# Patient Record
Sex: MALE | Race: WHITE | NOT HISPANIC OR LATINO | URBAN - METROPOLITAN AREA
[De-identification: names, ages, dates, MRNs, and addresses within clinical notes are randomized per-mention and may not be internally consistent; named-entity substitution may affect disease eponyms.]

---

## 2020-03-10 ENCOUNTER — EMERGENCY (EMERGENCY)
Facility: HOSPITAL | Age: 85
LOS: 0 days | Discharge: HOME | End: 2020-03-10
Attending: EMERGENCY MEDICINE
Payer: MEDICARE

## 2020-03-10 VITALS
TEMPERATURE: 98 F | OXYGEN SATURATION: 95 % | HEART RATE: 75 BPM | DIASTOLIC BLOOD PRESSURE: 62 MMHG | SYSTOLIC BLOOD PRESSURE: 125 MMHG | RESPIRATION RATE: 18 BRPM

## 2020-03-10 VITALS
OXYGEN SATURATION: 100 % | SYSTOLIC BLOOD PRESSURE: 99 MMHG | DIASTOLIC BLOOD PRESSURE: 67 MMHG | WEIGHT: 210.98 LBS | HEART RATE: 88 BPM | HEIGHT: 71 IN | TEMPERATURE: 99 F | RESPIRATION RATE: 12 BRPM

## 2020-03-10 DIAGNOSIS — E03.9 HYPOTHYROIDISM, UNSPECIFIED: ICD-10-CM

## 2020-03-10 DIAGNOSIS — R19.7 DIARRHEA, UNSPECIFIED: ICD-10-CM

## 2020-03-10 DIAGNOSIS — Z87.891 PERSONAL HISTORY OF NICOTINE DEPENDENCE: ICD-10-CM

## 2020-03-10 DIAGNOSIS — R10.9 UNSPECIFIED ABDOMINAL PAIN: ICD-10-CM

## 2020-03-10 LAB
ALBUMIN SERPL ELPH-MCNC: 3.2 G/DL — LOW (ref 3.3–5)
ALP SERPL-CCNC: 55 U/L — SIGNIFICANT CHANGE UP (ref 40–120)
ALT FLD-CCNC: 17 U/L — SIGNIFICANT CHANGE UP (ref 12–78)
ANION GAP SERPL CALC-SCNC: 9 MMOL/L — SIGNIFICANT CHANGE UP (ref 5–17)
AST SERPL-CCNC: 22 U/L — SIGNIFICANT CHANGE UP (ref 15–37)
BASOPHILS # BLD AUTO: 0 K/UL — SIGNIFICANT CHANGE UP (ref 0–0.2)
BASOPHILS NFR BLD AUTO: 0 % — SIGNIFICANT CHANGE UP (ref 0–2)
BILIRUB SERPL-MCNC: 0.4 MG/DL — SIGNIFICANT CHANGE UP (ref 0.2–1.2)
BUN SERPL-MCNC: 38 MG/DL — HIGH (ref 7–23)
CALCIUM SERPL-MCNC: 8.6 MG/DL — SIGNIFICANT CHANGE UP (ref 8.5–10.1)
CHLORIDE SERPL-SCNC: 106 MMOL/L — SIGNIFICANT CHANGE UP (ref 96–108)
CO2 SERPL-SCNC: 22 MMOL/L — SIGNIFICANT CHANGE UP (ref 22–31)
CREAT SERPL-MCNC: 2.49 MG/DL — HIGH (ref 0.5–1.3)
EOSINOPHIL # BLD AUTO: 0.24 K/UL — SIGNIFICANT CHANGE UP (ref 0–0.5)
EOSINOPHIL NFR BLD AUTO: 2 % — SIGNIFICANT CHANGE UP (ref 0–6)
GLUCOSE SERPL-MCNC: 134 MG/DL — HIGH (ref 70–99)
HCT VFR BLD CALC: 39.2 % — SIGNIFICANT CHANGE UP (ref 39–50)
HGB BLD-MCNC: 12.9 G/DL — LOW (ref 13–17)
LIDOCAIN IGE QN: 101 U/L — SIGNIFICANT CHANGE UP (ref 73–393)
LYMPHOCYTES # BLD AUTO: 0.36 K/UL — LOW (ref 1–3.3)
LYMPHOCYTES # BLD AUTO: 3 % — LOW (ref 13–44)
MCHC RBC-ENTMCNC: 30.9 PG — SIGNIFICANT CHANGE UP (ref 27–34)
MCHC RBC-ENTMCNC: 32.9 GM/DL — SIGNIFICANT CHANGE UP (ref 32–36)
MCV RBC AUTO: 94 FL — SIGNIFICANT CHANGE UP (ref 80–100)
MONOCYTES # BLD AUTO: 0.36 K/UL — SIGNIFICANT CHANGE UP (ref 0–0.9)
MONOCYTES NFR BLD AUTO: 3 % — SIGNIFICANT CHANGE UP (ref 2–14)
NEUTROPHILS # BLD AUTO: 11.18 K/UL — HIGH (ref 1.8–7.4)
NEUTROPHILS NFR BLD AUTO: 92 % — HIGH (ref 43–77)
NRBC # BLD: SIGNIFICANT CHANGE UP /100 WBCS (ref 0–0)
PLATELET # BLD AUTO: 156 K/UL — SIGNIFICANT CHANGE UP (ref 150–400)
POTASSIUM SERPL-MCNC: 4.5 MMOL/L — SIGNIFICANT CHANGE UP (ref 3.5–5.3)
POTASSIUM SERPL-SCNC: 4.5 MMOL/L — SIGNIFICANT CHANGE UP (ref 3.5–5.3)
PROT SERPL-MCNC: 8 GM/DL — SIGNIFICANT CHANGE UP (ref 6–8.3)
RBC # BLD: 4.17 M/UL — LOW (ref 4.2–5.8)
RBC # FLD: 14.6 % — HIGH (ref 10.3–14.5)
SODIUM SERPL-SCNC: 137 MMOL/L — SIGNIFICANT CHANGE UP (ref 135–145)
WBC # BLD: 12.15 K/UL — HIGH (ref 3.8–10.5)
WBC # FLD AUTO: 12.15 K/UL — HIGH (ref 3.8–10.5)

## 2020-03-10 PROCEDURE — 74176 CT ABD & PELVIS W/O CONTRAST: CPT

## 2020-03-10 PROCEDURE — 85025 COMPLETE CBC W/AUTO DIFF WBC: CPT

## 2020-03-10 PROCEDURE — 74176 CT ABD & PELVIS W/O CONTRAST: CPT | Mod: 26

## 2020-03-10 PROCEDURE — 99284 EMERGENCY DEPT VISIT MOD MDM: CPT | Mod: 25

## 2020-03-10 PROCEDURE — 80053 COMPREHEN METABOLIC PANEL: CPT

## 2020-03-10 PROCEDURE — 83690 ASSAY OF LIPASE: CPT

## 2020-03-10 PROCEDURE — 99284 EMERGENCY DEPT VISIT MOD MDM: CPT

## 2020-03-10 PROCEDURE — 36415 COLL VENOUS BLD VENIPUNCTURE: CPT

## 2020-03-10 RX ORDER — SODIUM CHLORIDE 9 MG/ML
1000 INJECTION INTRAMUSCULAR; INTRAVENOUS; SUBCUTANEOUS ONCE
Refills: 0 | Status: COMPLETED | OUTPATIENT
Start: 2020-03-10 | End: 2020-03-10

## 2020-03-10 RX ADMIN — SODIUM CHLORIDE 1000 MILLILITER(S): 9 INJECTION INTRAMUSCULAR; INTRAVENOUS; SUBCUTANEOUS at 12:12

## 2020-03-10 NOTE — ED ADULT NURSE NOTE - OBJECTIVE STATEMENT
Patient brought in by EMS for nausea, vomiting and diarrhea since last night. Patient and wife moved to facility a few days ago and developed symptoms so facility sent to ER. wife has the same symptoms which started last night. Patient also complaining of being thirsty. patient alert and oriented but forgetful.

## 2020-03-10 NOTE — ED ADULT NURSE NOTE - NSIMPLEMENTINTERV_GEN_ALL_ED
Implemented All Fall with Harm Risk Interventions:  [place] to call system. Call bell, personal items and telephone within reach. Instruct patient to call for assistance. Room bathroom lighting operational. Non-slip footwear when patient is off stretcher. Physically safe environment: no spills, clutter or unnecessary equipment. Stretcher in lowest position, wheels locked, appropriate side rails in place. Provide visual cue, wrist band, yellow gown, etc. Monitor gait and stability. Monitor for mental status changes and reorient to person, place, and time. Review medications for side effects contributing to fall risk. Reinforce activity limits and safety measures with patient and family. Provide visual clues: red socks.

## 2020-03-10 NOTE — ED PROVIDER NOTE - CLINICAL SUMMARY MEDICAL DECISION MAKING FREE TEXT BOX
87M 87M presents to the ED for diarrhea, abdominal discomfort starting yesterday. Multiple episodes, sent from facility to r/o norovirus. Exam with RLQ TTP. Likely viral however concerned for potential diverticulitis. Will obtain labs, stool studies, imaging, reassess.

## 2020-03-10 NOTE — ED PROVIDER NOTE - OBJECTIVE STATEMENT
88 y/o male with PMHx of hypothyroid presents to the ED sent by Rupa CARRILLO regarding diarrhea and abdominal pain since yesterday. Reports he had 10 episodes of watery diarrhea, now improved. +abdominal discomfort. Denies chest pain, SOB, vomiting, fever, chills. Rupa sent pt to the ED to r/o norovirus. No hospitalizations or abx use in the past month. Former smoker, no EtOH use. NKDA. On Synthroid.

## 2020-03-10 NOTE — ED PROVIDER NOTE - NS_ ATTENDINGSCRIBEDETAILS _ED_A_ED_FT
I, Gera Ross MD,  performed the initial face to face bedside interview with this patient regarding history of present illness, review of symptoms and relevant past medical, social and family history.  I completed an independent physical examination.  I was the initial provider who evaluated this patient.  The history, relevant review of systems, past medical and surgical history, medical decision making, and physical examination was documented by the scribe in my presence and I attest to the accuracy of the documentation.

## 2020-03-10 NOTE — ED PROVIDER NOTE - PHYSICAL EXAMINATION
Constitutional: mild distress AAOx3  Eyes: PERRLA EOMI  Head: Normocephalic atraumatic  Mouth: MMM  Cardiac: regular rate   Resp: Lungs CTAB  GI: Abd s/nd +RLQ TTP  Neuro: CN2-12 intact  Skin: No rashes Constitutional: mild distress AAOx3  Eyes: PERRLA EOMI  Head: Normocephalic atraumatic  Mouth: MMM  Cardiac: regular rate   Resp: Lungs CTAB  GI: Abd s/nd +RLQ TTP no rebound or guarding  Neuro: CN2-12 intact  Skin: No rashes Constitutional: NAD AAOx3  Eyes: PERRLA EOMI  Head: Normocephalic atraumatic  Mouth: MMM  Cardiac: regular rate   Resp: Lungs CTAB  GI: Abd s/nd +RLQ TTP no rebound or guarding  Neuro: CN2-12 intact  Skin: No rashes

## 2020-03-10 NOTE — ED ADULT NURSE NOTE - CHPI ED NUR SYMPTOMS NEG
no burning urination/no fever/no hematuria/no abdominal distension/no blood in stool/no chills/no dysuria

## 2020-03-10 NOTE — ED PROVIDER NOTE - NS ED ROS FT
Constitutional: No fever or chills  Eyes: No visual changes  HEENT: No throat pain  CV: No chest pain  Resp: No SOB no cough  GI: No nausea or vomiting +diarrhea +abd pain   : No dysuria  MSK: No musculoskeletal pain  Skin: No rash  Neuro: No headache

## 2020-03-10 NOTE — ED PROVIDER NOTE - PATIENT PORTAL LINK FT
You can access the FollowMyHealth Patient Portal offered by [facility] by registering at the following website: [email or web address]. By joining [employer]’s FollowMyHealth portal, you will also be able to view your health information using other applications (apps) compatible with our system.

## 2020-03-10 NOTE — ED ADULT TRIAGE NOTE - CHIEF COMPLAINT QUOTE
VOMITING AND DIARRHEA FROM THE [facility] HISTORY OF DEMENTIA VOMITING AND DIARRHEA FROM THE [facility]

## 2020-03-10 NOTE — ED PROVIDER NOTE - PROGRESS NOTE DETAILS
ct unremarkable vss. spoke with [facility] and family regarding labs and need for follow up. spoke with [facility] regarding isolation for norovirus/cdiff until test results return. aware. pt well appearing tolerating PO abd soft non-tender. vss. will d/c with follo wup and strict return precautions. Gera Ross M.D., Attending Physician

## 2020-06-22 ENCOUNTER — EMERGENCY (EMERGENCY)
Facility: HOSPITAL | Age: 85
LOS: 0 days | Discharge: HOME | End: 2020-06-23
Attending: EMERGENCY MEDICINE
Payer: MEDICARE

## 2020-06-22 VITALS
HEART RATE: 81 BPM | HEIGHT: 74 IN | DIASTOLIC BLOOD PRESSURE: 75 MMHG | WEIGHT: 220.02 LBS | TEMPERATURE: 98 F | RESPIRATION RATE: 17 BRPM | OXYGEN SATURATION: 99 % | SYSTOLIC BLOOD PRESSURE: 139 MMHG

## 2020-06-22 DIAGNOSIS — Z11.59 ENCOUNTER FOR SCREENING FOR OTHER VIRAL DISEASES: ICD-10-CM

## 2020-06-22 DIAGNOSIS — Z88.0 ALLERGY STATUS TO PENICILLIN: ICD-10-CM

## 2020-06-22 DIAGNOSIS — R41.82 ALTERED MENTAL STATUS, UNSPECIFIED: ICD-10-CM

## 2020-06-22 DIAGNOSIS — M54.5 LOW BACK PAIN: ICD-10-CM

## 2020-06-22 DIAGNOSIS — E03.9 HYPOTHYROIDISM, UNSPECIFIED: ICD-10-CM
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
## 2020-06-22 LAB
ALBUMIN SERPL ELPH-MCNC: 3.3 G/DL — SIGNIFICANT CHANGE UP (ref 3.3–5)
ALP SERPL-CCNC: 108 U/L — SIGNIFICANT CHANGE UP (ref 40–120)
ALT FLD-CCNC: 18 U/L — SIGNIFICANT CHANGE UP (ref 12–78)
ANION GAP SERPL CALC-SCNC: 9 MMOL/L — SIGNIFICANT CHANGE UP (ref 5–17)
APPEARANCE UR: CLEAR — SIGNIFICANT CHANGE UP
AST SERPL-CCNC: 16 U/L — SIGNIFICANT CHANGE UP (ref 15–37)
BASOPHILS # BLD AUTO: 0.02 K/UL — SIGNIFICANT CHANGE UP (ref 0–0.2)
BASOPHILS NFR BLD AUTO: 0.2 % — SIGNIFICANT CHANGE UP (ref 0–2)
BILIRUB SERPL-MCNC: 0.3 MG/DL — SIGNIFICANT CHANGE UP (ref 0.2–1.2)
BILIRUB UR-MCNC: NEGATIVE — SIGNIFICANT CHANGE UP
BUN SERPL-MCNC: 19 MG/DL — SIGNIFICANT CHANGE UP (ref 7–23)
CALCIUM SERPL-MCNC: 8.7 MG/DL — SIGNIFICANT CHANGE UP (ref 8.5–10.1)
CHLORIDE SERPL-SCNC: 106 MMOL/L — SIGNIFICANT CHANGE UP (ref 96–108)
CO2 SERPL-SCNC: 24 MMOL/L — SIGNIFICANT CHANGE UP (ref 22–31)
COLOR SPEC: YELLOW — SIGNIFICANT CHANGE UP
CREAT SERPL-MCNC: 1.46 MG/DL — HIGH (ref 0.5–1.3)
DIFF PNL FLD: NEGATIVE — SIGNIFICANT CHANGE UP
EOSINOPHIL # BLD AUTO: 0.18 K/UL — SIGNIFICANT CHANGE UP (ref 0–0.5)
EOSINOPHIL NFR BLD AUTO: 1.9 % — SIGNIFICANT CHANGE UP (ref 0–6)
GLUCOSE SERPL-MCNC: 91 MG/DL — SIGNIFICANT CHANGE UP (ref 70–99)
GLUCOSE UR QL: NEGATIVE MG/DL — SIGNIFICANT CHANGE UP
HCT VFR BLD CALC: 33.9 % — LOW (ref 39–50)
HGB BLD-MCNC: 11.3 G/DL — LOW (ref 13–17)
IMM GRANULOCYTES NFR BLD AUTO: 0.4 % — SIGNIFICANT CHANGE UP (ref 0–1.5)
KETONES UR-MCNC: NEGATIVE — SIGNIFICANT CHANGE UP
LEUKOCYTE ESTERASE UR-ACNC: NEGATIVE — SIGNIFICANT CHANGE UP
LYMPHOCYTES # BLD AUTO: 1.89 K/UL — SIGNIFICANT CHANGE UP (ref 1–3.3)
LYMPHOCYTES # BLD AUTO: 20.3 % — SIGNIFICANT CHANGE UP (ref 13–44)
MCHC RBC-ENTMCNC: 30.5 PG — SIGNIFICANT CHANGE UP (ref 27–34)
MCHC RBC-ENTMCNC: 33.3 GM/DL — SIGNIFICANT CHANGE UP (ref 32–36)
MCV RBC AUTO: 91.4 FL — SIGNIFICANT CHANGE UP (ref 80–100)
MONOCYTES # BLD AUTO: 1.06 K/UL — HIGH (ref 0–0.9)
MONOCYTES NFR BLD AUTO: 11.4 % — SIGNIFICANT CHANGE UP (ref 2–14)
NEUTROPHILS # BLD AUTO: 6.14 K/UL — SIGNIFICANT CHANGE UP (ref 1.8–7.4)
NEUTROPHILS NFR BLD AUTO: 65.8 % — SIGNIFICANT CHANGE UP (ref 43–77)
NITRITE UR-MCNC: NEGATIVE — SIGNIFICANT CHANGE UP
PH UR: 5 — SIGNIFICANT CHANGE UP (ref 5–8)
PLATELET # BLD AUTO: 193 K/UL — SIGNIFICANT CHANGE UP (ref 150–400)
POTASSIUM SERPL-MCNC: 3.7 MMOL/L — SIGNIFICANT CHANGE UP (ref 3.5–5.3)
POTASSIUM SERPL-SCNC: 3.7 MMOL/L — SIGNIFICANT CHANGE UP (ref 3.5–5.3)
PROT SERPL-MCNC: 7.6 GM/DL — SIGNIFICANT CHANGE UP (ref 6–8.3)
PROT UR-MCNC: NEGATIVE MG/DL — SIGNIFICANT CHANGE UP
RBC # BLD: 3.71 M/UL — LOW (ref 4.2–5.8)
RBC # FLD: 13.9 % — SIGNIFICANT CHANGE UP (ref 10.3–14.5)
SODIUM SERPL-SCNC: 139 MMOL/L — SIGNIFICANT CHANGE UP (ref 135–145)
SP GR SPEC: 1.01 — SIGNIFICANT CHANGE UP (ref 1.01–1.02)
UROBILINOGEN FLD QL: NEGATIVE MG/DL — SIGNIFICANT CHANGE UP
WBC # BLD: 9.33 K/UL — SIGNIFICANT CHANGE UP (ref 3.8–10.5)
WBC # FLD AUTO: 9.33 K/UL — SIGNIFICANT CHANGE UP (ref 3.8–10.5)

## 2020-06-22 PROCEDURE — 80053 COMPREHEN METABOLIC PANEL: CPT

## 2020-06-22 PROCEDURE — 36415 COLL VENOUS BLD VENIPUNCTURE: CPT

## 2020-06-22 PROCEDURE — 71045 X-RAY EXAM CHEST 1 VIEW: CPT | Mod: 26

## 2020-06-22 PROCEDURE — 71045 X-RAY EXAM CHEST 1 VIEW: CPT

## 2020-06-22 PROCEDURE — 99284 EMERGENCY DEPT VISIT MOD MDM: CPT

## 2020-06-22 PROCEDURE — 99284 EMERGENCY DEPT VISIT MOD MDM: CPT | Mod: 25

## 2020-06-22 PROCEDURE — 72131 CT LUMBAR SPINE W/O DYE: CPT | Mod: 26

## 2020-06-22 PROCEDURE — 70450 CT HEAD/BRAIN W/O DYE: CPT | Mod: 26

## 2020-06-22 PROCEDURE — 85025 COMPLETE CBC W/AUTO DIFF WBC: CPT

## 2020-06-22 PROCEDURE — 93005 ELECTROCARDIOGRAM TRACING: CPT

## 2020-06-22 PROCEDURE — 93010 ELECTROCARDIOGRAM REPORT: CPT

## 2020-06-22 PROCEDURE — 87635 SARS-COV-2 COVID-19 AMP PRB: CPT

## 2020-06-22 PROCEDURE — 70450 CT HEAD/BRAIN W/O DYE: CPT

## 2020-06-22 PROCEDURE — 81003 URINALYSIS AUTO W/O SCOPE: CPT

## 2020-06-22 PROCEDURE — 72131 CT LUMBAR SPINE W/O DYE: CPT

## 2020-06-22 RX ORDER — ACETAMINOPHEN 500 MG
1000 TABLET ORAL ONCE
Refills: 0 | Status: COMPLETED | OUTPATIENT
Start: 2020-06-22 | End: 2020-06-22

## 2020-06-22 RX ADMIN — Medication 1000 MILLIGRAM(S): at 22:40

## 2020-06-22 NOTE — ED PROVIDER NOTE - PATIENT PORTAL LINK FT
You can access the FollowMyHealth Patient Portal offered by [facility] by registering at the following website: [email or web address]. By joining [facility]’s FollowMyHealth portal, you will also be able to view your health information using other applications (apps) compatible with our system.

## 2020-06-22 NOTE — ED ADULT TRIAGE NOTE - CHIEF COMPLAINT QUOTE
Pt BIBEMS c/o AMS. Per EMS, AMS started today with increased anxiety. PMHX of anxiety and depression. Pt denies pain, SOB, fever, chills, and states is fearful because he thinks he is going to "get locked up". Per EMS pt tried leaving [facility] facility today, pt reports he wanted to get some fresh air.

## 2020-06-22 NOTE — ED ADULT NURSE NOTE - OBJECTIVE STATEMENT
pt presents to the ED for AMS, per EMS pt was found outside of the [facility], pt states he was going outside for fresh air and was heading back into the [facility] and staffed proceeded to call 911, pt oriented to place, times, and situation but asking repetitive questions, pt reports falling earlier today but denies pain

## 2020-06-22 NOTE — ED PROVIDER NOTE - CARE PLAN
Principal Discharge DX:	Acute low back pain without sciatica, unspecified back pain laterality  Secondary Diagnosis:	Fall, initial encounter

## 2020-06-22 NOTE — ED ADULT NURSE NOTE - NSIMPLEMENTINTERV_GEN_ALL_ED
Implemented All Fall with Harm Risk Interventions:  [place] to call system. Call bell, personal items and telephone within reach. Instruct patient to call for assistance. Room bathroom lighting operational. Non-slip footwear when patient is off stretcher. Physically safe environment: no spills, clutter or unnecessary equipment. Stretcher in lowest position, wheels locked, appropriate side rails in place. Provide visual cue, wrist band, yellow gown, etc. Monitor gait and stability. Monitor for mental status changes and reorient to person, place, and time. Review medications for side effects contributing to fall risk. Reinforce activity limits and safety measures with patient and family. Provide visual clues: red socks.

## 2020-06-22 NOTE — ED PROVIDER NOTE - OBJECTIVE STATEMENT
86 y/o male in ED from the [facility] with AMS tonight.   as per EMS, staff called 911 after pt found outside.   pt states that he went outside for fresh air.   pt denies any fever, HA, neck pain, cp, sob, n/v/d/abd pain.   pt states that he did slip and fall earlier today landing on back c/o lower back pain.   no meds taken for pain.   pt states does not know why staff called 911 because he states that he just stepped outside for fresh air and was heading back into facility when staff found him.

## 2020-06-22 NOTE — ED ADULT NURSE NOTE - CHPI ED NUR SYMPTOMS NEG
no loss of consciousness/no nausea/no numbness/no fever/no blurred vision/no vomiting/no weakness/no dizziness

## 2020-06-23 VITALS
DIASTOLIC BLOOD PRESSURE: 81 MMHG | RESPIRATION RATE: 19 BRPM | TEMPERATURE: 98 F | OXYGEN SATURATION: 100 % | HEART RATE: 79 BPM | SYSTOLIC BLOOD PRESSURE: 144 MMHG

## 2020-06-23 PROBLEM — E03.9 HYPOTHYROIDISM, UNSPECIFIED: Chronic | Status: ACTIVE | Noted: 2020-03-10

## 2020-06-23 LAB — SARS-COV-2 RNA SPEC QL NAA+PROBE: SIGNIFICANT CHANGE UP

## 2020-07-03 ENCOUNTER — INPATIENT (INPATIENT)
Facility: HOSPITAL | Age: 85
LOS: 3 days | Discharge: OTHER INSTITUTION | DRG: 206 | End: 2020-07-07
Attending: HOSPITALIST | Admitting: FAMILY MEDICINE
Payer: MEDICARE

## 2020-07-03 VITALS
OXYGEN SATURATION: 98 % | SYSTOLIC BLOOD PRESSURE: 121 MMHG | TEMPERATURE: 98 F | RESPIRATION RATE: 16 BRPM | DIASTOLIC BLOOD PRESSURE: 83 MMHG | HEART RATE: 75 BPM

## 2020-07-03 DIAGNOSIS — E03.9 HYPOTHYROIDISM, UNSPECIFIED: ICD-10-CM

## 2020-07-03 DIAGNOSIS — D69.3 IMMUNE THROMBOCYTOPENIC PURPURA: ICD-10-CM

## 2020-07-03 DIAGNOSIS — I71.00 DISSECTION OF UNSPECIFIED SITE OF AORTA: ICD-10-CM

## 2020-07-03 DIAGNOSIS — Z86.59 PERSONAL HISTORY OF OTHER MENTAL AND BEHAVIORAL DISORDERS: ICD-10-CM

## 2020-07-03 DIAGNOSIS — I71.00 DISSECTION OF UNSPECIFIED SITE OF AORTA: Chronic | ICD-10-CM

## 2020-07-03 DIAGNOSIS — J18.9 PNEUMONIA, UNSPECIFIED ORGANISM: ICD-10-CM

## 2020-07-03 DIAGNOSIS — R09.02 HYPOXEMIA: ICD-10-CM

## 2020-07-03 DIAGNOSIS — Z90.49 ACQUIRED ABSENCE OF OTHER SPECIFIED PARTS OF DIGESTIVE TRACT: Chronic | ICD-10-CM

## 2020-07-03 LAB
ALBUMIN SERPL ELPH-MCNC: 3.4 G/DL — SIGNIFICANT CHANGE UP (ref 3.3–5.2)
ALP SERPL-CCNC: 111 U/L — SIGNIFICANT CHANGE UP (ref 40–120)
ALT FLD-CCNC: 11 U/L — SIGNIFICANT CHANGE UP
AMPHET UR-MCNC: NEGATIVE — SIGNIFICANT CHANGE UP
ANION GAP SERPL CALC-SCNC: 11 MMOL/L — SIGNIFICANT CHANGE UP (ref 5–17)
APAP SERPL-MCNC: <7.5 UG/ML — LOW (ref 10–26)
APPEARANCE UR: CLEAR — SIGNIFICANT CHANGE UP
APTT BLD: 29.1 SEC — SIGNIFICANT CHANGE UP (ref 27.5–35.5)
AST SERPL-CCNC: 15 U/L — SIGNIFICANT CHANGE UP
BARBITURATES UR SCN-MCNC: NEGATIVE — SIGNIFICANT CHANGE UP
BASE EXCESS BLDA CALC-SCNC: 4.1 MMOL/L — HIGH (ref -2–2)
BASOPHILS # BLD AUTO: 0.03 K/UL — SIGNIFICANT CHANGE UP (ref 0–0.2)
BASOPHILS NFR BLD AUTO: 0.3 % — SIGNIFICANT CHANGE UP (ref 0–2)
BENZODIAZ UR-MCNC: POSITIVE
BILIRUB SERPL-MCNC: 0.3 MG/DL — LOW (ref 0.4–2)
BILIRUB UR-MCNC: NEGATIVE — SIGNIFICANT CHANGE UP
BLOOD GAS COMMENTS ARTERIAL: SIGNIFICANT CHANGE UP
BUN SERPL-MCNC: 16 MG/DL — SIGNIFICANT CHANGE UP (ref 8–20)
CALCIUM SERPL-MCNC: 8.7 MG/DL — SIGNIFICANT CHANGE UP (ref 8.6–10.2)
CHLORIDE SERPL-SCNC: 99 MMOL/L — SIGNIFICANT CHANGE UP (ref 98–107)
CO2 SERPL-SCNC: 26 MMOL/L — SIGNIFICANT CHANGE UP (ref 22–29)
COCAINE METAB.OTHER UR-MCNC: NEGATIVE — SIGNIFICANT CHANGE UP
COLOR SPEC: YELLOW — SIGNIFICANT CHANGE UP
CREAT SERPL-MCNC: 1.34 MG/DL — HIGH (ref 0.5–1.3)
DIFF PNL FLD: NEGATIVE — SIGNIFICANT CHANGE UP
EOSINOPHIL # BLD AUTO: 0.15 K/UL — SIGNIFICANT CHANGE UP (ref 0–0.5)
EOSINOPHIL NFR BLD AUTO: 1.3 % — SIGNIFICANT CHANGE UP (ref 0–6)
ETHANOL SERPL-MCNC: <10 MG/DL — SIGNIFICANT CHANGE UP
GAS PNL BLDA: SIGNIFICANT CHANGE UP
GLUCOSE SERPL-MCNC: 102 MG/DL — HIGH (ref 70–99)
GLUCOSE UR QL: NEGATIVE MG/DL — SIGNIFICANT CHANGE UP
HCO3 BLDA-SCNC: 28 MMOL/L — HIGH (ref 20–26)
HCT VFR BLD CALC: 34 % — LOW (ref 39–50)
HGB BLD-MCNC: 11.3 G/DL — LOW (ref 13–17)
HOROWITZ INDEX BLDA+IHG-RTO: 0.28 — SIGNIFICANT CHANGE UP
IMM GRANULOCYTES NFR BLD AUTO: 0.3 % — SIGNIFICANT CHANGE UP (ref 0–1.5)
INR BLD: 1.1 RATIO — SIGNIFICANT CHANGE UP (ref 0.88–1.16)
KETONES UR-MCNC: NEGATIVE — SIGNIFICANT CHANGE UP
LEUKOCYTE ESTERASE UR-ACNC: NEGATIVE — SIGNIFICANT CHANGE UP
LYMPHOCYTES # BLD AUTO: 1.36 K/UL — SIGNIFICANT CHANGE UP (ref 1–3.3)
LYMPHOCYTES # BLD AUTO: 12.1 % — LOW (ref 13–44)
MAGNESIUM SERPL-MCNC: 1.7 MG/DL — SIGNIFICANT CHANGE UP (ref 1.6–2.6)
MCHC RBC-ENTMCNC: 30.5 PG — SIGNIFICANT CHANGE UP (ref 27–34)
MCHC RBC-ENTMCNC: 33.2 GM/DL — SIGNIFICANT CHANGE UP (ref 32–36)
MCV RBC AUTO: 91.9 FL — SIGNIFICANT CHANGE UP (ref 80–100)
METHADONE UR-MCNC: NEGATIVE — SIGNIFICANT CHANGE UP
MONOCYTES # BLD AUTO: 1.03 K/UL — HIGH (ref 0–0.9)
MONOCYTES NFR BLD AUTO: 9.2 % — SIGNIFICANT CHANGE UP (ref 2–14)
NEUTROPHILS # BLD AUTO: 8.64 K/UL — HIGH (ref 1.8–7.4)
NEUTROPHILS NFR BLD AUTO: 76.8 % — SIGNIFICANT CHANGE UP (ref 43–77)
NITRITE UR-MCNC: NEGATIVE — SIGNIFICANT CHANGE UP
OPIATES UR-MCNC: NEGATIVE — SIGNIFICANT CHANGE UP
PCO2 BLDA: 37 MMHG — SIGNIFICANT CHANGE UP (ref 35–45)
PCP SPEC-MCNC: SIGNIFICANT CHANGE UP
PCP UR-MCNC: NEGATIVE — SIGNIFICANT CHANGE UP
PH BLDA: 7.48 — HIGH (ref 7.35–7.45)
PH UR: 5 — SIGNIFICANT CHANGE UP (ref 5–8)
PHOSPHATE SERPL-MCNC: 3.3 MG/DL — SIGNIFICANT CHANGE UP (ref 2.4–4.7)
PLATELET # BLD AUTO: 180 K/UL — SIGNIFICANT CHANGE UP (ref 150–400)
PO2 BLDA: 116 MMHG — HIGH (ref 83–108)
POTASSIUM SERPL-MCNC: 3.8 MMOL/L — SIGNIFICANT CHANGE UP (ref 3.5–5.3)
POTASSIUM SERPL-SCNC: 3.8 MMOL/L — SIGNIFICANT CHANGE UP (ref 3.5–5.3)
PROT SERPL-MCNC: 6.9 G/DL — SIGNIFICANT CHANGE UP (ref 6.6–8.7)
PROT UR-MCNC: 15 MG/DL
PROTHROM AB SERPL-ACNC: 12.7 SEC — SIGNIFICANT CHANGE UP (ref 10.6–13.6)
RBC # BLD: 3.7 M/UL — LOW (ref 4.2–5.8)
RBC # FLD: 14 % — SIGNIFICANT CHANGE UP (ref 10.3–14.5)
SALICYLATES SERPL-MCNC: <0.6 MG/DL — LOW (ref 10–20)
SAO2 % BLDA: 99 % — SIGNIFICANT CHANGE UP (ref 95–99)
SARS-COV-2 RNA SPEC QL NAA+PROBE: SIGNIFICANT CHANGE UP
SODIUM SERPL-SCNC: 136 MMOL/L — SIGNIFICANT CHANGE UP (ref 135–145)
SP GR SPEC: 1.02 — SIGNIFICANT CHANGE UP (ref 1.01–1.02)
T4 AB SER-ACNC: 7 UG/DL — SIGNIFICANT CHANGE UP (ref 4.5–12)
THC UR QL: NEGATIVE — SIGNIFICANT CHANGE UP
TSH SERPL-MCNC: 0.19 UIU/ML — LOW (ref 0.27–4.2)
UROBILINOGEN FLD QL: NEGATIVE MG/DL — SIGNIFICANT CHANGE UP
WBC # BLD: 11.24 K/UL — HIGH (ref 3.8–10.5)
WBC # FLD AUTO: 11.24 K/UL — HIGH (ref 3.8–10.5)

## 2020-07-03 PROCEDURE — 99284 EMERGENCY DEPT VISIT MOD MDM: CPT | Mod: CS

## 2020-07-03 PROCEDURE — 71275 CT ANGIOGRAPHY CHEST: CPT | Mod: 26

## 2020-07-03 PROCEDURE — 70450 CT HEAD/BRAIN W/O DYE: CPT | Mod: 26

## 2020-07-03 PROCEDURE — 99223 1ST HOSP IP/OBS HIGH 75: CPT

## 2020-07-03 PROCEDURE — 71045 X-RAY EXAM CHEST 1 VIEW: CPT | Mod: 26

## 2020-07-03 RX ORDER — SODIUM CHLORIDE 9 MG/ML
500 INJECTION INTRAMUSCULAR; INTRAVENOUS; SUBCUTANEOUS
Refills: 0 | Status: COMPLETED | OUTPATIENT
Start: 2020-07-03 | End: 2020-07-03

## 2020-07-03 RX ORDER — ESCITALOPRAM OXALATE 10 MG/1
15 TABLET, FILM COATED ORAL DAILY
Refills: 0 | Status: DISCONTINUED | OUTPATIENT
Start: 2020-07-03 | End: 2020-07-07

## 2020-07-03 RX ORDER — LANOLIN ALCOHOL/MO/W.PET/CERES
3 CREAM (GRAM) TOPICAL AT BEDTIME
Refills: 0 | Status: DISCONTINUED | OUTPATIENT
Start: 2020-07-03 | End: 2020-07-07

## 2020-07-03 RX ORDER — LEVOTHYROXINE SODIUM 125 MCG
137 TABLET ORAL DAILY
Refills: 0 | Status: DISCONTINUED | OUTPATIENT
Start: 2020-07-03 | End: 2020-07-07

## 2020-07-03 RX ORDER — MAGNESIUM SULFATE 500 MG/ML
1 VIAL (ML) INJECTION ONCE
Refills: 0 | Status: COMPLETED | OUTPATIENT
Start: 2020-07-03 | End: 2020-07-03

## 2020-07-03 RX ORDER — METOPROLOL TARTRATE 50 MG
25 TABLET ORAL
Refills: 0 | Status: DISCONTINUED | OUTPATIENT
Start: 2020-07-03 | End: 2020-07-07

## 2020-07-03 RX ORDER — IPRATROPIUM/ALBUTEROL SULFATE 18-103MCG
3 AEROSOL WITH ADAPTER (GRAM) INHALATION EVERY 6 HOURS
Refills: 0 | Status: DISCONTINUED | OUTPATIENT
Start: 2020-07-03 | End: 2020-07-06

## 2020-07-03 RX ORDER — QUETIAPINE FUMARATE 200 MG/1
25 TABLET, FILM COATED ORAL
Refills: 0 | Status: DISCONTINUED | OUTPATIENT
Start: 2020-07-03 | End: 2020-07-07

## 2020-07-03 RX ORDER — LACTOBACILLUS ACIDOPHILUS 100MM CELL
1 CAPSULE ORAL
Refills: 0 | Status: DISCONTINUED | OUTPATIENT
Start: 2020-07-03 | End: 2020-07-07

## 2020-07-03 RX ORDER — RISPERIDONE 4 MG/1
0.25 TABLET ORAL AT BEDTIME
Refills: 0 | Status: DISCONTINUED | OUTPATIENT
Start: 2020-07-03 | End: 2020-07-07

## 2020-07-03 RX ORDER — ALPRAZOLAM 0.25 MG
0.25 TABLET ORAL
Refills: 0 | Status: DISCONTINUED | OUTPATIENT
Start: 2020-07-03 | End: 2020-07-04

## 2020-07-03 RX ORDER — FUROSEMIDE 40 MG
20 TABLET ORAL DAILY
Refills: 0 | Status: DISCONTINUED | OUTPATIENT
Start: 2020-07-04 | End: 2020-07-04

## 2020-07-03 RX ADMIN — Medication 100 GRAM(S): at 22:42

## 2020-07-03 RX ADMIN — SODIUM CHLORIDE 100 MILLILITER(S): 9 INJECTION INTRAMUSCULAR; INTRAVENOUS; SUBCUTANEOUS at 20:26

## 2020-07-03 RX ADMIN — RISPERIDONE 0.25 MILLIGRAM(S): 4 TABLET ORAL at 22:42

## 2020-07-03 RX ADMIN — QUETIAPINE FUMARATE 25 MILLIGRAM(S): 200 TABLET, FILM COATED ORAL at 22:42

## 2020-07-03 NOTE — ED PROVIDER NOTE - OBJECTIVE STATEMENT
As per nurse from [facility], patient noted to become progressively weaker since this morning; POx=70s% in RA noted, EMS=80s%; currently patient is not cooperative with history and states he feels very depressed because his "house in [place] got sold."    PMD: Robin Rahman ([phone])

## 2020-07-03 NOTE — ED ADULT TRIAGE NOTE - CHIEF COMPLAINT QUOTE
Pt with hx of depression arrives from [facility] after just moving from [facility] in [place]. Pt with flat affect and teary eyed states he is sad. Pt denies any medical complaints but EMS reports pt had low 02 sat in the mid 80's up to 94% with 02.

## 2020-07-03 NOTE — H&P ADULT - ATTENDING COMMENTS
pt's ct angio chest : no gross evidence of PE. The patient is status post repair of type B aortic dissection. The distal descending thoracic aorta at the hiatus measures up to 6.5 cm in transaxial dimension. Limited assessment of the thoracic aorta due to suboptimal opacification. Ct surgery consult requested, spoke to on call Jason bhatt 10:35 pm 7/3/20. pt's ct angio chest : no gross evidence of PE. The patient is status post repair of type B aortic dissection. The distal descending thoracic aorta at the hiatus measures up to 6.5 cm in transaxial dimension. Limited assessment of the thoracic aorta due to suboptimal opacification. Ct surgery consult requested, spoke to on call pA. hold chemical dvt prophylaxis for now till seen by attending in am as per ct surgery PA. dr. bhatt 10:35 pm 7/3/20. pt's ct angio chest : no gross evidence of PE. The patient is status post repair of type B aortic dissection. The distal descending thoracic aorta at the hiatus measures up to 6.5 cm in transaxial dimension. Limited assessment of the thoracic aorta due to suboptimal opacification. Ct surgery consult requested, spoke to on call pA. hold chemical dvt prophylaxis for now till seen by attending in am as per ct surgery PA. pt. is hemodynamically stable. dr. bhatt 10:35 pm 7/3/20.

## 2020-07-03 NOTE — H&P ADULT - NSICDXPASTMEDICALHX_GEN_ALL_CORE_FT
PAST MEDICAL HISTORY:  Anxiety disorder     Asthma     Hypothyroidism     Idiopathic thrombocytopenia     Major depression

## 2020-07-03 NOTE — ED PROVIDER NOTE - CLINICAL SUMMARY MEDICAL DECISION MAKING FREE TEXT BOX
labs and diagnostic imaging results reviewed with patient; abx started; considering hypoxia, will admit for IV abx and monitoring; psychiatry consulted for depression

## 2020-07-03 NOTE — CHART NOTE - NSCHARTNOTEFT_GEN_A_CORE
pt. noted to have difficulty understanding iv contrast for the ct scan to r/o PE, will proceed with the test as medically needed to r/o PE. dr. bhatt

## 2020-07-03 NOTE — ED PROVIDER NOTE - ENMT, MLM
Airway patent, Nasal mucosa clear. Oral mucosa dry. Throat has no vesicles, no oropharyngeal exudates and uvula is midline.

## 2020-07-03 NOTE — H&P ADULT - PROBLEM SELECTOR PLAN 1
Hypoxemia as per history , in the ER tolerating o2 via NC 2 L and o2 sat 98 %. asthma, small infiltrate on left related ? will r/o PE. will keep on duoneb, o2 support. clinically not in acute asthma exacerbation.

## 2020-07-03 NOTE — H&P ADULT - PROBLEM SELECTOR PLAN 3
will continue home meds, pt. reports no si/hi. ER physician requested psych. consult, will follow psychiatrist recommendations.

## 2020-07-03 NOTE — H&P ADULT - NSHPPHYSICALEXAM_GEN_ALL_CORE
General: An elderly  male lying in bed not in distress.   HEENT: AT, NC. PERRL. intact EOM. no throat erythema or exudate.   Neck: supple. no JVD.   Chest: CTA bilaterally, no wheeze or rales appreciated.   Heart: S1,S2. RRR. no heart murmur. no edema.   Abdomen: soft. non-tender in all abd. quadrants, obese, + BS.   Ext: no calf tenderness. moves all ext. without restriction. distal pulses intact.   Neuro: AAO x3. no focal weakness. no speech disorder. follows commands, CNs intact.   Skin: no rash noted, normal tone, no pallor.  Psych : mood appears ok, answering questions appropriately, no agitation, co-operative, willing to talk to a psychiatrist , reports no si/hi. General: An elderly  male lying in bed not in distress.   HEENT: AT, NC. PERRL. intact EOM. no throat erythema or exudate.   Neck: supple. no JVD.   Chest: CTA bilaterally, no wheeze or rales appreciated.   Heart: S1,S2. RRR. no heart murmur. no edema.   Abdomen: soft. non-tender in all abd. quadrants, obese, + BS.   Ext: no calf tenderness. moves all ext. without restriction. upper /lower distal pulses intact.   Neuro: AAO x3. no focal weakness. no speech disorder. follows commands, CNs intact.   Skin: no rash noted, normal tone, no pallor.  Psych : mood appears ok, answering questions appropriately, no agitation, co-operative, willing to talk to a psychiatrist , reports no si/hi.

## 2020-07-03 NOTE — ED ADULT NURSE NOTE - CHIEF COMPLAINT QUOTE
Pt with hx of depression arrives from [facility] after just moving from [facility] in [place]. Pt with flat affect and teary eyed states he is sad. Pt denies any medical complaints but EMS reports pt had low 02 sat in the mid 80's up to 94% with 02.

## 2020-07-03 NOTE — H&P ADULT - NSICDXPASTSURGICALHX_GEN_ALL_CORE_FT
PAST SURGICAL HISTORY:  Aortic dissection repair PAST SURGICAL HISTORY:  Aortic dissection repair    S/P cholecystectomy

## 2020-07-03 NOTE — ED PROVIDER NOTE - CADM POA PRESS ULCER
EMERGENCY DEPARTMENT HISTORY AND PHYSICAL EXAM    4:05 PM      Date: 7/11/2018  Patient Name: Keon Mathew    History of Presenting Illness     Chief Complaint   Patient presents with    Altered mental status     seizure history       History Provided By: EMS    Chief Complaint: altered mental status  Duration:  Minutes  Timing:  Acute  Location: generalized  Quality: N/A  Severity: Moderate  Modifying Factors: noxious stimuli arouse patient   Associated Symptoms: denies any other associated signs or symptoms      Additional History (Context): Keon Mathew is a 50 y.o. male with hypothyriodism, seizures, and cerebral palsy who presents with altered mental status noticed approximately 20 minutes prior to arrival to the ED by others in his group home. Per EMS, the patient was found to be less responsive than usual (appears to be nonverbal at baseline) in the manner of not responding to verbal stimuli. EMS found him with normal vitals, per the group home he had all of his medications per usual today, no other complaints or abnormalities noted. PCP: Tatyana Raphael MD    Current Outpatient Prescriptions   Medication Sig Dispense Refill    zonisamide (ZONEGRAN) 100 mg capsule Take  by mouth daily. 3 tabs daily through 11/30 then 4 tabs daily      DIVALPROEX SODIUM (DEPAKOTE PO) Take 1,000 mg PE by mouth three (3) times daily. 8am, 2pm, and 8pm      phenytoin ER (DILANTIN) 30 mg ER capsule Take 130 mg by mouth daily.  levothyroxine (LEVOXYL) 75 mcg tablet Take 75 mcg by mouth Daily (before breakfast).  Calcium-Cholecalciferol, D3, 500 mg(1,250mg) -400 unit tab Take 1 Tab by mouth daily.  multivitamin (ONE A DAY) tablet Take 1 Tab by mouth daily.  polyethylene glycol (MIRALAX) 17 gram packet Take 17 g by mouth every Monday, Wednesday, Friday.  docusate sodium (COLACE) 100 mg capsule Take 100 mg by mouth two (2) times a day.       clotrimazole-betamethasone (LOTRISONE) topical cream Apply  to affected area two (2) times a day. Apply between toes      chlorhexidine (PERIDEX) 0.12 % solution 15 mL by Swish and Spit route two (2) times a day.  tolnaftate (TINACTIN) 1 % topical powder Apply  to affected area two (2) times a day. Past History     Past Medical History:  Past Medical History:   Diagnosis Date    Endocrine disease     hypothyroidism    Neurological disorder     cerebral palsy    Seizures ([address]        Past Surgical History:  Past Surgical History:   Procedure Laterality Date    HX ORTHOPAEDIC      right heel        Family History:  History reviewed. No pertinent family history. Social History:  Social History   Substance Use Topics    Smoking status: Never Smoker    Smokeless tobacco: Never Used    Alcohol use No       Allergies: Allergies   Allergen Reactions    Amoxicillin Swelling    Cephalosporins Unable to Obtain    Penicillin G Swelling         Review of Systems       Review of Systems   Unable to perform ROS: Patient nonverbal         Physical Exam     Visit Vitals    /73    Pulse 61    Temp 97.6 °F (36.4 °C)    Resp 17    Ht 5' 6\" (1.676 m)    Wt 83.9 kg (185 lb)    SpO2 97%    BMI 29.86 kg/m2         Physical Exam   Constitutional: No distress. HENT:   Head: Atraumatic. Mouth/Throat: Oropharynx is clear and moist. No oropharyngeal exudate. Eyes: Pupils are equal, round, and reactive to light. Right eye exhibits no discharge. Left eye exhibits no discharge. Neck: Normal range of motion. No JVD present. No tracheal deviation present. Cardiovascular: Normal rate, regular rhythm, normal heart sounds and intact distal pulses. Exam reveals no gallop. No murmur heard. Pulmonary/Chest: Effort normal and breath sounds normal. No stridor. No respiratory distress. He has no wheezes. Abdominal: Soft. Bowel sounds are normal. He exhibits no distension. There is no guarding. Musculoskeletal: Normal range of motion.  He exhibits no edema. Neurological:   A&Oxo, responsove to noxious stimuli only, moves all four extremities purposefully    Skin: Skin is warm and dry. No rash noted. He is not diaphoretic. No erythema. No pallor. Diagnostic Study Results     Labs -  Recent Results (from the past 12 hour(s))   EKG, 12 LEAD, INITIAL    Collection Time: 07/11/18  4:22 PM   Result Value Ref Range    Ventricular Rate 66 BPM    Atrial Rate 66 BPM    P-R Interval 158 ms    QRS Duration 90 ms    Q-T Interval 356 ms    QTC Calculation (Bezet) 373 ms    Calculated P Axis 61 degrees    Calculated R Axis 34 degrees    Calculated T Axis 24 degrees    Diagnosis       Normal sinus rhythm  Nonspecific ST and T wave abnormality  Abnormal ECG  When compared with ECG of 06-MAR-2014 21:19,  No significant change was found  Confirmed by Amy Xiao ([id number]) on 7/11/2018 5:23:54 PM     GLUCOSE, POC    Collection Time: 07/11/18  4:35 PM   Result Value Ref Range    Glucose (POC) 106 70 - 110 mg/dL   CBC WITH AUTOMATED DIFF    Collection Time: 07/11/18  5:32 PM   Result Value Ref Range    WBC 4.2 (L) 4.6 - 13.2 K/uL    RBC 4.88 4.70 - 5.50 M/uL    HGB 14.4 13.0 - 16.0 g/dL    HCT 42.8 36.0 - 48.0 %    MCV 87.7 74.0 - 97.0 FL    MCH 29.5 24.0 - 34.0 PG    MCHC 33.6 31.0 - 37.0 g/dL    RDW 13.8 11.6 - 14.5 %    PLATELET 912 (L) 571 - 420 K/uL    MPV 12.2 (H) 9.2 - 11.8 FL    NEUTROPHILS 31 (L) 40 - 73 %    LYMPHOCYTES 59 (H) 21 - 52 %    MONOCYTES 9 3 - 10 %    EOSINOPHILS 1 0 - 5 %    BASOPHILS 0 0 - 2 %    ABS. NEUTROPHILS 1.3 (L) 1.8 - 8.0 K/UL    ABS. LYMPHOCYTES 2.5 0.9 - 3.6 K/UL    ABS. MONOCYTES 0.4 0.05 - 1.2 K/UL    ABS. EOSINOPHILS 0.1 0.0 - 0.4 K/UL    ABS.  BASOPHILS 0.0 0.0 - 0.1 K/UL    DF AUTOMATED     METABOLIC PANEL, BASIC    Collection Time: 07/11/18  5:32 PM   Result Value Ref Range    Sodium 145 136 - 145 mmol/L    Potassium 4.1 3.5 - 5.5 mmol/L    Chloride 111 (H) 100 - 108 mmol/L    CO2 28 21 - 32 mmol/L    Anion gap 6 3.0 - 18 mmol/L    Glucose 103 (H) 74 - 99 mg/dL    BUN 18 7.0 - 18 MG/DL    Creatinine 0.90 0.6 - 1.3 MG/DL    BUN/Creatinine ratio 20 12 - 20      GFR est AA >60 >60 ml/min/1.73m2    GFR est non-AA >60 >60 ml/min/1.73m2    Calcium 8.6 8.5 - 10.1 MG/DL   TSH 3RD GENERATION    Collection Time: 07/11/18  5:32 PM   Result Value Ref Range    TSH 0.29 (L) 0.36 - 3.74 uIU/mL   T4, FREE    Collection Time: 07/11/18  5:32 PM   Result Value Ref Range    T4, Free 0.6 (L) 0.7 - 1.5 NG/DL   PHENYTOIN    Collection Time: 07/11/18  5:32 PM   Result Value Ref Range    Phenytoin 0.9 (L) 10.0 - 20.0 ug/mL   VALPROIC ACID    Collection Time: 07/11/18  5:32 PM   Result Value Ref Range    Valproic acid 84 50 - 100 ug/ml   URINALYSIS W/ RFLX MICROSCOPIC    Collection Time: 07/11/18  6:19 PM   Result Value Ref Range    Color YELLOW      Appearance CLEAR      Specific gravity >1.030 (H) 1.005 - 1.030    pH (UA) 7.0 5.0 - 8.0      Protein NEGATIVE  NEG mg/dL    Glucose NEGATIVE  NEG mg/dL    Ketone TRACE (A) NEG mg/dL    Bilirubin NEGATIVE  NEG      Blood NEGATIVE  NEG      Urobilinogen 1.0 0.2 - 1.0 EU/dL    Nitrites NEGATIVE  NEG      Leukocyte Esterase NEGATIVE  NEG         Radiologic Studies -   CT HEAD WO CONT   Final Result            Medical Decision Making   I am the first provider for this patient. I reviewed the vital signs, available nursing notes, past medical history, past surgical history, family history and social history. Vital Signs-Reviewed the patient's vital signs. Pulse Oximetry Analysis -  97 on room air (Interpretation) Normal     Cardiac Monitor:  Rate: 61  Rhythm:  Normal Sinus Rhythm     EKG: Interpreted by the EP.    Time Interpreted: 5:24pm   Rate: 66   Rhythm: sinus    Interpretation: Normal sinus rhythm, Nonspecific ST and T wave abnormality seen on prior ECG, no sig change   Comparison: March 2014    Records Reviewed: Nursing Notes, Old Medical Records, Previous electrocardiograms, Previous Radiology Studies and Previous Laboratory Studies (Time of Review: 4:05 PM)    ED Course: Progress Notes, Reevaluation, and Consults:    Provider Notes (Medical Decision Making): 46yo M with AMS found in group home earlier today. Exam with pinpoint pupils, bradypnea, empiric naloxone attempted. Given unable to attain history per the status of the patient, will initiate broad workup for evaluation of infectious etiology, seizure, polypharmacy, thyroid abnormalities, and other etiologies. Labs notable for subtherapeutic dilantin levels; loaded while in ED. Also notable for hypothyroid; per center personelle at bedside, has been getting his home meds. Has an appointment tomorrow to discuss rising the level of levothyroxine. Normal CT head. NO signs infectious etiology. Otherwise the patient is now back to baseline. Suspect this was a post-ictal state from seizure. Labs otherwise unremarkable, patient already has PCP follow up, center feels comfortable taking patient back home for further care. Diagnosis     Clinical Impression:   1. Seizure disorder ([address]    2. Hypothyroidism, unspecified type        Disposition: Discharge     Follow-up Information     Follow up With Details Comments Shorty Owen MD Call today To set up an appointment to discuss dilantin levels as well as thyriod levels [place] [id number]  [phone]             Discharge Medication List as of 7/11/2018  6:55 PM      CONTINUE these medications which have NOT CHANGED    Details   zonisamide (ZONEGRAN) 100 mg capsule Take  by mouth daily. 3 tabs daily through 11/30 then 4 tabs daily, Historical Med      DIVALPROEX SODIUM (DEPAKOTE PO) Take 1,000 mg PE by mouth three (3) times daily. 8am, 2pm, and 8pm, Historical Med      phenytoin ER (DILANTIN) 30 mg ER capsule Take 130 mg by mouth daily. , Historical Med      levothyroxine (LEVOXYL) 75 mcg tablet Take 75 mcg by mouth Daily (before breakfast). , Historical Med      Calcium-Cholecalciferol, D3, 500 mg(1,250mg) -400 unit tab Take 1 Tab by mouth daily. , Historical Med      multivitamin (ONE A DAY) tablet Take 1 Tab by mouth daily. , Historical Med      polyethylene glycol (MIRALAX) 17 gram packet Take 17 g by mouth every Monday, Wednesday, Friday., Historical Med      docusate sodium (COLACE) 100 mg capsule Take 100 mg by mouth two (2) times a day., Historical Med      clotrimazole-betamethasone (LOTRISONE) topical cream Apply  to affected area two (2) times a day.  Apply between toes, Historical Med      chlorhexidine (PERIDEX) 0.12 % solution 15 mL by Swish and Spit route two (2) times a day., Historical Med      tolnaftate (TINACTIN) 1 % topical powder Apply  to affected area two (2) times a day., Historical Med           _______________________________ No

## 2020-07-03 NOTE — H&P ADULT - HISTORY OF PRESENT ILLNESS
85 y/o male was brought in by ems from [facility] living in [facility] for generalized weakness and fatigue, o2 sat was reported 70s% RA and EMS=80s%; pt. has h/o asthma.  As per ER physician on presentation pt. was not cooperative with history and stated he feels depressed because his "house in [place] got sold." pt. has h/o depression, anxiety. At the time of admission pt. is very co-operative , giving good history but not sure why he was sent to the hospital and wants to go back. Pt. stated that he sometimes feels depressed but reports no si/hi. pt. is aaox3. pt. reports no cp, no cough, no fever, no sob, resting in the bed without any distress. 85 y/o male was brought in by ems from [facility] in [facility] for generalized weakness and fatigue, o2 sat was reported 70s% RA and EMS=80s%; pt. has h/o asthma.  As per ER physician on presentation pt. was not cooperative with history and stated he feels depressed because his "house in [place] got sold." pt. has h/o depression, anxiety. At the time of admission pt. is very co-operative , giving good history but not sure why he was sent to the hospital and wants to go back. Pt. stated that he sometimes feels depressed but reports no si/hi. pt. is aaox3. pt. reports no cp, no cough, no fever, no sob, resting in the bed without any distress. pt's o2 sat in the er with 2 L NC is 98 %. 85 y/o male was brought in by ems from [facility] in [facility] for generalized weakness and fatigue, o2 sat was reported 70s% RA and EMS=80s%; pt. has h/o asthma.  As per ER physician on presentation pt. was not cooperative with history and stated he feels depressed because his "house in [place] got sold." pt. has h/o depression, anxiety. At the time of admission pt. is very co-operative , giving good history but not sure why he was sent to the hospital and wants to go back. Pt. stated that he sometimes feels depressed but reports no si/hi. pt. is aaox3. pt. reports no cp, no cough, no fever, no sob, no abd. pain. no n/v/d. resting in the bed without any distress. pt's o2 sat in the er with 2 L NC is 98 %.

## 2020-07-04 DIAGNOSIS — Z86.79 PERSONAL HISTORY OF OTHER DISEASES OF THE CIRCULATORY SYSTEM: ICD-10-CM

## 2020-07-04 LAB
ANION GAP SERPL CALC-SCNC: 14 MMOL/L — SIGNIFICANT CHANGE UP (ref 5–17)
BASOPHILS # BLD AUTO: 0.03 K/UL — SIGNIFICANT CHANGE UP (ref 0–0.2)
BASOPHILS NFR BLD AUTO: 0.3 % — SIGNIFICANT CHANGE UP (ref 0–2)
BUN SERPL-MCNC: 15 MG/DL — SIGNIFICANT CHANGE UP (ref 8–20)
CALCIUM SERPL-MCNC: 8.9 MG/DL — SIGNIFICANT CHANGE UP (ref 8.6–10.2)
CHLORIDE SERPL-SCNC: 98 MMOL/L — SIGNIFICANT CHANGE UP (ref 98–107)
CO2 SERPL-SCNC: 24 MMOL/L — SIGNIFICANT CHANGE UP (ref 22–29)
CREAT SERPL-MCNC: 1.23 MG/DL — SIGNIFICANT CHANGE UP (ref 0.5–1.3)
CULTURE RESULTS: SIGNIFICANT CHANGE UP
EOSINOPHIL # BLD AUTO: 0.18 K/UL — SIGNIFICANT CHANGE UP (ref 0–0.5)
EOSINOPHIL NFR BLD AUTO: 1.6 % — SIGNIFICANT CHANGE UP (ref 0–6)
GLUCOSE SERPL-MCNC: 116 MG/DL — HIGH (ref 70–99)
HCT VFR BLD CALC: 35.9 % — LOW (ref 39–50)
HGB BLD-MCNC: 11.9 G/DL — LOW (ref 13–17)
IMM GRANULOCYTES NFR BLD AUTO: 0.4 % — SIGNIFICANT CHANGE UP (ref 0–1.5)
LYMPHOCYTES # BLD AUTO: 0.77 K/UL — LOW (ref 1–3.3)
LYMPHOCYTES # BLD AUTO: 6.9 % — LOW (ref 13–44)
MAGNESIUM SERPL-MCNC: 1.8 MG/DL — SIGNIFICANT CHANGE UP (ref 1.6–2.6)
MCHC RBC-ENTMCNC: 30.6 PG — SIGNIFICANT CHANGE UP (ref 27–34)
MCHC RBC-ENTMCNC: 33.1 GM/DL — SIGNIFICANT CHANGE UP (ref 32–36)
MCV RBC AUTO: 92.3 FL — SIGNIFICANT CHANGE UP (ref 80–100)
MONOCYTES # BLD AUTO: 0.71 K/UL — SIGNIFICANT CHANGE UP (ref 0–0.9)
MONOCYTES NFR BLD AUTO: 6.3 % — SIGNIFICANT CHANGE UP (ref 2–14)
NEUTROPHILS # BLD AUTO: 9.48 K/UL — HIGH (ref 1.8–7.4)
NEUTROPHILS NFR BLD AUTO: 84.5 % — HIGH (ref 43–77)
PLATELET # BLD AUTO: 166 K/UL — SIGNIFICANT CHANGE UP (ref 150–400)
POTASSIUM SERPL-MCNC: 3.3 MMOL/L — LOW (ref 3.5–5.3)
POTASSIUM SERPL-SCNC: 3.3 MMOL/L — LOW (ref 3.5–5.3)
RBC # BLD: 3.89 M/UL — LOW (ref 4.2–5.8)
RBC # FLD: 13.9 % — SIGNIFICANT CHANGE UP (ref 10.3–14.5)
SARS-COV-2 IGG SERPL QL IA: NEGATIVE — SIGNIFICANT CHANGE UP
SARS-COV-2 IGM SERPL IA-ACNC: 0.08 INDEX — SIGNIFICANT CHANGE UP
SODIUM SERPL-SCNC: 136 MMOL/L — SIGNIFICANT CHANGE UP (ref 135–145)
SPECIMEN SOURCE: SIGNIFICANT CHANGE UP
T3 SERPL-MCNC: 79 NG/DL — LOW (ref 80–200)
T4 AB SER-ACNC: 8 UG/DL — SIGNIFICANT CHANGE UP (ref 4.5–12)
TSH SERPL-MCNC: 0.32 UIU/ML — SIGNIFICANT CHANGE UP (ref 0.27–4.2)
WBC # BLD: 11.21 K/UL — HIGH (ref 3.8–10.5)
WBC # FLD AUTO: 11.21 K/UL — HIGH (ref 3.8–10.5)

## 2020-07-04 PROCEDURE — 99222 1ST HOSP IP/OBS MODERATE 55: CPT

## 2020-07-04 PROCEDURE — 99233 SBSQ HOSP IP/OBS HIGH 50: CPT

## 2020-07-04 PROCEDURE — 93010 ELECTROCARDIOGRAM REPORT: CPT

## 2020-07-04 RX ORDER — HEPARIN SODIUM 5000 [USP'U]/ML
5000 INJECTION INTRAVENOUS; SUBCUTANEOUS EVERY 12 HOURS
Refills: 0 | Status: DISCONTINUED | OUTPATIENT
Start: 2020-07-04 | End: 2020-07-07

## 2020-07-04 RX ORDER — ALPRAZOLAM 0.25 MG
0.25 TABLET ORAL
Refills: 0 | Status: DISCONTINUED | OUTPATIENT
Start: 2020-07-04 | End: 2020-07-07

## 2020-07-04 RX ORDER — ASPIRIN/CALCIUM CARB/MAGNESIUM 324 MG
81 TABLET ORAL DAILY
Refills: 0 | Status: DISCONTINUED | OUTPATIENT
Start: 2020-07-04 | End: 2020-07-07

## 2020-07-04 RX ADMIN — Medication 3 MILLILITER(S): at 09:05

## 2020-07-04 RX ADMIN — ESCITALOPRAM OXALATE 15 MILLIGRAM(S): 10 TABLET, FILM COATED ORAL at 13:31

## 2020-07-04 RX ADMIN — Medication 10 MILLIGRAM(S): at 05:07

## 2020-07-04 RX ADMIN — Medication 25 MILLIGRAM(S): at 17:37

## 2020-07-04 RX ADMIN — Medication 20 MILLIGRAM(S): at 05:07

## 2020-07-04 RX ADMIN — RISPERIDONE 0.25 MILLIGRAM(S): 4 TABLET ORAL at 21:10

## 2020-07-04 RX ADMIN — Medication 1 TABLET(S): at 13:30

## 2020-07-04 RX ADMIN — Medication 3 MILLILITER(S): at 20:41

## 2020-07-04 RX ADMIN — Medication 137 MICROGRAM(S): at 05:07

## 2020-07-04 RX ADMIN — Medication 81 MILLIGRAM(S): at 13:31

## 2020-07-04 RX ADMIN — Medication 0.25 MILLIGRAM(S): at 21:10

## 2020-07-04 RX ADMIN — Medication 25 MILLIGRAM(S): at 05:07

## 2020-07-04 RX ADMIN — QUETIAPINE FUMARATE 25 MILLIGRAM(S): 200 TABLET, FILM COATED ORAL at 21:10

## 2020-07-04 RX ADMIN — Medication 0.25 MILLIGRAM(S): at 05:07

## 2020-07-04 RX ADMIN — Medication 1 TABLET(S): at 17:37

## 2020-07-04 RX ADMIN — Medication 3 MILLILITER(S): at 15:13

## 2020-07-04 RX ADMIN — HEPARIN SODIUM 5000 UNIT(S): 5000 INJECTION INTRAVENOUS; SUBCUTANEOUS at 17:37

## 2020-07-04 RX ADMIN — QUETIAPINE FUMARATE 25 MILLIGRAM(S): 200 TABLET, FILM COATED ORAL at 13:31

## 2020-07-04 NOTE — PROGRESS NOTE ADULT - ASSESSMENT
83 yo male with dementia . hypothyroidsm  , hypertension admitted for weakness , hypoxic from assisted living , initially concerned about pneumonia , however CT of chest neg for infection , hypoxia improved       1- Weakness unclear etiology , ? dehydration   iv fluid x1 liter   will stop lasix   echo normal EF   ambulate with PT assistance     2-Hypoxia on arrivial not clear etilogy   CTA of chest ;PNA is ruled out , no PE   cont to monitor     3- hypokalemia   replace Po K ordered     4- Hypothyroid   cont levothyroxine

## 2020-07-04 NOTE — CHART NOTE - NSCHARTNOTEFT_GEN_A_CORE
84 year old male patient with a medical history of ITP, hypothyroidism, asthma, anxiety/depression, cholecystectomy, and h/o TEVAR (unknown date/place) presented to [facility] 7/3/20 after being found hypoxic at his assisted living facility. Patient admitted to Medicine service for generalized fatigue, weakness, and hypoxia requiring O2 therapy. CT surgery consult called after incidental finding of a Type B aortic dissection repair on CTA chest.  Case discussed with Dr. Chew in AM rounds.  No acute findings on CT scan.  Pt may have DVT prophylaxis.  No intervention from CTS standpoint. Will sign off.  Please reconsult as necessary.

## 2020-07-04 NOTE — CONSULT NOTE ADULT - ASSESSMENT
Assessment:  84 year old male patient with a medical history of ITP, hypothyroidism, asthma, anxiety/depression, cholecystectomy, and h/o TEVAR (unknown date/place) presented to [facility] 7/3/20 after being found hypoxic at his assisted living facility. Patient admitted to Medicine service for generalized fatigue, weakness, and hypoxia requiring O2 therapy. CT surgery consult called after incidental finding of a Type B aortic dissection repair on CTA chest.     Plan:  Patient remains hemodynamically stable at this time.   BP and HR remain controlled.   Pulses symmetrical in b/l UEs and b/l LEs.   Imaging reviewed with TEVAR graft placed within dissection.   Unknown date or place of previous surgery (patient states he "had a pipe inserted in my stomach by a bald doctor in [place]").  Case and plan discussed with CT Surgeon Dr. Chew. To be discussed further in AM rounds.

## 2020-07-04 NOTE — INPATIENT CERTIFICATION FOR MEDICARE PATIENTS - PHYSICIAN CONCUR
I concur with the Admission Order and I certify that services are provided in accordance with Section 42 CFR § 412.3
ACP

## 2020-07-04 NOTE — CHART NOTE - NSCHARTNOTEFT_GEN_A_CORE
Called by RN to report infiltrated IV. Pt seen and assessed, no acute distress, denies pain. Left upper arm with area of mild swelling and redness, fairly soft. Pt was getting Levaquin infusion through that IV.  A/P:  IV infiltration  -d/c IV  -elevate site  -apply warm packs  -Tylenol prn for pain

## 2020-07-04 NOTE — PROGRESS NOTE ADULT - ASSESSMENT
84 year old male patient with a medical history of ITP, hypothyroidism, asthma, anxiety/depression, cholecystectomy, and h/o TEVAR (unknown date/place) presented to [facility] 7/3/20 after being found hypoxic at his assisted living facility. Patient admitted to Medicine service for generalized fatigue, weakness, and hypoxia requiring O2 therapy. CT surgery consult called after incidental finding of a Type B aortic dissection repair on CTA chest.     Plan:  Patient remains hemodynamically stable at this time.   BP and HR remain controlled.   Pulses symmetrical in b/l UEs and b/l LEs.   Imaging reviewed with TEVAR graft placed within dissection.   Unknown date or place of previous surgery (patient states he "had a pipe inserted in my stomach by a bald doctor in [place]").  Case and plan discussed with CT Surgeon Dr. Chew. To be discussed further in AM rounds. Assessment:  84 year old male patient with a medical history of ITP, hypothyroidism, asthma, anxiety/depression, cholecystectomy, and h/o TEVAR (unknown date/place) presented to [facility] 7/3/20 after being found hypoxic at his assisted living facility. Patient admitted to Medicine service for generalized fatigue, weakness, and hypoxia requiring O2 therapy. CT surgery consult called after incidental finding of a Type B aortic dissection repair on CTA chest.     Plan:  Patient remains hemodynamically stable at this time.   BP and HR remain controlled.   Pulses symmetrical in b/l UEs and b/l LEs.   Imaging reviewed with TEVAR graft placed within dissection.   Unknown date or place of previous surgery (patient states he "had a pipe inserted in my stomach by a bald doctor in [place]").  Case and plan discussed with CT Surgeon Dr. Chew. To be discussed further in AM rounds.

## 2020-07-04 NOTE — CONSULT NOTE ADULT - SUBJECTIVE AND OBJECTIVE BOX
Consult for surgeon:  Dr. Chew    Consult called for type B dissection repair seen on CT chest    Patient admitted to Medicine after arriving with Hypoxia, weakness, and fatigue.    HPI:  85 y/o male was brought in by EMS from [facility] living in [place] for generalized weakness and fatigue, O2 sat was reported 70s% RA and EMS=80s%; pt. has h/o asthma.  As per ER physician on presentation pt was not cooperative with history and stated he feels depressed because his "house in [place] got sold." Pt has a h/o depression, anxiety. At the time of admission pt is very co-operative, giving good history but not sure why he was sent to the hospital and wants to go back. Pt. stated that he sometimes feels depressed but reports no si/hi. Pt is aaox3. Pt reports no cp, no cough, no fever, no sob, no abd. pain, no n/v/d. resting in the bed without any distress. pt's o2 sat in the er with 2 L NC is 98 %. (2020 18:20)    Review of systems:  GENERAL:  Fevers[] chills[] sweats[] fatigue[X] weight loss[] weight gain []                                        NEURO:  parathesias[] seizures []  syncope []  confusion [X]                                                                                  EYES: glasses[]  blurry vision[]  discharge[] pain[]                                                                        ENMT:  difficulty hearing []  vertigo[]  dysphagia[] epistaxis[] recent dental work []                                      CV:  chest pain[] palpitations[] DEUTSCH [] diaphoresis [] edema[]                                                                                             RESPIRATORY:  wheezing[X] SOB[] cough [] sputum[] hemoptysis[]                                                                    GI:  nausea[]  vomiting []  diarrhea[] constipation [] melena []                                                                        : hematuria[]  dysuria[]                                                                                   MUSKULOSKELETAL:  arthritis[]  joint swelling []                                                              SKIN/BREAST:  rash[] itching []  hair loss[] masses[]                                                                                                PSYCH:  dementia [] depression [X] anxiety[X]                                                                                                                  HEME/LYMPH:  bruises easily[]      Past Medical/Surgical History:  Idiopathic thrombocytopenia  Hypothyroidism  Asthma  Major depression  Anxiety disorder  S/P cholecystectomy  Aortic dissection: repair    Standing Medications:  albuterol/ipratropium for Nebulization 3 milliLiter(s) Nebulizer every 6 hours  ALPRAZolam 0.25 milliGRAM(s) Oral two times a day  escitalopram 15 milliGRAM(s) Oral daily  furosemide    Tablet 20 milliGRAM(s) Oral daily  lactobacillus acidophilus 1 Tablet(s) Oral three times a day with meals  levoFLOXacin IVPB 500 milliGRAM(s) IV Intermittent every 24 hours  levothyroxine 137 MICROGram(s) Oral daily  metoprolol tartrate 25 milliGRAM(s) Oral two times a day  predniSONE   Tablet 10 milliGRAM(s) Oral daily  QUEtiapine 25 milliGRAM(s) Oral two times a day  risperiDONE   Tablet 0.25 milliGRAM(s) Oral at bedtime    PRN Medications:  melatonin 3 milliGRAM(s) Oral at bedtime PRN Insomnia    Anti-coagulation: None    Allergies: penicillin (Rash)    Social History: No current smoking/alcohol/ or drug use as per patient.     Residence: [facility] in [facility]    Vital Signs:  T(F): 98 (20 @ 18:51)  HR: 80 (20 @ 18:51)  BP: 132/80 (20 @ 18:51)  RR: 18 (20 @ 18:51)  SpO2: 98% (20 @ 18:51)    Physical Exam:  General: Well nourished, well developed, no acute distress.                                                Neuro: Oriented to person, place, and time. Unable to recall full medical history.                    Neck: no JVD noted  Chest: +Scattered wheezing b/l. No accessory muscle use noted.                                                                   CV:  Regular rate and rhythm, S1S2  Carotids: No Bruits appreciated                                                                  GI: soft, NT, ND, normoactive bowel sounds                                                                                             Extremities: No edema of b/l lower extremities   Lower Extremity Pulses: + DP, popliteal, femoral pulses b/l lower extremities. +Strong and equal pulses b/l UEs.   Psych: Mildly anxious nervous during questioning. Confused, doesn't remember why he is in the hospital.                           11.3   11.24 )-----------( 180      ( 2020 15:48 )             34.0     07-    136  |  99  |  16.0  ----------------------------<  102<H>  3.8   |  26.0  |  1.34<H>    Ca    8.7      2020 15:48  Phos  3.3     07-03  Mg     1.7     07-03    TPro  6.9  /  Alb  3.4  /  TBili  0.3<L>  /  DBili  x   /  AST  15  /  ALT  11  /  AlkPhos  111  07-03    PT/INR - ( 2020 15:48 )   PT: 12.7 sec;   INR: 1.10 ratio      PTT - ( 2020 15:48 )  PTT:29.1 sec  Urinalysis Basic - ( 2020 21:38 )    Color: Yellow / Appearance: Clear / S.020 / pH: x  Gluc: x / Ketone: Negative  / Bili: Negative / Urobili: Negative mg/dL   Blood: x / Protein: 15 mg/dL / Nitrite: Negative   Leuk Esterase: Negative / RBC: x / WBC x   Sq Epi: x / Non Sq Epi: x / Bacteria: x    ABG - ( 2020 20:23 )  pH, Arterial: 7.48  pH, Blood: x     /  pCO2: 37    /  pO2: 116   / HCO3: 28    / Base Excess: 4.1   /  SaO2: 99          Imaging  < from: CT Angio Chest w/ IV Cont (20 @ 21:34) >  FINDINGS:   There is good opacification of pulmonary arterial tree, however, there is motion artifact present which degrades image quality andlimits the peripheral vasculature beyond the subsegmental vessels. No central, lobar or segmental filling defect is present to suggest acute pulmonary embolus.  The thyroid gland is unremarkable.   Evaluation of the lung parenchyma demonstrates no suspicious pulmonary nodule or mass. There is no evidence pneumonia. There is mild paraseptal emphysema end focal atelectasis in the right lung base. There is no pneumothorax.  There is no pleural effusion. The tracheobronchial tree is clear.  There is no significant axillary, mediastinal or hilar adenopathy.  The heart is within normal limits of size. The patient is status post repair of type B aortic dissection. The distal descending thoracic aorta at the hiatus measures up to 6.5 cm in transaxial dimension. Limited assessment of the thoracic aorta due to suboptimal opacification.  Limited images of the upper abdomen demonstrate no acute abnormality. Patient status post cholecystectomy.  Degenerative changes in the thoracic spine. There is generalized osteopenia. No suspicious osteoblastic or lytic lesion.  IMPRESSION:  Motion degraded exam. No gross evidence of pulmonary embolism.  < end of copied text >

## 2020-07-05 LAB — NT-PROBNP SERPL-SCNC: 295 PG/ML — SIGNIFICANT CHANGE UP (ref 0–300)

## 2020-07-05 PROCEDURE — 99232 SBSQ HOSP IP/OBS MODERATE 35: CPT

## 2020-07-05 PROCEDURE — 93010 ELECTROCARDIOGRAM REPORT: CPT

## 2020-07-05 PROCEDURE — 99223 1ST HOSP IP/OBS HIGH 75: CPT

## 2020-07-05 PROCEDURE — 93970 EXTREMITY STUDY: CPT | Mod: 26

## 2020-07-05 RX ORDER — OXYCODONE HYDROCHLORIDE 5 MG/1
5 TABLET ORAL ONCE
Refills: 0 | Status: DISCONTINUED | OUTPATIENT
Start: 2020-07-05 | End: 2020-07-05

## 2020-07-05 RX ORDER — ACETAMINOPHEN 500 MG
650 TABLET ORAL ONCE
Refills: 0 | Status: COMPLETED | OUTPATIENT
Start: 2020-07-05 | End: 2020-07-05

## 2020-07-05 RX ORDER — MORPHINE SULFATE 50 MG/1
2 CAPSULE, EXTENDED RELEASE ORAL ONCE
Refills: 0 | Status: DISCONTINUED | OUTPATIENT
Start: 2020-07-05 | End: 2020-07-05

## 2020-07-05 RX ORDER — POTASSIUM CHLORIDE 20 MEQ
20 PACKET (EA) ORAL ONCE
Refills: 0 | Status: COMPLETED | OUTPATIENT
Start: 2020-07-05 | End: 2020-07-05

## 2020-07-05 RX ADMIN — ESCITALOPRAM OXALATE 10 MILLIGRAM(S): 10 TABLET, FILM COATED ORAL at 12:30

## 2020-07-05 RX ADMIN — QUETIAPINE FUMARATE 25 MILLIGRAM(S): 200 TABLET, FILM COATED ORAL at 12:29

## 2020-07-05 RX ADMIN — Medication 1 TABLET(S): at 17:40

## 2020-07-05 RX ADMIN — Medication 25 MILLIGRAM(S): at 17:40

## 2020-07-05 RX ADMIN — HEPARIN SODIUM 5000 UNIT(S): 5000 INJECTION INTRAVENOUS; SUBCUTANEOUS at 20:32

## 2020-07-05 RX ADMIN — Medication 3 MILLILITER(S): at 02:49

## 2020-07-05 RX ADMIN — OXYCODONE HYDROCHLORIDE 5 MILLIGRAM(S): 5 TABLET ORAL at 21:57

## 2020-07-05 RX ADMIN — Medication 10 MILLIGRAM(S): at 06:01

## 2020-07-05 RX ADMIN — Medication 137 MICROGRAM(S): at 06:00

## 2020-07-05 RX ADMIN — Medication 20 MILLIEQUIVALENT(S): at 21:57

## 2020-07-05 RX ADMIN — Medication 3 MILLILITER(S): at 15:20

## 2020-07-05 RX ADMIN — Medication 1 TABLET(S): at 12:30

## 2020-07-05 RX ADMIN — Medication 650 MILLIGRAM(S): at 20:31

## 2020-07-05 RX ADMIN — Medication 25 MILLIGRAM(S): at 06:00

## 2020-07-05 RX ADMIN — HEPARIN SODIUM 5000 UNIT(S): 5000 INJECTION INTRAVENOUS; SUBCUTANEOUS at 12:31

## 2020-07-05 RX ADMIN — Medication 1 TABLET(S): at 07:55

## 2020-07-05 RX ADMIN — Medication 81 MILLIGRAM(S): at 12:31

## 2020-07-05 RX ADMIN — Medication 0.25 MILLIGRAM(S): at 20:31

## 2020-07-05 RX ADMIN — RISPERIDONE 0.25 MILLIGRAM(S): 4 TABLET ORAL at 23:07

## 2020-07-05 RX ADMIN — QUETIAPINE FUMARATE 25 MILLIGRAM(S): 200 TABLET, FILM COATED ORAL at 23:07

## 2020-07-05 NOTE — CONSULT NOTE ADULT - SUBJECTIVE AND OBJECTIVE BOX
Patient an 83 y/o  male, domiciled at " [facility] ", unknown past psychiatric Hx; hx of Dementia, Hypo-Thyroidism; BPH was admitted due to Pneumonia with Hypoxia.     Patient is alert, poor orientation, was able to mention month after prompting, does not know where he was living before coming here. He endorsed that he and wife lives in [place], and they are here to visit and plans to go back to [place]. He added that he was a  for years and loves his wife and would love to go home. He does not think that he has any memory issues, and feels that "it's not good to get old, it's depressing" He started to cry and was tearful and added that he has 3 children who lives in [place], his daughter is a Psychiatrist and is also in [place] who went to [place]. As per RN he was aggressive, and pulled out IV lines today AM and was also agitated. He has fair top good sleep/appetite. Unknown prior Psychiatric Hx, or any drug abuse hx.     Past Psychiatric Hx:  Unknown  prior Hx of depression por SI/SA or Psychiatric Hospitalization.     Family Hx: Negative    Social Hx:  male, domiciled in an assisted living, has Masters and was a  for the 5th grade for years    MSE: Patient a 83 y/o male, looks older than stated age, dressed in hospital gown, alert, not oriented to time and place. Mood seems depressed, tearful with teary affect. Speech is of normal vol/tone. Gait seems OK, Thoughts are liner, but irrelevant due to poor memory. Not S/H at this time. Memory seems poor, with poor recall, Impulse control, variable due to changing mood, poor focus/attention    Diagnosis: Dementia with Behavioral disturbances    Labs:                       11.9   11.21 )-----------( 166      ( 04 Jul 2020 07:10 )             35.9   07-04    136  |  98  |  15.0  ----------------------------<  116<H>  3.3<L>   |  24.0  |  1.23    < from: 12 Lead ECG (07.04.20 @ 06:58) >  Ventricular Rate 84 BPM    Atrial Rate 84 BPM    P-R Interval 168 ms    QRS Duration 104 ms    Q-T Interval 380 ms    QTC Calculation(Bezet) 449 ms    P Axis 8 degrees    R Axis -23 degrees    T Axis 53 degrees    Diagnosis Line Sinus rhythm with Premature atrial complexes      Confirmed by WALI EUGENE ([id number]) on 7/5/2020 11:30:13 AM    MEDICATIONS  (STANDING):  albuterol/ipratropium for Nebulization 3 milliLiter(s) Nebulizer every 6 hours  aspirin enteric coated 81 milliGRAM(s) Oral daily  escitalopram 15 milliGRAM(s) Oral daily  heparin   Injectable 5000 Unit(s) SubCutaneous every 12 hours  lactobacillus acidophilus 1 Tablet(s) Oral three times a day with meals  levoFLOXacin  Tablet 750 milliGRAM(s) Oral once  levothyroxine 137 MICROGram(s) Oral daily  metoprolol tartrate 25 milliGRAM(s) Oral two times a day  predniSONE   Tablet 10 milliGRAM(s) Oral daily  QUEtiapine 25 milliGRAM(s) Oral two times a day  risperiDONE   Tablet 0.25 milliGRAM(s) Oral at bedtime          Ca    8.9      04 Jul 2020 07:10  Phos  3.3     07-03  Mg     1.8     07-04    TPro  6.9  /  Alb  3.4  /  TBili  0.3<L>  /  DBili  x   /  AST  15  /  ALT  11  /  AlkPhos  111  07-03    CT Head: Moderate Micro vascular changes

## 2020-07-05 NOTE — PROGRESS NOTE ADULT - ASSESSMENT
85 yo male with dementia . hypothyroidsm  , hypertension admitted for weakness , hypoxic from assisted living , initially concerned about pneumonia , however CT of chest neg for infection , hypoxia improving       1- Weakness unclear etiology , ? dehydration   iv fluid x1 liter given   repeat BMP in am   off lasix   TTE result reviewed     2-Hypoxia on admission improving 'etiology ?     CTA of chest ;PNA is ruled out , no PE   will check pulse ox on RA and ambulation   may need to get discharged with ox     3- Agitated with underlying dementia likely behavioral disturbances   cont seroquel   pulled iv , 1 :1 ordered   psychiatry  consult called , pending 'need SW evaluation re decision making   family / sons are  not willing to take responsibility ? , wife at NH as well     4- hypokalemia   replaced ,  Po K given     5- Hypothyroid   cont levothyroxine

## 2020-07-05 NOTE — CONSULT NOTE ADULT - ASSESSMENT
Patient an 85 y/o  male, domiciled at " [facility] ", unknown past psychiatric Hx; hx of Dementia, Hypo-Thyroidism; BPH was admitted due to Pneumonia with Hypoxia.     Patient is alert, poor orientation, was able to mention month after prompting, does not know where he was living before coming here. He endorsed that he and wife lives in [place], and they are here to visit and plans to go back to [place]. He added that he was a  for years and loves his wife and would love to go home. He does not think that he has any memory issues, and feels that "it's not good to get old, it's depressing" He started to cry and was tearful and added that he has 3 children who lives in [place], his daughter is a Psychiatrist and is also in [place] who went to [place]. As per RN he was aggressive, and pulled out IV lines today AM and was also agitated. He has fair top good sleep/appetite. Unknown prior Psychiatric Hx, or any drug abuse hx.     Patient mood variable due to poor impulsivity, partly due to steroid use due to Pneumonia with Hypoxia and also has behavioral disturbances with Dementia. As per RN he is Demented for 18m months now.    Plan: To continue Lexapro 15 mg daily          To continues Seroquel 25 mg BID          To continue Risperdal 0.25 mg HS          To add Namenda 5 mg daily for 7 days and then to increase Namenda to 5 mg BID for behavioral control          To continue 1:1 for safety    Thank You for consult

## 2020-07-05 NOTE — CONSULT NOTE ADULT - REASON FOR ADMISSION
pneumonia / hypoxia
rhythm, normal heart sounds and intact distal pulses. No murmur heard. Pulmonary/Chest: Effort normal and breath sounds normal. No respiratory distress. She exhibits no tenderness. Abdominal: Soft. Bowel sounds are normal. She exhibits no distension. There is no tenderness. Musculoskeletal: Normal range of motion. She exhibits no edema or deformity. Neurological: She is alert and oriented to person, place, and time. No cranial nerve deficit. She exhibits normal muscle tone. Coordination normal.   Skin: Skin is warm and dry. Psychiatric: She has a normal mood and affect. Nursing note and vitals reviewed. Procedures    MDM  Number of Diagnoses or Management Options  Acute sinusitis, recurrence not specified, unspecified location:   Hypertension, unspecified type:   Diagnosis management comments: This is a 69-year-old female that presents with high blood pressure readings. She is status post coronary artery stent 2 years ago and has been compliant with her Plavix. She also states that at times she gets headaches, hot flashes, chest pains and shortness of breath. Patient does have elevated blood pressure over 032 systolic but otherwise her initial vital signs are stable. She states that the metoprolol she was recently started on makes her feel very tired and she does not want to keep taking it. The pressure recheck manual is 192/99. She has an elevated white count of 14.1 but she has been on prednisone started by her primary care a few days ago. Troponin and EKG are unremarkable. CT head is obtained and reveals some evidence of sinusitis. The patient will be given a Z-Julio upon discharge and instructed to follow-up with her primary care physician. She will call her physician in the morning to discuss blood pressure medications as she does not want to continue taking what he has prescribed for her. All questions answered and the patient is discharged in acute distress.        Amount and/or Complexity of
Pneumonia / Hypoxia

## 2020-07-06 PROBLEM — Z00.00 ENCOUNTER FOR PREVENTIVE HEALTH EXAMINATION: Status: ACTIVE | Noted: 2020-07-06

## 2020-07-06 LAB
24R-OH-CALCIDIOL SERPL-MCNC: 18.3 NG/ML — LOW (ref 30–80)
ANION GAP SERPL CALC-SCNC: 13 MMOL/L — SIGNIFICANT CHANGE UP (ref 5–17)
BUN SERPL-MCNC: 26 MG/DL — HIGH (ref 8–20)
CALCIUM SERPL-MCNC: 9.1 MG/DL — SIGNIFICANT CHANGE UP (ref 8.6–10.2)
CHLORIDE SERPL-SCNC: 100 MMOL/L — SIGNIFICANT CHANGE UP (ref 98–107)
CO2 SERPL-SCNC: 25 MMOL/L — SIGNIFICANT CHANGE UP (ref 22–29)
CREAT SERPL-MCNC: 1.47 MG/DL — HIGH (ref 0.5–1.3)
GLUCOSE SERPL-MCNC: 98 MG/DL — SIGNIFICANT CHANGE UP (ref 70–99)
HCT VFR BLD CALC: 32.5 % — LOW (ref 39–50)
HGB BLD-MCNC: 10.7 G/DL — LOW (ref 13–17)
MCHC RBC-ENTMCNC: 30.7 PG — SIGNIFICANT CHANGE UP (ref 27–34)
MCHC RBC-ENTMCNC: 32.9 GM/DL — SIGNIFICANT CHANGE UP (ref 32–36)
MCV RBC AUTO: 93.4 FL — SIGNIFICANT CHANGE UP (ref 80–100)
PLATELET # BLD AUTO: 144 K/UL — LOW (ref 150–400)
POTASSIUM SERPL-MCNC: 3.3 MMOL/L — LOW (ref 3.5–5.3)
POTASSIUM SERPL-SCNC: 3.3 MMOL/L — LOW (ref 3.5–5.3)
RBC # BLD: 3.48 M/UL — LOW (ref 4.2–5.8)
RBC # FLD: 14.1 % — SIGNIFICANT CHANGE UP (ref 10.3–14.5)
SODIUM SERPL-SCNC: 138 MMOL/L — SIGNIFICANT CHANGE UP (ref 135–145)
VIT B12 SERPL-MCNC: 177 PG/ML — LOW (ref 232–1245)
WBC # BLD: 7.98 K/UL — SIGNIFICANT CHANGE UP (ref 3.8–10.5)
WBC # FLD AUTO: 7.98 K/UL — SIGNIFICANT CHANGE UP (ref 3.8–10.5)

## 2020-07-06 PROCEDURE — 99233 SBSQ HOSP IP/OBS HIGH 50: CPT

## 2020-07-06 RX ORDER — POTASSIUM CHLORIDE 20 MEQ
10 PACKET (EA) ORAL
Refills: 0 | Status: COMPLETED | OUTPATIENT
Start: 2020-07-06 | End: 2020-07-06

## 2020-07-06 RX ORDER — PREGABALIN 225 MG/1
1000 CAPSULE ORAL DAILY
Refills: 0 | Status: DISCONTINUED | OUTPATIENT
Start: 2020-07-06 | End: 2020-07-07

## 2020-07-06 RX ORDER — SODIUM CHLORIDE 9 MG/ML
1000 INJECTION INTRAMUSCULAR; INTRAVENOUS; SUBCUTANEOUS
Refills: 0 | Status: DISCONTINUED | OUTPATIENT
Start: 2020-07-06 | End: 2020-07-07

## 2020-07-06 RX ORDER — POTASSIUM CHLORIDE 20 MEQ
40 PACKET (EA) ORAL ONCE
Refills: 0 | Status: COMPLETED | OUTPATIENT
Start: 2020-07-06 | End: 2020-07-06

## 2020-07-06 RX ADMIN — Medication 1 TABLET(S): at 08:42

## 2020-07-06 RX ADMIN — Medication 40 MILLIEQUIVALENT(S): at 18:42

## 2020-07-06 RX ADMIN — Medication 100 MILLIGRAM(S): at 04:56

## 2020-07-06 RX ADMIN — Medication 0.25 MILLIGRAM(S): at 20:18

## 2020-07-06 RX ADMIN — Medication 100 MILLIEQUIVALENT(S): at 18:43

## 2020-07-06 RX ADMIN — RISPERIDONE 0.25 MILLIGRAM(S): 4 TABLET ORAL at 23:27

## 2020-07-06 RX ADMIN — Medication 100 MILLIEQUIVALENT(S): at 17:22

## 2020-07-06 RX ADMIN — Medication 25 MILLIGRAM(S): at 17:22

## 2020-07-06 RX ADMIN — Medication 81 MILLIGRAM(S): at 12:33

## 2020-07-06 RX ADMIN — Medication 1 TABLET(S): at 17:22

## 2020-07-06 RX ADMIN — HEPARIN SODIUM 5000 UNIT(S): 5000 INJECTION INTRAVENOUS; SUBCUTANEOUS at 23:27

## 2020-07-06 RX ADMIN — Medication 3 MILLILITER(S): at 09:04

## 2020-07-06 RX ADMIN — Medication 137 MICROGRAM(S): at 04:46

## 2020-07-06 RX ADMIN — HEPARIN SODIUM 5000 UNIT(S): 5000 INJECTION INTRAVENOUS; SUBCUTANEOUS at 12:32

## 2020-07-06 RX ADMIN — QUETIAPINE FUMARATE 25 MILLIGRAM(S): 200 TABLET, FILM COATED ORAL at 12:33

## 2020-07-06 RX ADMIN — Medication 1 TABLET(S): at 12:32

## 2020-07-06 RX ADMIN — QUETIAPINE FUMARATE 25 MILLIGRAM(S): 200 TABLET, FILM COATED ORAL at 23:27

## 2020-07-06 RX ADMIN — Medication 25 MILLIGRAM(S): at 04:46

## 2020-07-06 RX ADMIN — Medication 10 MILLIGRAM(S): at 04:46

## 2020-07-06 RX ADMIN — SODIUM CHLORIDE 85 MILLILITER(S): 9 INJECTION INTRAMUSCULAR; INTRAVENOUS; SUBCUTANEOUS at 17:22

## 2020-07-06 RX ADMIN — ESCITALOPRAM OXALATE 15 MILLIGRAM(S): 10 TABLET, FILM COATED ORAL at 12:33

## 2020-07-06 NOTE — PROGRESS NOTE ADULT - ASSESSMENT
83 yo male with dementia . hypothyroidsm  , hypertension admitted for weakness , hypoxic from assisted living , initially concerned about pneumonia , however CT of chest neg for infection , hypoxia improving       1-Hypoxia weakness on admission   improved   CTA of chest ;PNA is ruled out , no PE   will check pulse ox on RA and ambulation which is over 89 %   TTE ordered     2- ARF on CRF stage 3 likely due to poor oral intake /  dehydration   will start iv fluid   trend cr   get bladder scan r/o retention     3- Agitated with underlying dementia likely behavioral disturbances   cont seroquel   discontinue 1:1   psychiatry follow up noted   pt is with h/o depression and agressive  behaviour   Sw is on board   will discharge back to assisted living soon if cr improves     4- hypokalemia   replaced     5- Hypothyroid   cont levothyroxine

## 2020-07-06 NOTE — PHYSICAL THERAPY INITIAL EVALUATION ADULT - ADDITIONAL COMMENTS
as per pt. lives with Son in the private house, wife in the hospital due to back pain, recently moved to [facility], expressing his anger towards children that sold his house, ambulation with RW,

## 2020-07-06 NOTE — PHYSICAL THERAPY INITIAL EVALUATION ADULT - DISCHARGE DISPOSITION, PT EVAL
Assisted Living with PT services vs Subacute Rehab, rehab disposition recommendation may be change base on patient  functional progress

## 2020-07-06 NOTE — PHYSICAL THERAPY INITIAL EVALUATION ADULT - IMPAIRMENTS FOUND, PT EVAL
gait, locomotion, and balance/neuromotor development and sensory integration/aerobic capacity/endurance/muscle strength

## 2020-07-06 NOTE — PHYSICAL THERAPY INITIAL EVALUATION ADULT - CRITERIA FOR SKILLED THERAPEUTIC INTERVENTIONS
predicted duration of therapy intervention/impairments found/therapy frequency/anticipated equipment needs at discharge/risk reduction/prevention/rehab potential/anticipated discharge recommendation/functional limitations in following categories

## 2020-07-07 ENCOUNTER — TRANSCRIPTION ENCOUNTER (OUTPATIENT)
Age: 85
End: 2020-07-07

## 2020-07-07 VITALS
RESPIRATION RATE: 19 BRPM | DIASTOLIC BLOOD PRESSURE: 56 MMHG | TEMPERATURE: 98 F | OXYGEN SATURATION: 93 % | HEART RATE: 75 BPM | SYSTOLIC BLOOD PRESSURE: 95 MMHG

## 2020-07-07 DIAGNOSIS — F03.91 UNSPECIFIED DEMENTIA WITH BEHAVIORAL DISTURBANCE: ICD-10-CM

## 2020-07-07 DIAGNOSIS — F05 DELIRIUM DUE TO KNOWN PHYSIOLOGICAL CONDITION: ICD-10-CM

## 2020-07-07 DIAGNOSIS — F32.9 MAJOR DEPRESSIVE DISORDER, SINGLE EPISODE, UNSPECIFIED: ICD-10-CM

## 2020-07-07 LAB
ANION GAP SERPL CALC-SCNC: 12 MMOL/L — SIGNIFICANT CHANGE UP (ref 5–17)
BUN SERPL-MCNC: 21 MG/DL — HIGH (ref 8–20)
CALCIUM SERPL-MCNC: 8.8 MG/DL — SIGNIFICANT CHANGE UP (ref 8.6–10.2)
CHLORIDE SERPL-SCNC: 105 MMOL/L — SIGNIFICANT CHANGE UP (ref 98–107)
CO2 SERPL-SCNC: 23 MMOL/L — SIGNIFICANT CHANGE UP (ref 22–29)
CREAT SERPL-MCNC: 1.46 MG/DL — HIGH (ref 0.5–1.3)
GLUCOSE SERPL-MCNC: 87 MG/DL — SIGNIFICANT CHANGE UP (ref 70–99)
MAGNESIUM SERPL-MCNC: 1.8 MG/DL — SIGNIFICANT CHANGE UP (ref 1.6–2.6)
PHOSPHATE SERPL-MCNC: 2.8 MG/DL — SIGNIFICANT CHANGE UP (ref 2.4–4.7)
POTASSIUM SERPL-MCNC: 3.9 MMOL/L — SIGNIFICANT CHANGE UP (ref 3.5–5.3)
POTASSIUM SERPL-SCNC: 3.9 MMOL/L — SIGNIFICANT CHANGE UP (ref 3.5–5.3)
SODIUM SERPL-SCNC: 140 MMOL/L — SIGNIFICANT CHANGE UP (ref 135–145)

## 2020-07-07 PROCEDURE — 99233 SBSQ HOSP IP/OBS HIGH 50: CPT

## 2020-07-07 PROCEDURE — 99239 HOSP IP/OBS DSCHRG MGMT >30: CPT

## 2020-07-07 RX ORDER — PREGABALIN 225 MG/1
1 CAPSULE ORAL
Qty: 0 | Refills: 0 | DISCHARGE
Start: 2020-07-07

## 2020-07-07 RX ORDER — QUETIAPINE FUMARATE 200 MG/1
1 TABLET, FILM COATED ORAL
Qty: 0 | Refills: 0 | DISCHARGE
Start: 2020-07-07

## 2020-07-07 RX ORDER — ERGOCALCIFEROL 1.25 MG/1
50000 CAPSULE ORAL
Refills: 0 | Status: DISCONTINUED | OUTPATIENT
Start: 2020-07-07 | End: 2020-07-07

## 2020-07-07 RX ORDER — ESCITALOPRAM OXALATE 10 MG/1
20 TABLET, FILM COATED ORAL DAILY
Refills: 0 | Status: DISCONTINUED | OUTPATIENT
Start: 2020-07-07 | End: 2020-07-07

## 2020-07-07 RX ORDER — ASPIRIN/CALCIUM CARB/MAGNESIUM 324 MG
1 TABLET ORAL
Qty: 0 | Refills: 0 | DISCHARGE
Start: 2020-07-07

## 2020-07-07 RX ORDER — MAGNESIUM SULFATE 500 MG/ML
2 VIAL (ML) INJECTION ONCE
Refills: 0 | Status: COMPLETED | OUTPATIENT
Start: 2020-07-07 | End: 2020-07-07

## 2020-07-07 RX ORDER — ESCITALOPRAM OXALATE 10 MG/1
1 TABLET, FILM COATED ORAL
Qty: 0 | Refills: 0 | DISCHARGE
Start: 2020-07-07

## 2020-07-07 RX ORDER — QUETIAPINE FUMARATE 200 MG/1
25 TABLET, FILM COATED ORAL DAILY
Refills: 0 | Status: DISCONTINUED | OUTPATIENT
Start: 2020-07-07 | End: 2020-07-07

## 2020-07-07 RX ORDER — QUETIAPINE FUMARATE 200 MG/1
50 TABLET, FILM COATED ORAL AT BEDTIME
Refills: 0 | Status: DISCONTINUED | OUTPATIENT
Start: 2020-07-07 | End: 2020-07-07

## 2020-07-07 RX ORDER — ERGOCALCIFEROL 1.25 MG/1
1 CAPSULE ORAL
Qty: 0 | Refills: 0 | DISCHARGE
Start: 2020-07-07

## 2020-07-07 RX ADMIN — Medication 137 MICROGRAM(S): at 06:22

## 2020-07-07 RX ADMIN — ERGOCALCIFEROL 50000 UNIT(S): 1.25 CAPSULE ORAL at 13:26

## 2020-07-07 RX ADMIN — Medication 1 TABLET(S): at 13:26

## 2020-07-07 RX ADMIN — Medication 25 MILLIGRAM(S): at 06:23

## 2020-07-07 RX ADMIN — Medication 50 GRAM(S): at 13:25

## 2020-07-07 RX ADMIN — Medication 0.25 MILLIGRAM(S): at 10:08

## 2020-07-07 RX ADMIN — HEPARIN SODIUM 5000 UNIT(S): 5000 INJECTION INTRAVENOUS; SUBCUTANEOUS at 10:10

## 2020-07-07 RX ADMIN — Medication 1 TABLET(S): at 10:09

## 2020-07-07 RX ADMIN — Medication 81 MILLIGRAM(S): at 10:09

## 2020-07-07 RX ADMIN — ESCITALOPRAM OXALATE 15 MILLIGRAM(S): 10 TABLET, FILM COATED ORAL at 10:10

## 2020-07-07 RX ADMIN — Medication 10 MILLIGRAM(S): at 06:23

## 2020-07-07 RX ADMIN — QUETIAPINE FUMARATE 25 MILLIGRAM(S): 200 TABLET, FILM COATED ORAL at 10:10

## 2020-07-07 RX ADMIN — PREGABALIN 1000 MICROGRAM(S): 225 CAPSULE ORAL at 10:10

## 2020-07-07 NOTE — DISCHARGE NOTE NURSING/CASE MANAGEMENT/SOCIAL WORK - PATIENT PORTAL LINK FT
You can access the FollowMyHealth Patient Portal offered by [facility] by registering at the following website: [email or web address]. By joining [facility]’s FollowMyHealth portal, you will also be able to view your health information using other applications (apps) compatible with our system.

## 2020-07-07 NOTE — PROGRESS NOTE BEHAVIORAL HEALTH - NSBHCONSULTRECOMMENDOTHER_PSY_A_CORE FT
I have raised dose of escitalopram and adjusted dosing of quetiapine D/c Risperdal due to concern over polypharmacy  suitable for [facility] placement

## 2020-07-07 NOTE — PROGRESS NOTE BEHAVIORAL HEALTH - NSBHFUPINTERVALHXFT_PSY_A_CORE
patient improving medically On o2 - 2 L nasal cannula  steroids being tapered  behaviorally calmer  denies any suicidal or violent urges  discussed his career as  and [employer] service 57-59 in [place]  feels sad at times tearful no clear reason Does miss his wife

## 2020-07-07 NOTE — PROGRESS NOTE BEHAVIORAL HEALTH - SUMMARY
agiation from delirium  has resolved Remains depressed but no dangerous symptoms elicited No longer on 1 to 1 cooperative

## 2020-07-07 NOTE — PROGRESS NOTE ADULT - ASSESSMENT
85 yo male with dementia . hypothyroidsm  , hypertension admitted for weakness , hypoxic from assisted living , initially concerned about pneumonia , however CT of chest neg for infection ,PE is ruled out ,  hypoxia improving . Pt is with worsening cr ARF       1-Hypoxia weakness on admission   improved   CTA of chest ;PNA is ruled out , no PE   will check pulse ox on RA and ambulation which is over 89 %   TTE ordered     2- ARF on CRF stage 3 likely due to poor oral intake   suspected  iv contrast induced nephropathy   cont iv fluid , cr stable   avoid nephrotoxic meds     3- Agitated with underlying dementia likely behavioral disturbances   cont seroquel and risperidone   h/o psychiatric disease  hospitalization reported by step son   need psychiatry  reevaluation       will discharge pending above evaluation     4- Vit b 12 deficiency   cont  iv vit b 12 supplement   then po on discharge     5- Hypothyroid   cont levothyroxine     6- vit D deficiency   ergocalciferol weekly ordered

## 2020-07-07 NOTE — PROGRESS NOTE ADULT - SUBJECTIVE AND OBJECTIVE BOX
Internal Medicine Hospitalist Progress Note    follow up hypoxia, weakness , he was resting in the bed seen earlier   after my visit , pt had became agitated       Vital Signs Last 24 Hrs  T(C): 36.6 (06 Jul 2020 10:30), Max: 36.7 (05 Jul 2020 20:02)  T(F): 97.9 (06 Jul 2020 10:30), Max: 98 (05 Jul 2020 20:02)  HR: 94 (06 Jul 2020 10:30) (58 - 108)  BP: 122/75 (06 Jul 2020 10:30) (105/63 - 130/76)  BP(mean): --  RR: 18 (06 Jul 2020 10:30) (18 - 18)  SpO2: 94% (06 Jul 2020 12:10) (89% - 99%)      Constitutional: awake alert , no distress     Respiratory: CTA bilateral, no rales no wheezing     Cardiovascular: regular s1 /s2     Gastrointestinal: soft no tenderness , BS positive     Extremities: + edema  bilaterally                                     10.7   7.98  )-----------( 144      ( 06 Jul 2020 06:22 )             32.5   07-06    138  |  100  |  26.0<H>  ----------------------------<  98  3.3<L>   |  25.0  |  1.47<H>    Ca    9.1      06 Jul 2020 06:22                  11.9   11.21 )-----------( 166      ( 04 Jul 2020 07:10 )             35.9   07-04    136  |  98  |  15.0  ----------------------------<  116<H>  3.3<L>   |  24.0  |  1.23    Ca    8.9      04 Jul 2020 07:10  Phos  3.3     07-03  Mg     1.8     07-04    TPro  6.9  /  Alb  3.4  /  TBili  0.3<L>  /  DBili  x   /  AST  15  /  ALT  11  /  AlkPhos  111  07-03      < from: TTE Echo Complete w/o Contrast w/ Doppler (07.04.20 @ 10:56) >  ummary:   1. Technically difficult study with poor endocardial visualization.   2. Normal global left ventricular systolic function.   3. Left ventricular ejection fraction, by visual estimation, is 55 to 60%.   4. Abnormal septal motion which may be due to intraventricular conduction delay.   5. Normal right ventricle size and systolic function.   6. The left atrium is normal in size.   7. The right atrium is normal in size.   8. Mild thickening and calcification of the anterior and posterior mitral valve leaflets.   9. Aortic leaflet calcification. No aortic stenosis.  10. Trivial pericardial effusion.  11. Recommend clinical correlation with the above findings.    Symone Cook MD Electronically signed on 7/4/2020 at 1:15:09 PM    < end of copied text >
Internal Medicine Hospitalist Progress Note    follow up hypoxia, weakness ,agitation   he is sleeping open eyes during my evaluation , reluctant to talk this am , states that he is tired could not sleep   step son reported to  that pt had been in the psychiatry facility in FEB hospitalized   Behavioral health reconsulted to evaluate the need for recurrent admission vs clearance to go to [facility]    on 2 liter oxygen placed last night   removes retested today hypoxic on RA 88 % lying in the bed per nurse see documents       Vital Signs Last 24 Hrs  T(C): 36.8 (07 Jul 2020 06:19), Max: 37 (06 Jul 2020 23:00)  T(F): 98.2 (07 Jul 2020 06:19), Max: 98.6 (06 Jul 2020 23:00)  HR: 88 (07 Jul 2020 06:19) (82 - 95)  BP: 115/71 (07 Jul 2020 06:19) (110/65 - 126/73)  BP(mean): --  RR: 19 (07 Jul 2020 06:19) (18 - 19)  SpO2: 98% (07 Jul 2020 06:19) (94% - 98%)      Constitutional: awake alert , no distress     Respiratory: CTA bilateral, no rales no wheezing     Cardiovascular: regular s1 /s2     Gastrointestinal: soft no tenderness , BS positive     Extremities: + edema  bilaterally                         10.7   7.98  )-----------( 144      ( 06 Jul 2020 06:22 )             32.5   07-07    140  |  105  |  21.0<H>  ----------------------------<  87  3.9   |  23.0  |  1.46<H>    Ca    8.8      07 Jul 2020 06:37  Phos  2.8     07-07  Mg     1.8     07-07             2. Normal global left ventricular systolic function.   3. Left ventricular ejection fraction, by visual estimation, is 55 to 60%.   4. Abnormal septal motion which may be due to intraventricular conduction delay.   5. Normal right ventricle size and systolic function.   6. The left atrium is normal in size.   7. The right atrium is normal in size.   8. Mild thickening and calcification of the anterior and posterior mitral valve leaflets.   9. Aortic leaflet calcification. No aortic stenosis.  10. Trivial pericardial effusion.  11. Recommend clinical correlation with the above findings.    Symone Cook MD Electronically signed on 7/4/2020 at 1:15:09 PM    < end of copied text >
Consult for surgeon:  Dr. Chew    Consult called for type B dissection repair seen on CT chest    Patient admitted to Medicine after arriving with Hypoxia, weakness, and fatigue.    HPI:  83 y/o male was brought in by EMS from [facility] living in [place] for generalized weakness and fatigue, O2 sat was reported 70s% RA and EMS=80s%; pt. has h/o asthma.  As per ER physician on presentation pt was not cooperative with history and stated he feels depressed because his "house in [place] got sold." Pt has a h/o depression, anxiety. At the time of admission pt is very co-operative, giving good history but not sure why he was sent to the hospital and wants to go back. Pt. stated that he sometimes feels depressed but reports no si/hi. Pt is aaox3. Pt reports no cp, no cough, no fever, no sob, no abd. pain, no n/v/d. resting in the bed without any distress. pt's o2 sat in the er with 2 L NC is 98 %. (2020 18:20)    Review of systems:  GENERAL:  Fevers[] chills[] sweats[] fatigue[X] weight loss[] weight gain []                                        NEURO:  parathesias[] seizures []  syncope []  confusion [X]                                                                                  EYES: glasses[]  blurry vision[]  discharge[] pain[]                                                                        ENMT:  difficulty hearing []  vertigo[]  dysphagia[] epistaxis[] recent dental work []                                      CV:  chest pain[] palpitations[] DEUTSCH [] diaphoresis [] edema[]                                                                                             RESPIRATORY:  wheezing[X] SOB[] cough [] sputum[] hemoptysis[]                                                                    GI:  nausea[]  vomiting []  diarrhea[] constipation [] melena []                                                                        : hematuria[]  dysuria[]                                                                                   MUSKULOSKELETAL:  arthritis[]  joint swelling []                                                              SKIN/BREAST:  rash[] itching []  hair loss[] masses[]                                                                                                PSYCH:  dementia [] depression [X] anxiety[X]                                                                                                                  HEME/LYMPH:  bruises easily[]      Past Medical/Surgical History:  Idiopathic thrombocytopenia  Hypothyroidism  Asthma  Major depression  Anxiety disorder  S/P cholecystectomy  Aortic dissection: repair    Standing Medications:  albuterol/ipratropium for Nebulization 3 milliLiter(s) Nebulizer every 6 hours  ALPRAZolam 0.25 milliGRAM(s) Oral two times a day  escitalopram 15 milliGRAM(s) Oral daily  furosemide    Tablet 20 milliGRAM(s) Oral daily  lactobacillus acidophilus 1 Tablet(s) Oral three times a day with meals  levoFLOXacin IVPB 500 milliGRAM(s) IV Intermittent every 24 hours  levothyroxine 137 MICROGram(s) Oral daily  metoprolol tartrate 25 milliGRAM(s) Oral two times a day  predniSONE   Tablet 10 milliGRAM(s) Oral daily  QUEtiapine 25 milliGRAM(s) Oral two times a day  risperiDONE   Tablet 0.25 milliGRAM(s) Oral at bedtime    PRN Medications:  melatonin 3 milliGRAM(s) Oral at bedtime PRN Insomnia    Anti-coagulation: None    Allergies: penicillin (Rash)    Social History: No current smoking/alcohol/ or drug use as per patient.     Residence: [facility] in [facility]    Vital Signs:  T(F): 98 (20 @ 18:51)  HR: 80 (20 @ 18:51)  BP: 132/80 (20 @ 18:51)  RR: 18 (20 @ 18:51)  SpO2: 98% (20 @ 18:51)    Physical Exam:  General: Well nourished, well developed, no acute distress.                                                Neuro: Oriented to person, place, and time. Unable to recall full medical history.                    Neck: no JVD noted  Chest: +Scattered wheezing b/l. No accessory muscle use noted.                                                                   CV:  Regular rate and rhythm, S1S2  Carotids: No Bruits appreciated                                                                  GI: soft, NT, ND, normoactive bowel sounds                                                                                             Extremities: No edema of b/l lower extremities   Lower Extremity Pulses: + DP, popliteal, femoral pulses b/l lower extremities. +Strong and equal pulses b/l UEs.   Psych: Mildly anxious nervous during questioning. Confused, doesn't remember why he is in the hospital.                           11.3   11.24 )-----------( 180      ( 2020 15:48 )             34.0     07-    136  |  99  |  16.0  ----------------------------<  102<H>  3.8   |  26.0  |  1.34<H>    Ca    8.7      2020 15:48  Phos  3.3     07-03  Mg     1.7     07-03    TPro  6.9  /  Alb  3.4  /  TBili  0.3<L>  /  DBili  x   /  AST  15  /  ALT  11  /  AlkPhos  111  07-03    PT/INR - ( 2020 15:48 )   PT: 12.7 sec;   INR: 1.10 ratio      PTT - ( 2020 15:48 )  PTT:29.1 sec  Urinalysis Basic - ( 2020 21:38 )    Color: Yellow / Appearance: Clear / S.020 / pH: x  Gluc: x / Ketone: Negative  / Bili: Negative / Urobili: Negative mg/dL   Blood: x / Protein: 15 mg/dL / Nitrite: Negative   Leuk Esterase: Negative / RBC: x / WBC x   Sq Epi: x / Non Sq Epi: x / Bacteria: x    ABG - ( 2020 20:23 )  pH, Arterial: 7.48  pH, Blood: x     /  pCO2: 37    /  pO2: 116   / HCO3: 28    / Base Excess: 4.1   /  SaO2: 99          Imaging  < from: CT Angio Chest w/ IV Cont (20 @ 21:34) >  FINDINGS:   There is good opacification of pulmonary arterial tree, however, there is motion artifact present which degrades image quality andlimits the peripheral vasculature beyond the subsegmental vessels. No central, lobar or segmental filling defect is present to suggest acute pulmonary embolus.  The thyroid gland is unremarkable.   Evaluation of the lung parenchyma demonstrates no suspicious pulmonary nodule or mass. There is no evidence pneumonia. There is mild paraseptal emphysema end focal atelectasis in the right lung base. There is no pneumothorax.  There is no pleural effusion. The tracheobronchial tree is clear.  There is no significant axillary, mediastinal or hilar adenopathy.  The heart is within normal limits of size. The patient is status post repair of type B aortic dissection. The distal descending thoracic aorta at the hiatus measures up to 6.5 cm in transaxial dimension. Limited assessment of the thoracic aorta due to suboptimal opacification.  Limited images of the upper abdomen demonstrate no acute abnormality. Patient status post cholecystectomy.  Degenerative changes in the thoracic spine. There is generalized osteopenia. No suspicious osteoblastic or lytic lesion.  IMPRESSION:  Motion degraded exam. No gross evidence of pulmonary embolism.  < end of copied text >
Internal Medicine Hospitalist Progress Note    follow up hypoxia, weakness   pt is seen in am , he is sitting in the chair   he is crying saying he misses his wife , confused to place   no SOB or chest pain reported     ROS: as above, all remaining ROS are negative.       BACKGROUND:  MEDICATIONS  (STANDING):  albuterol/ipratropium for Nebulization 3 milliLiter(s) Nebulizer every 6 hours  ALPRAZolam 0.25 milliGRAM(s) Oral two times a day  aspirin enteric coated 81 milliGRAM(s) Oral daily  escitalopram 15 milliGRAM(s) Oral daily  furosemide    Tablet 20 milliGRAM(s) Oral daily  heparin   Injectable 5000 Unit(s) SubCutaneous every 12 hours  lactobacillus acidophilus 1 Tablet(s) Oral three times a day with meals  levoFLOXacin IVPB 500 milliGRAM(s) IV Intermittent every 24 hours  levothyroxine 137 MICROGram(s) Oral daily  metoprolol tartrate 25 milliGRAM(s) Oral two times a day  predniSONE   Tablet 10 milliGRAM(s) Oral daily  QUEtiapine 25 milliGRAM(s) Oral two times a day  risperiDONE   Tablet 0.25 milliGRAM(s) Oral at bedtime    MEDICATIONS  (PRN):  melatonin 3 milliGRAM(s) Oral at bedtime PRN Insomnia    Allergies    penicillin (Rash)    Intolerances            VITALS:  Vital Signs Last 24 Hrs  T(C): 36.9 (04 Jul 2020 09:35), Max: 36.9 (03 Jul 2020 18:45)  T(F): 98.4 (04 Jul 2020 09:35), Max: 98.4 (03 Jul 2020 18:45)  HR: 87 (04 Jul 2020 09:35) (70 - 96)  BP: 127/81 (04 Jul 2020 09:35) (121/71 - 144/80)  BP(mean): --  RR: 18 (04 Jul 2020 09:35) (16 - 18)  SpO2: 96% (04 Jul 2020 09:35) (95% - 98%) Daily Height in cm: 180.34 (04 Jul 2020 00:40)    Daily   CAPILLARY BLOOD GLUCOSE        I&O's Summary    03 Jul 2020 07:01  -  04 Jul 2020 07:00  --------------------------------------------------------  IN: 40 mL / OUT: 125 mL / NET: -85 mL        PHYSICAL EXAM:      Constitutional: awake alert , no distress     Neck: supple , no JVD     Respiratory: CTA bilateral    Cardiovascular: regular s1 /s2     Gastrointestinal: soft no tenderness , BS positive     Extremities: + edema , R calf tenderness           LABS:                        11.9   11.21 )-----------( 166      ( 04 Jul 2020 07:10 )             35.9     07-04    136  |  98  |  15.0  ----------------------------<  116<H>  3.3<L>   |  24.0  |  1.23    Ca    8.9      04 Jul 2020 07:10  Phos  3.3     07-03  Mg     1.8     07-04    TPro  6.9  /  Alb  3.4  /  TBili  0.3<L>  /  DBili  x   /  AST  15  /  ALT  11  /  AlkPhos  111  07-03    PT/INR - ( 03 Jul 2020 15:48 )   PT: 12.7 sec;   INR: 1.10 ratio         PTT - ( 03 Jul 2020 15:48 )  PTT:29.1 sec    Radiology :      < from: TTE Echo Complete w/o Contrast w/ Doppler (07.04.20 @ 10:56) >  ummary:   1. Technically difficult study with poor endocardial visualization.   2. Normal global left ventricular systolic function.   3. Left ventricular ejection fraction, by visual estimation, is 55 to 60%.   4. Abnormal septal motion which may be due to intraventricular conduction delay.   5. Normal right ventricle size and systolic function.   6. The left atrium is normal in size.   7. The right atrium is normal in size.   8. Mild thickening and calcification of the anterior and posterior mitral valve leaflets.   9. Aortic leaflet calcification. No aortic stenosis.  10. Trivial pericardial effusion.  11. Recommend clinical correlation with the above findings.    Symone Cook MD Electronically signed on 7/4/2020 at 1:15:09 PM    < end of copied text >
Internal Medicine Hospitalist Progress Note    follow up hypoxia, weakness , he was resting in the bed seen earlier , anxious wants to go home   after my visit , pt had became agitated ,  pulled his IV and asking to speak to his son   refuses to take his medications ativan per nurse         Vital Signs Last 24 Hrs  T(C): 36.7 (05 Jul 2020 09:29), Max: 36.9 (04 Jul 2020 20:30)  T(F): 98.1 (05 Jul 2020 09:29), Max: 98.4 (04 Jul 2020 20:30)  HR: 86 (05 Jul 2020 09:29) (82 - 102)  BP: 133/76 (05 Jul 2020 09:29) (110/74 - 133/76)  BP(mean): --  RR: 18 (05 Jul 2020 09:29) (18 - 18)  SpO2: 94% (05 Jul 2020 09:29) (92% - 98%)        Constitutional: awake alert , no distress     Neck: supple , no JVD     Respiratory: CTA bilateral, no rales no wheezing     Cardiovascular: regular s1 /s2     Gastrointestinal: soft no tenderness , BS positive     Extremities: + edema  bilaterally                             11.9   11.21 )-----------( 166      ( 04 Jul 2020 07:10 )             35.9   07-04    136  |  98  |  15.0  ----------------------------<  116<H>  3.3<L>   |  24.0  |  1.23    Ca    8.9      04 Jul 2020 07:10  Phos  3.3     07-03  Mg     1.8     07-04    TPro  6.9  /  Alb  3.4  /  TBili  0.3<L>  /  DBili  x   /  AST  15  /  ALT  11  /  AlkPhos  111  07-03      < from: TTE Echo Complete w/o Contrast w/ Doppler (07.04.20 @ 10:56) >  ummary:   1. Technically difficult study with poor endocardial visualization.   2. Normal global left ventricular systolic function.   3. Left ventricular ejection fraction, by visual estimation, is 55 to 60%.   4. Abnormal septal motion which may be due to intraventricular conduction delay.   5. Normal right ventricle size and systolic function.   6. The left atrium is normal in size.   7. The right atrium is normal in size.   8. Mild thickening and calcification of the anterior and posterior mitral valve leaflets.   9. Aortic leaflet calcification. No aortic stenosis.  10. Trivial pericardial effusion.  11. Recommend clinical correlation with the above findings.    Symone Cook MD Electronically signed on 7/4/2020 at 1:15:09 PM    < end of copied text >

## 2020-07-07 NOTE — DISCHARGE NOTE PROVIDER - HOSPITAL COURSE
85 y/o male was brought in by EMS from [facility] in [place] for generalized weakness and fatigue, O2 sat was reported 70s% RA and EMS=80s%; pt. has h/o asthma.  As per ER physician on presentation pt was not cooperative with history and stated he feels depressed because his "house in [place] got sold." Pt has a h/o depression, anxiety. At the time of admission pt is very co-operative, giving good history but not sure why he was sent to the hospital and wants to go back. Pt. stated that he sometimes feels depressed but reports no . Pt is aaox3 on admission , sad he misses his wife     Pt reports no cp, no cough, no fever, no sob, no abd. pain, no n/v/d. resting in the bed without any distress. pt's o2 sat in the er with 2 L NC is 98 %, had CTA of chest PE and PNA are ruled out . Pt is with intermittent agitation  psychiatry consulted , pt 's meds adjusted , he needs oxygen at times     Pt had worsening cr iv fludi given         07-07        140  |  105  |  21.0<H>    ----------------------------<  87    3.9   |  23.0  |  1.46<H>        Ca    8.8      07 Jul 2020 06:37    Phos  2.8     07-07    Mg     1.8     07-07        monitor cr closely , encourage oral fluid intake

## 2020-07-07 NOTE — DISCHARGE NOTE PROVIDER - NSDCMRMEDTOKEN_GEN_ALL_CORE_FT
aspirin 81 mg oral delayed release tablet: 1 tab(s) orally once a day  cyanocobalamin 1000 mcg oral tablet: 1 tab(s) orally once a day  ergocalciferol 50,000 intl units (1.25 mg) oral capsule: 1 cap(s) orally once a week  escitalopram 20 mg oral tablet: 1 tab(s) orally once a day  Lasix 20 mg oral tablet: 1 tab(s) orally once a day start in am   Melatonin 10 mg oral capsule: 1 cap(s) orally once a day (at bedtime)  metoprolol tartrate 25 mg oral tablet: 1 tab(s) orally 2 times a day  predniSONE 10 mg oral tablet: 1 tab(s) orally once a day  QUEtiapine 50 mg oral tablet: 1 tab(s) orally once a day (at bedtime)  Synthroid 137 mcg (0.137 mg) oral tablet: 1 tab(s) orally once a day

## 2020-07-07 NOTE — DISCHARGE NOTE PROVIDER - NSDCCPCAREPLAN_GEN_ALL_CORE_FT
PRINCIPAL DISCHARGE DIAGNOSIS  Diagnosis: Hypoxia  Assessment and Plan of Treatment: suspected PNA , given abx therapy, use oxygen as needed      SECONDARY DISCHARGE DIAGNOSES  Diagnosis: Senile dementia with behavioral disturbance  Assessment and Plan of Treatment: cont seroquel    Diagnosis: Renal failure (ARF), acute on chronic  Assessment and Plan of Treatment: encourage oral fluid intake , monitor closely    Diagnosis: Depression  Assessment and Plan of Treatment: cont current meds , follwo up with psychiatrist ONC to follow   continue current care

## 2020-07-08 LAB
CULTURE RESULTS: SIGNIFICANT CHANGE UP
CULTURE RESULTS: SIGNIFICANT CHANGE UP
SPECIMEN SOURCE: SIGNIFICANT CHANGE UP
SPECIMEN SOURCE: SIGNIFICANT CHANGE UP

## 2020-07-08 PROCEDURE — 82803 BLOOD GASES ANY COMBINATION: CPT

## 2020-07-08 PROCEDURE — 87635 SARS-COV-2 COVID-19 AMP PRB: CPT

## 2020-07-08 PROCEDURE — 70450 CT HEAD/BRAIN W/O DYE: CPT

## 2020-07-08 PROCEDURE — 99285 EMERGENCY DEPT VISIT HI MDM: CPT | Mod: 25

## 2020-07-08 PROCEDURE — 85610 PROTHROMBIN TIME: CPT

## 2020-07-08 PROCEDURE — 82306 VITAMIN D 25 HYDROXY: CPT

## 2020-07-08 PROCEDURE — 93005 ELECTROCARDIOGRAM TRACING: CPT

## 2020-07-08 PROCEDURE — 36415 COLL VENOUS BLD VENIPUNCTURE: CPT

## 2020-07-08 PROCEDURE — 36600 WITHDRAWAL OF ARTERIAL BLOOD: CPT

## 2020-07-08 PROCEDURE — 81001 URINALYSIS AUTO W/SCOPE: CPT

## 2020-07-08 PROCEDURE — 86769 SARS-COV-2 COVID-19 ANTIBODY: CPT

## 2020-07-08 PROCEDURE — 83735 ASSAY OF MAGNESIUM: CPT

## 2020-07-08 PROCEDURE — 84480 ASSAY TRIIODOTHYRONINE (T3): CPT

## 2020-07-08 PROCEDURE — 82607 VITAMIN B-12: CPT

## 2020-07-08 PROCEDURE — 93970 EXTREMITY STUDY: CPT

## 2020-07-08 PROCEDURE — 87040 BLOOD CULTURE FOR BACTERIA: CPT

## 2020-07-08 PROCEDURE — 84443 ASSAY THYROID STIM HORMONE: CPT

## 2020-07-08 PROCEDURE — 97530 THERAPEUTIC ACTIVITIES: CPT

## 2020-07-08 PROCEDURE — 80053 COMPREHEN METABOLIC PANEL: CPT

## 2020-07-08 PROCEDURE — 71045 X-RAY EXAM CHEST 1 VIEW: CPT

## 2020-07-08 PROCEDURE — 83880 ASSAY OF NATRIURETIC PEPTIDE: CPT

## 2020-07-08 PROCEDURE — 71275 CT ANGIOGRAPHY CHEST: CPT

## 2020-07-08 PROCEDURE — 97116 GAIT TRAINING THERAPY: CPT

## 2020-07-08 PROCEDURE — 94640 AIRWAY INHALATION TREATMENT: CPT

## 2020-07-08 PROCEDURE — 84100 ASSAY OF PHOSPHORUS: CPT

## 2020-07-08 PROCEDURE — 83605 ASSAY OF LACTIC ACID: CPT

## 2020-07-08 PROCEDURE — 80048 BASIC METABOLIC PNL TOTAL CA: CPT

## 2020-07-08 PROCEDURE — 93306 TTE W/DOPPLER COMPLETE: CPT

## 2020-07-08 PROCEDURE — 85730 THROMBOPLASTIN TIME PARTIAL: CPT

## 2020-07-08 PROCEDURE — 84436 ASSAY OF TOTAL THYROXINE: CPT

## 2020-07-08 PROCEDURE — 80307 DRUG TEST PRSMV CHEM ANLYZR: CPT

## 2020-07-08 PROCEDURE — 87086 URINE CULTURE/COLONY COUNT: CPT

## 2020-07-08 PROCEDURE — 99284 EMERGENCY DEPT VISIT MOD MDM: CPT | Mod: 25

## 2020-07-08 PROCEDURE — 85027 COMPLETE CBC AUTOMATED: CPT

## 2020-07-08 PROCEDURE — 97163 PT EVAL HIGH COMPLEX 45 MIN: CPT

## 2020-07-08 NOTE — CDI QUERY NOTE - NSCDIOTHERTXTBX_GEN_ALL_CORE_HH
This patient is documented with JESSI/acute renal failure.  This patient does not appear to meet [facility]'s policy using KDIGO criteria.    The [employer]’s (KDIGO) definition of JESSI include an increase in SCr of = 0.3mg/dl with 48 hours or   = 1.5 times baseline or urine volume < 0.5ml/kg/h for 6 hours, which the [facility] policy for reporting JESSI is based on.    Please refer to [facility] Policy # C1 “The Reporting of JESSI in Adult/Pediatric Patients” for full detail.      Please document an addendum to the D/C summary your criteria for the diagnosis of JESSI, or document if JESSI was ruled out.  Thank you      Creatinine Trend:  Creatinine, Serum: 1.46 mg/dL <H> [0.50 - 1.30] (07-07-20)  Creatinine, Serum: 1.47 mg/dL <H> [0.50 - 1.30] (07-06-20)  Creatinine, Serum: 1.23 mg/dL [0.50 - 1.30] (07-04-20)  Creatinine, Serum: 1.34 mg/dL <H> [0.50 - 1.30] (07-03-20)      Chart Documentation:  7/6 Hospitalist-    ARF on CRF stage 3 likely due to poor oral intake /  dehydration   will start iv fluid   trend cr   get bladder scan r/o retention     D/C note-   Diagnosis: Renal failure (ARF), acute on chronic  Assessment and Plan of Treatment: encourage oral fluid intake , monitor closely

## 2020-10-09 ENCOUNTER — TRANSCRIPTION ENCOUNTER (OUTPATIENT)
Age: 85
End: 2020-10-09

## 2020-10-09 ENCOUNTER — INPATIENT (INPATIENT)
Facility: HOSPITAL | Age: 85
LOS: 4 days | Discharge: HOME | DRG: 190 | End: 2020-10-14
Attending: HOSPITALIST | Admitting: HOSPITALIST
Payer: MEDICARE

## 2020-10-09 VITALS
HEART RATE: 92 BPM | RESPIRATION RATE: 26 BRPM | HEIGHT: 71 IN | DIASTOLIC BLOOD PRESSURE: 61 MMHG | SYSTOLIC BLOOD PRESSURE: 109 MMHG | TEMPERATURE: 98 F | OXYGEN SATURATION: 100 %

## 2020-10-09 DIAGNOSIS — I71.00 DISSECTION OF UNSPECIFIED SITE OF AORTA: Chronic | ICD-10-CM

## 2020-10-09 DIAGNOSIS — Z90.49 ACQUIRED ABSENCE OF OTHER SPECIFIED PARTS OF DIGESTIVE TRACT: Chronic | ICD-10-CM

## 2020-10-09 DIAGNOSIS — J45.901 UNSPECIFIED ASTHMA WITH (ACUTE) EXACERBATION: ICD-10-CM

## 2020-10-09 LAB
ALBUMIN SERPL ELPH-MCNC: 3.2 G/DL — LOW (ref 3.3–5.2)
ALP SERPL-CCNC: 101 U/L — SIGNIFICANT CHANGE UP (ref 40–120)
ALT FLD-CCNC: 7 U/L — SIGNIFICANT CHANGE UP
ANION GAP SERPL CALC-SCNC: 15 MMOL/L — SIGNIFICANT CHANGE UP (ref 5–17)
APTT BLD: 38.9 SEC — HIGH (ref 27.5–35.5)
AST SERPL-CCNC: 15 U/L — SIGNIFICANT CHANGE UP
BASOPHILS # BLD AUTO: 0.06 K/UL — SIGNIFICANT CHANGE UP (ref 0–0.2)
BASOPHILS NFR BLD AUTO: 0.6 % — SIGNIFICANT CHANGE UP (ref 0–2)
BILIRUB SERPL-MCNC: 0.4 MG/DL — SIGNIFICANT CHANGE UP (ref 0.4–2)
BUN SERPL-MCNC: 15 MG/DL — SIGNIFICANT CHANGE UP (ref 8–20)
CALCIUM SERPL-MCNC: 9 MG/DL — SIGNIFICANT CHANGE UP (ref 8.6–10.2)
CHLORIDE SERPL-SCNC: 97 MMOL/L — LOW (ref 98–107)
CO2 SERPL-SCNC: 23 MMOL/L — SIGNIFICANT CHANGE UP (ref 22–29)
CREAT SERPL-MCNC: 1.86 MG/DL — HIGH (ref 0.5–1.3)
EOSINOPHIL # BLD AUTO: 0.76 K/UL — HIGH (ref 0–0.5)
EOSINOPHIL NFR BLD AUTO: 7.2 % — HIGH (ref 0–6)
GLUCOSE SERPL-MCNC: 94 MG/DL — SIGNIFICANT CHANGE UP (ref 70–99)
HCT VFR BLD CALC: 31.6 % — LOW (ref 39–50)
HGB BLD-MCNC: 10.3 G/DL — LOW (ref 13–17)
IMM GRANULOCYTES NFR BLD AUTO: 1 % — SIGNIFICANT CHANGE UP (ref 0–1.5)
INR BLD: 1.35 RATIO — HIGH (ref 0.88–1.16)
LIDOCAIN IGE QN: 37 U/L — SIGNIFICANT CHANGE UP (ref 22–51)
LYMPHOCYTES # BLD AUTO: 1.92 K/UL — SIGNIFICANT CHANGE UP (ref 1–3.3)
LYMPHOCYTES # BLD AUTO: 18.2 % — SIGNIFICANT CHANGE UP (ref 13–44)
MAGNESIUM SERPL-MCNC: 1.5 MG/DL — LOW (ref 1.6–2.6)
MCHC RBC-ENTMCNC: 29.9 PG — SIGNIFICANT CHANGE UP (ref 27–34)
MCHC RBC-ENTMCNC: 32.6 GM/DL — SIGNIFICANT CHANGE UP (ref 32–36)
MCV RBC AUTO: 91.9 FL — SIGNIFICANT CHANGE UP (ref 80–100)
MONOCYTES # BLD AUTO: 1.42 K/UL — HIGH (ref 0–0.9)
MONOCYTES NFR BLD AUTO: 13.4 % — SIGNIFICANT CHANGE UP (ref 2–14)
NEUTROPHILS # BLD AUTO: 6.3 K/UL — SIGNIFICANT CHANGE UP (ref 1.8–7.4)
NEUTROPHILS NFR BLD AUTO: 59.6 % — SIGNIFICANT CHANGE UP (ref 43–77)
NT-PROBNP SERPL-SCNC: 490 PG/ML — HIGH (ref 0–300)
PLATELET # BLD AUTO: 246 K/UL — SIGNIFICANT CHANGE UP (ref 150–400)
POTASSIUM SERPL-MCNC: 3.7 MMOL/L — SIGNIFICANT CHANGE UP (ref 3.5–5.3)
POTASSIUM SERPL-SCNC: 3.7 MMOL/L — SIGNIFICANT CHANGE UP (ref 3.5–5.3)
PROCALCITONIN SERPL-MCNC: 0.07 NG/ML — SIGNIFICANT CHANGE UP (ref 0.02–0.1)
PROT SERPL-MCNC: 7.1 G/DL — SIGNIFICANT CHANGE UP (ref 6.6–8.7)
PROTHROM AB SERPL-ACNC: 15.4 SEC — HIGH (ref 10.6–13.6)
RBC # BLD: 3.44 M/UL — LOW (ref 4.2–5.8)
RBC # FLD: 16 % — HIGH (ref 10.3–14.5)
SARS-COV-2 IGG SERPL QL IA: NEGATIVE — SIGNIFICANT CHANGE UP
SARS-COV-2 IGM SERPL IA-ACNC: <0.1 INDEX — SIGNIFICANT CHANGE UP
SARS-COV-2 RNA SPEC QL NAA+PROBE: SIGNIFICANT CHANGE UP
SODIUM SERPL-SCNC: 134 MMOL/L — LOW (ref 135–145)
TROPONIN T SERPL-MCNC: <0.01 NG/ML — SIGNIFICANT CHANGE UP (ref 0–0.06)
WBC # BLD: 10.57 K/UL — HIGH (ref 3.8–10.5)
WBC # FLD AUTO: 10.57 K/UL — HIGH (ref 3.8–10.5)

## 2020-10-09 PROCEDURE — 99223 1ST HOSP IP/OBS HIGH 75: CPT

## 2020-10-09 PROCEDURE — 93010 ELECTROCARDIOGRAM REPORT: CPT

## 2020-10-09 PROCEDURE — 99285 EMERGENCY DEPT VISIT HI MDM: CPT | Mod: CS

## 2020-10-09 PROCEDURE — 12345: CPT | Mod: NC

## 2020-10-09 PROCEDURE — 71045 X-RAY EXAM CHEST 1 VIEW: CPT | Mod: 26

## 2020-10-09 RX ORDER — MAGNESIUM SULFATE 500 MG/ML
2 VIAL (ML) INJECTION ONCE
Refills: 0 | Status: COMPLETED | OUTPATIENT
Start: 2020-10-09 | End: 2020-10-09

## 2020-10-09 RX ORDER — ESCITALOPRAM OXALATE 10 MG/1
10 TABLET, FILM COATED ORAL DAILY
Refills: 0 | Status: DISCONTINUED | OUTPATIENT
Start: 2020-10-09 | End: 2020-10-14

## 2020-10-09 RX ORDER — FUROSEMIDE 40 MG
20 TABLET ORAL DAILY
Refills: 0 | Status: DISCONTINUED | OUTPATIENT
Start: 2020-10-09 | End: 2020-10-09

## 2020-10-09 RX ORDER — IPRATROPIUM/ALBUTEROL SULFATE 18-103MCG
3 AEROSOL WITH ADAPTER (GRAM) INHALATION
Refills: 0 | Status: COMPLETED | OUTPATIENT
Start: 2020-10-09 | End: 2020-10-09

## 2020-10-09 RX ORDER — IPRATROPIUM/ALBUTEROL SULFATE 18-103MCG
3 AEROSOL WITH ADAPTER (GRAM) INHALATION EVERY 6 HOURS
Refills: 0 | Status: COMPLETED | OUTPATIENT
Start: 2020-10-09 | End: 2020-10-12

## 2020-10-09 RX ORDER — SODIUM CHLORIDE 9 MG/ML
500 INJECTION INTRAMUSCULAR; INTRAVENOUS; SUBCUTANEOUS ONCE
Refills: 0 | Status: COMPLETED | OUTPATIENT
Start: 2020-10-09 | End: 2020-10-09

## 2020-10-09 RX ORDER — PREGABALIN 225 MG/1
1000 CAPSULE ORAL DAILY
Refills: 0 | Status: DISCONTINUED | OUTPATIENT
Start: 2020-10-09 | End: 2020-10-14

## 2020-10-09 RX ORDER — METOPROLOL TARTRATE 50 MG
25 TABLET ORAL
Refills: 0 | Status: DISCONTINUED | OUTPATIENT
Start: 2020-10-09 | End: 2020-10-14

## 2020-10-09 RX ORDER — BUPROPION HYDROCHLORIDE 150 MG/1
150 TABLET, EXTENDED RELEASE ORAL DAILY
Refills: 0 | Status: DISCONTINUED | OUTPATIENT
Start: 2020-10-09 | End: 2020-10-14

## 2020-10-09 RX ORDER — ASPIRIN/CALCIUM CARB/MAGNESIUM 324 MG
81 TABLET ORAL DAILY
Refills: 0 | Status: DISCONTINUED | OUTPATIENT
Start: 2020-10-09 | End: 2020-10-14

## 2020-10-09 RX ORDER — ENOXAPARIN SODIUM 100 MG/ML
40 INJECTION SUBCUTANEOUS DAILY
Refills: 0 | Status: DISCONTINUED | OUTPATIENT
Start: 2020-10-09 | End: 2020-10-14

## 2020-10-09 RX ORDER — PANTOPRAZOLE SODIUM 20 MG/1
40 TABLET, DELAYED RELEASE ORAL
Refills: 0 | Status: DISCONTINUED | OUTPATIENT
Start: 2020-10-09 | End: 2020-10-14

## 2020-10-09 RX ORDER — LANOLIN ALCOHOL/MO/W.PET/CERES
10 CREAM (GRAM) TOPICAL AT BEDTIME
Refills: 0 | Status: DISCONTINUED | OUTPATIENT
Start: 2020-10-09 | End: 2020-10-14

## 2020-10-09 RX ORDER — LEVOTHYROXINE SODIUM 125 MCG
137 TABLET ORAL DAILY
Refills: 0 | Status: DISCONTINUED | OUTPATIENT
Start: 2020-10-09 | End: 2020-10-14

## 2020-10-09 RX ORDER — QUETIAPINE FUMARATE 200 MG/1
25 TABLET, FILM COATED ORAL
Refills: 0 | Status: DISCONTINUED | OUTPATIENT
Start: 2020-10-09 | End: 2020-10-14

## 2020-10-09 RX ADMIN — Medication 40 MILLIGRAM(S): at 07:24

## 2020-10-09 RX ADMIN — Medication 100 MILLIGRAM(S): at 17:31

## 2020-10-09 RX ADMIN — QUETIAPINE FUMARATE 25 MILLIGRAM(S): 200 TABLET, FILM COATED ORAL at 06:10

## 2020-10-09 RX ADMIN — Medication 137 MICROGRAM(S): at 07:23

## 2020-10-09 RX ADMIN — ESCITALOPRAM OXALATE 10 MILLIGRAM(S): 10 TABLET, FILM COATED ORAL at 11:30

## 2020-10-09 RX ADMIN — Medication 3 MILLILITER(S): at 15:00

## 2020-10-09 RX ADMIN — Medication 25 MILLIGRAM(S): at 06:10

## 2020-10-09 RX ADMIN — Medication 3 MILLILITER(S): at 01:10

## 2020-10-09 RX ADMIN — Medication 10 MILLIGRAM(S): at 21:56

## 2020-10-09 RX ADMIN — Medication 3 MILLILITER(S): at 20:49

## 2020-10-09 RX ADMIN — BUPROPION HYDROCHLORIDE 150 MILLIGRAM(S): 150 TABLET, EXTENDED RELEASE ORAL at 11:29

## 2020-10-09 RX ADMIN — Medication 81 MILLIGRAM(S): at 11:32

## 2020-10-09 RX ADMIN — Medication 3 MILLILITER(S): at 08:11

## 2020-10-09 RX ADMIN — Medication 3 MILLILITER(S): at 01:11

## 2020-10-09 RX ADMIN — ENOXAPARIN SODIUM 40 MILLIGRAM(S): 100 INJECTION SUBCUTANEOUS at 11:30

## 2020-10-09 RX ADMIN — SODIUM CHLORIDE 500 MILLILITER(S): 9 INJECTION INTRAMUSCULAR; INTRAVENOUS; SUBCUTANEOUS at 03:54

## 2020-10-09 RX ADMIN — PREGABALIN 1000 MICROGRAM(S): 225 CAPSULE ORAL at 11:30

## 2020-10-09 RX ADMIN — Medication 125 MILLIGRAM(S): at 01:09

## 2020-10-09 RX ADMIN — Medication 100 MILLIGRAM(S): at 11:29

## 2020-10-09 RX ADMIN — Medication 40 MILLIGRAM(S): at 14:28

## 2020-10-09 RX ADMIN — Medication 25 MILLIGRAM(S): at 17:31

## 2020-10-09 RX ADMIN — Medication 50 GRAM(S): at 02:28

## 2020-10-09 RX ADMIN — Medication 40 MILLIGRAM(S): at 21:55

## 2020-10-09 RX ADMIN — Medication 3 MILLILITER(S): at 01:08

## 2020-10-09 RX ADMIN — QUETIAPINE FUMARATE 25 MILLIGRAM(S): 200 TABLET, FILM COATED ORAL at 17:31

## 2020-10-09 NOTE — H&P ADULT - ASSESSMENT
85yoM hx mild dementia, bipolar disorder, former smoker, COPD, HTN, hypothyroidism sent from [facility] for difficulty breathing after several day hx of worsening dyspnea and productive cough, found to be hypoxia on arrival to ER with O2 sat in 80s with CXR showing infiltrate at left base that was previously seen on prior CXR    Acute hypoxemic respiratory failure due to COPD exacerbation  -Admit to   -Will start IV solu-medrol 40mg TID  -Standing DuoNebs x 72 hr  -Was started on Levaquin at facility on 10/5/20, will continue for 3 days (abx duration of 7 days)   -Per [facility] staff, pt only albuterol, not on maintenance inhaler, may need ICS or LABA at time of discharge    JESSI on ?CKD  -Cr 1.86, previously 1.4 in 7/2020 but prior to that had been WNL  -Received 500cc bolus in ED  -Hold PO Lasix for now  -Trend BMP    Hypomagnesemia   -Mg 1.5, repleted in ED    HTN  -On metoprolol    Chronic leg edema  -No documented hx of CHF per step-son or [facility] staff  -Resume PO Lasix, monitor electrolytes given low Mg on admission    Hypothyroidism  -Synthroid resumed    Bipolar disorder  -Quetiapine resumed    Dementia  -Calm, cooperative, no reported encephalopathy   -On multiple psychotropic agents above    Prophylactic measure  -Lovenox

## 2020-10-09 NOTE — ED PROVIDER NOTE - PROGRESS NOTE DETAILS
Spoke with Dr. Gibbs states he believes that paper in the paperwork that requested psych eval is old, pt has been cooperative and friendly for the past several weeks. States that in a prior occasion when pt was brought to hospital was for aggression towards staff and the [facility] doesn't have psych therefore this paper is old.

## 2020-10-09 NOTE — H&P ADULT - NSICDXPASTMEDICALHX_GEN_ALL_CORE_FT
PAST MEDICAL HISTORY:  Anxiety disorder     Chronic obstructive pulmonary disease (COPD)     Dementia     Former smoker     Hypothyroidism     Idiopathic thrombocytopenia     Major depression

## 2020-10-09 NOTE — ED ADULT TRIAGE NOTE - CHIEF COMPLAINT QUOTE
pt with difficulty breathing all day but gradually worsening. pt reports improvement with combi by EMS. pt denies chest pain.

## 2020-10-09 NOTE — PROGRESS NOTE ADULT - SUBJECTIVE AND OBJECTIVE BOX
Patient: NAOMI ABXTER [id number] 85y Male                           Internal Medicine Hospitalist Progress Note    No complaints.  SOB "Much better".  NO cough / fever / chills.  [place] d/c.     ____________________PHYSICAL EXAM:  GENERAL:  NAD Alert and Oriented x 3   HEENT: NCAT  CARDIOVASCULAR:  S1, S2  LUNGS: coarse BS b/l.  Trace exp wheeing.   ABDOMEN:  soft, (-) tenderness, (-) distension, (+) bowel sounds, (-) guarding, (-) rebound (-) rigidity  EXTREMITIES:  no cyanosis / clubbing / edema.   ____________________     VITALS:  Vital Signs Last 24 Hrs  T(C): 36.6 (09 Oct 2020 15:17), Max: 36.9 (09 Oct 2020 07:36)  T(F): 97.8 (09 Oct 2020 15:17), Max: 98.4 (09 Oct 2020 07:36)  HR: 92 (09 Oct 2020 15:17) (81 - 108)  BP: 128/75 (09 Oct 2020 15:17) (109/61 - 131/70)  BP(mean): --  RR: 20 (09 Oct 2020 15:17) (20 - 26)  SpO2: 92% (09 Oct 2020 15:17) (92% - 100%) Daily Height in cm: 180.34 (09 Oct 2020 00:24)    Daily   CAPILLARY BLOOD GLUCOSE        I&O's Summary        BACKGROUND:  HEALTH ISSUES - PROBLEM Dx:        Allergies    penicillin (Rash; Hives)    Intolerances      PAST MEDICAL & SURGICAL HISTORY:  Former smoker    Dementia    Chronic obstructive pulmonary disease (COPD)    Idiopathic thrombocytopenia    Hypothyroidism    Major depression    Anxiety disorder    S/P cholecystectomy    Aortic dissection  repair          LABS:                        10.3   10.57 )-----------( 246      ( 09 Oct 2020 01:24 )             31.6     10-09    134<L>  |  97<L>  |  15.0  ----------------------------<  94  3.7   |  23.0  |  1.86<H>    Ca    9.0      09 Oct 2020 01:24  Mg     1.5     10-09    TPro  7.1  /  Alb  3.2<L>  /  TBili  0.4  /  DBili  x   /  AST  15  /  ALT  7   /  AlkPhos  101  10-09    PT/INR - ( 09 Oct 2020 01:24 )   PT: 15.4 sec;   INR: 1.35 ratio         PTT - ( 09 Oct 2020 01:24 )  PTT:38.9 sec  LIVER FUNCTIONS - ( 09 Oct 2020 01:24 )  Alb: 3.2 g/dL / Pro: 7.1 g/dL / ALK PHOS: 101 U/L / ALT: 7 U/L / AST: 15 U/L / GGT: x             CARDIAC MARKERS ( 09 Oct 2020 01:24 )  x     / <0.01 ng/mL / x     / x     / x              MEDICATIONS:  MEDICATIONS  (STANDING):  albuterol/ipratropium for Nebulization 3 milliLiter(s) Nebulizer every 6 hours  aspirin enteric coated 81 milliGRAM(s) Oral daily  buPROPion XL . 150 milliGRAM(s) Oral daily  cyanocobalamin 1000 MICROGram(s) Oral daily  enoxaparin Injectable 40 milliGRAM(s) SubCutaneous daily  escitalopram 10 milliGRAM(s) Oral daily  levoFLOXacin  Tablet 500 milliGRAM(s) Oral every 24 hours  levothyroxine 137 MICROGram(s) Oral daily  melatonin 10 milliGRAM(s) Oral at bedtime  methylPREDNISolone sodium succinate Injectable 40 milliGRAM(s) IV Push every 8 hours  metoprolol tartrate 25 milliGRAM(s) Oral two times a day  QUEtiapine 25 milliGRAM(s) Oral two times a day    MEDICATIONS  (PRN):  guaiFENesin   Syrup  (Sugar-Free) 100 milliGRAM(s) Oral every 6 hours PRN Cough

## 2020-10-09 NOTE — ED ADULT NURSE NOTE - OBJECTIVE STATEMENT
Pt AAOX3 with periods of confusion, pt c/o SOB for a few days, as per EMS pt was stating in the low 80s on RA, pt O2 Stat 99 with albuterol treatment in ED, pt states he has a cough and is coughing up clear sputum, pt respirations even and labored, pt abdomen soft, nondistended, and nontender, pt able to move all extremities well, pt denies chest pain, pt denies N/V/D, pt denies fever or chills

## 2020-10-09 NOTE — PATIENT PROFILE ADULT - NSPROGENPREVTRANSF_GEN_A_NUR
no PROBLEM DIAGNOSES  Problem: Solitary pulmonary nodule  Assessment and Plan: Scheduled for flexible bronchoscopy, right video assisted thoracoscopy, lung resection on 03/18/2020. Pre op instructions, famotidine, chlorhexidine gluconate soap given and explained. Pt verbalized understanding.

## 2020-10-09 NOTE — H&P ADULT - NSHPPHYSICALEXAM_GEN_ALL_CORE
Vital Signs Last 24 Hrs  T(C): 36.5 (09 Oct 2020 00:24), Max: 36.5 (09 Oct 2020 00:24)  T(F): 97.7 (09 Oct 2020 00:24), Max: 97.7 (09 Oct 2020 00:24)  HR: 92 (09 Oct 2020 00:24) (92 - 92)  BP: 109/61 (09 Oct 2020 00:24) (109/61 - 109/61)  BP(mean): --  RR: 26 (09 Oct 2020 00:24) (26 - 26)  SpO2: 100% (09 Oct 2020 00:24) (100% - 100%)    GENERAL:  Well-appearing, not in acute distress  EYES:  Clear conjuctiva, extraocular movement intact  ENT: Moist mucous membranes  RESP:  Non-labored breathing pattern, lungs clear to ausculation in anterior fields  CV: Regular rate and rhythm, no murmurs appreciated, no lower extremity edema  GI: Soft, non-tender, non-distended  NEURO: Awake, alert, conversant, upper and lower extremity strength 5/5, light touch sensation grossly intact  PSYCH: Calm, cooperative  SKIN: No rash or lesions, warm and dry Vital Signs Last 24 Hrs  T(C): 36.5 (09 Oct 2020 00:24), Max: 36.5 (09 Oct 2020 00:24)  T(F): 97.7 (09 Oct 2020 00:24), Max: 97.7 (09 Oct 2020 00:24)  HR: 92 (09 Oct 2020 00:24) (92 - 92)  BP: 109/61 (09 Oct 2020 00:24) (109/61 - 109/61)  BP(mean): --  RR: 26 (09 Oct 2020 00:24) (26 - 26)  SpO2: 100% (09 Oct 2020 00:24) (100% - 100%)    GENERAL: Well-appearing elderly male, not in acute respiratory distress  EYES:  Clear conjunctiva, extraocular movement intact  ENT: Moist mucous membranes  RESP:  Non-labored breathing pattern, bilateral wheezing and rhonchi   CV: Regular rate and rhythm, no murmurs appreciated, mild ankle edema  GI: Soft, non-tender, non-distended  NEURO: Awake, alert, conversant, upper and lower extremity strength 5/5, light touch sensation grossly intact, bilateral hand tremor, left hand worse than right hand, cogwheel rigidity of arms   PSYCH: Calm, cooperative  SKIN: No rash or lesions, warm and dry

## 2020-10-09 NOTE — ED PROVIDER NOTE - OBJECTIVE STATEMENT
84 y/o M with PMHx of hypothyroid, depression, HTN, bipolar, asthma, insomnia, recently treated with Levaquin started on 10/06/20 - 7 day course for "URI" as per paperwork, anxiety, presents to the ED c/o SOB for a few days. Pt states sx have been progressively worsening throughout the day today. Per EMS pt had spo2 of 82 RA, but is improving with Combivent. Pt diagnosed and treated for PNA 1 month ago. Pt notes associated productive cough with clear sputum and burning with urination. Denies SI/HI, feeling depressed. Has been using his walker lately. Pt reports falling a couple of days ago without any injury. NKDA. Former smoker 20 years ago. On 10mg daily of prednisone and on Albuterol.  Dr Robin Gibbs: [phone] - "Must receive psychiatric evaluation" as per paperwork 84 y/o M with PMHx of hypothyroid, depression, HTN, bipolar, asthma, insomnia, recently treated with Levaquin started on 10/06/20 - 7 day course for "URI" as per paperwork, anxiety, presents to the ED c/o SOB for a few days. Pt states sx have been progressively worsening throughout the day today. Per EMS pt had spo2 of 82 RA, but is improving with Combivent. Pt diagnosed and treated for PNA 1 month ago. Pt notes associated productive cough with clear sputum and burning with urination. Has been using his walker lately. Pt reports falling a couple of days ago without any injury. NKDA. Former smoker 20 years ago. On 10mg daily of prednisone and on Albuterol. Pt denies fevers/chills, ha, loc, focal neuro deficits, cp/palp, abd pain/n/v/d, SI/HI, feeling depressed, recent travel and sick contacts.   Dr Robin Gibbs: [phone] - "Must receive psychiatric evaluation" as per paperwork.

## 2020-10-09 NOTE — CONSULT NOTE ADULT - ASSESSMENT
-Likely COPD with A/E; no pna seen on cxr  -SOB  -wheeze  -Cough  -Hypoxic resp failure  -Renal insufficiency; worsened since July  -Dementia    RECC:  IV steroids. Abx. Nebs. O2. Follow renal fxn, lytes. DVT prophylaxis. Will need outpt f/u with PFTs.

## 2020-10-09 NOTE — H&P ADULT - HISTORY OF PRESENT ILLNESS
85yoM hx mild dementia, bipolar disorder, former smoker, COPD, HTN, hypothyroidism sent from [facility] assisted [facility] for difficulty breathing.  Pt is AOx2 (not oriented to location) but able to provide some hx. Collateral hx obtained from a staff member at facility and step son, Jw.  Pt reports dyspnea at rest and on exertion for the past few days that acutely worsening yesterday evening prior to coming to hospital.  He also has been having a productive cough for a week.  Per staff member, pt uses albuterol twice daily and during one of his breathing treatments, he was having a coughing fit, appeared ‘very red’ and if he was in respiratory distress, so EMS was called.  Staff member also reports that pt had been started on levofloxacin 500mg on 10/5/2020, however she wasn’t sure of the exact indication or if pt was diagnosed with PNA.  Pt denies any fevers, chills, chest pain, orthopnea.  He reports chronic, intermittent leg swelling at baseline.      Per ED RN, pt was hypoxia in 80s upon arrival.  In ED, pt received 500cc bolus, DuoNeb, Mg and solu-medrol.  CXR on my read shows infiltrate on left base that was seen on prior CXR in 7/2020. 85yoM hx mild dementia, bipolar disorder, former smoker, COPD, HTN, hypothyroidism sent from [facility] assisted living for difficulty breathing.  Pt is AOx2 (not oriented to location) but able to provide some hx. Collateral hx obtained from a staff member at facility and step son, Jw.  Pt reports dyspnea at rest and on exertion for the past few days that acutely worsening yesterday evening prior to coming to hospital.  He also has been having a productive cough for a week.  Per staff member, pt uses albuterol twice daily and during one of his breathing treatments, he was having a coughing fit, appeared ‘very red’ and if he was in respiratory distress, so EMS was called.  Staff member also reports that pt had been started on levofloxacin 500mg on 10/5/2020, however she wasn’t sure of the exact indication or if pt was diagnosed with PNA.  Pt denies any fevers, chills, chest pain, orthopnea.  He reports chronic, intermittent leg swelling at baseline.      Per ED RN, pt was hypoxic in 80s upon arrival and was placed on NC with improvement.  In ED, pt received 500cc bolus, DuoNeb, Mg and solu-medrol.  CXR on my read shows infiltrate on left base that was seen on prior CXR in 7/2020.

## 2020-10-09 NOTE — ED PROVIDER NOTE - CLINICAL SUMMARY MEDICAL DECISION MAKING FREE TEXT BOX
86 y/o M with PMHx of hypothyroid, depression, HTN, bipolar, asthma, insomnia, recently treated with Levaquin started on 10/06/20 - 7 day course for "URI" as per paperwork, anxiety, presents to the ED c/o SOB for a few days. 84 y/o M with PMHx of hypothyroid, depression, HTN, bipolar, asthma, insomnia, recently treated with Levaquin started on 10/06/20 - 7 day course for "URI" as per paperwork, anxiety, presents to the ED c/o SOB for a few days. Pt's wob improved with nebs, steroids and mag but continues to need supp o2, will admit for further management

## 2020-10-09 NOTE — ED ADULT NURSE NOTE - INTERVENTIONS DEFINITIONS
Call bell, personal items and telephone within reach/Reinforce activity limits and safety measures with patient and family/Instruct patient to call for assistance/Physically safe environment: no spills, clutter or unnecessary equipment/[place] to call system/Stretcher in lowest position, wheels locked, appropriate side rails in place/Monitor gait and stability

## 2020-10-09 NOTE — CONSULT NOTE ADULT - SUBJECTIVE AND OBJECTIVE BOX
Results for orders placed or performed in visit on 03/04/20   AMB POC GRACIE INFLUENZA A/B TEST   Result Value Ref Range    VALID INTERNAL CONTROL POC Yes     Influenza A Ag POC Negative Negative Pos/Neg    Influenza B Ag POC Positive Negative Pos/Neg PULMONARY CONSULT NOTE      NAOMI [id number]    Patient is a 85y old  Male who presents with a chief complaint of acute hypoxemic respiratory failure, COPD exacerbation (09 Oct 2020 05:35)      HISTORY OF PRESENT ILLNESS: 85yoM hx mild dementia, bipolar disorder, former smoker, COPD, HTN, hypothyroidism sent from [facility] assisted [facility] for difficulty breathing.  He has not been to see us. He says he has not been to any pulmonary doctor. Pt is AOx2 (not oriented to location) but able to provide some hx. Collateral hx obtained from chart.  Pt reports dyspnea at rest and on exertion for the past few days that acutely worsening yesterday evening prior to coming to hospital.  He also has been having a productive cough for a week.  Per staff member, pt uses albuterol twice daily and during one of his breathing treatments, he was having a coughing fit, appeared ‘very red’ and if he was in respiratory distress, so EMS was called.  Staff member also reports that pt had been started on levofloxacin 500mg on 10/5/2020, however she wasn’t sure of the exact indication or if pt was diagnosed with PNA.  Pt denies any fevers, chills, chest pain, orthopnea.  He reports chronic, intermittent leg swelling at baseline.      Per ED RN, pt was hypoxic in 80s upon arrival and was placed on NC with improvement.  In ED, pt received 500cc bolus, DuoNeb, Mg and solu-medrol.       He says he feels much better.       MEDICATIONS  (STANDING):  albuterol/ipratropium for Nebulization 3 milliLiter(s) Nebulizer every 6 hours  aspirin enteric coated 81 milliGRAM(s) Oral daily  buPROPion XL . 150 milliGRAM(s) Oral daily  cyanocobalamin 1000 MICROGram(s) Oral daily  enoxaparin Injectable 40 milliGRAM(s) SubCutaneous daily  escitalopram 10 milliGRAM(s) Oral daily  levoFLOXacin  Tablet 500 milliGRAM(s) Oral every 24 hours  levothyroxine 137 MICROGram(s) Oral daily  melatonin 10 milliGRAM(s) Oral at bedtime  methylPREDNISolone sodium succinate Injectable 40 milliGRAM(s) IV Push every 8 hours  metoprolol tartrate 25 milliGRAM(s) Oral two times a day  QUEtiapine 25 milliGRAM(s) Oral two times a day      MEDICATIONS  (PRN):  guaiFENesin   Syrup  (Sugar-Free) 100 milliGRAM(s) Oral every 6 hours PRN Cough      Allergies    penicillin (Rash; Hives)    Intolerances        PAST MEDICAL & SURGICAL HISTORY:  Former smoker    Dementia    Chronic obstructive pulmonary disease (COPD)    Idiopathic thrombocytopenia    Hypothyroidism    Major depression    Anxiety disorder    S/P cholecystectomy    Aortic dissection  repair        FAMILY HISTORY:  No pertinent family history in first degree relatives        SOCIAL HISTORY  Smoking History: former smoker    REVIEW OF SYSTEMS:    CONSTITUTIONAL:  No fevers, chills, sweats    HEENT:  Eyes:  No diplopia or blurred vision. ENT:  No earache, sore throat or runny nose.    CARDIOVASCULAR:  No pressure, squeezing, tightness, or heaviness about the chest; no palpitations.    RESPIRATORY: per HPI      GASTROINTESTINAL:  No abdominal pain, nausea, vomiting or diarrhea.    GENITOURINARY:  No dysuria, frequency or urgency.    NEUROLOGIC:  No paresthesias, fasciculations, seizures or weakness.    PSYCHIATRIC:  No disorder of thought or mood.    Vital Signs Last 24 Hrs  T(C): 36.6 (09 Oct 2020 15:17), Max: 36.9 (09 Oct 2020 07:36)  T(F): 97.8 (09 Oct 2020 15:17), Max: 98.4 (09 Oct 2020 07:36)  HR: 92 (09 Oct 2020 15:17) (81 - 108)  BP: 128/75 (09 Oct 2020 15:17) (109/61 - 131/70)  BP(mean): --  RR: 20 (09 Oct 2020 15:17) (20 - 26)  SpO2: 92% (09 Oct 2020 15:17) (92% - 100%)    PHYSICAL EXAMINATION:    GENERAL: The patient is  n no apparent distress.     HEENT: Head is normocephalic and atraumatic.   Mucous membranes are moist.     NECK: Supple.     LUNGS: diffuse b/l wheeze, rhonchi, respirations unlabored    HEART: Regular rate and rhythm      ABDOMEN: Soft, nontender, and nondistended.       EXTREMITIES: Without any cyanosis, clubbing, rash, lesions or edema.    NEUROLOGIC: Grossly intact.      LABS:                        10.3   10.57 )-----------( 246      ( 09 Oct 2020 01:24 )             31.6     10-09    134<L>  |  97<L>  |  15.0  ----------------------------<  94  3.7   |  23.0  |  1.86<H>    Ca    9.0      09 Oct 2020 01:24  Mg     1.5     10-09    TPro  7.1  /  Alb  3.2<L>  /  TBili  0.4  /  DBili  x   /  AST  15  /  ALT  7   /  AlkPhos  101  10-09    PT/INR - ( 09 Oct 2020 01:24 )   PT: 15.4 sec;   INR: 1.35 ratio         PTT - ( 09 Oct 2020 01:24 )  PTT:38.9 sec      CARDIAC MARKERS ( 09 Oct 2020 01:24 )  x     / <0.01 ng/mL / x     / x     / x            Serum Pro-Brain Natriuretic Peptide: 490 pg/mL (10-09-20 @ 01:24)      Procalcitonin, Serum: 0.07 ng/mL (10-09-20 @ 09:49)      MICROBIOLOGY:  COVID-19 PCR: NotDetec: Testing is performed using polymerase chain reaction (PCR) or  transcription mediated amplification (TMA). This COVID-19 (SARS-CoV-2)  nucleic acid amplification test was validated by [employer] and is  in use under the FDA Emergency Use Authorization (EUA) for clinical labs  CLIA-certified to perform high complexity testing. Test results should be  correlated with clinical presentation, patient history, and epidemiology. (10.09.20 @ 01:23)        RADIOLOGY & ADDITIONAL STUDIES:  < from: Xray Chest 1 View-PORTABLE IMMEDIATE (10.09.20 @ 03:37) >     EXAM:  XR CHEST PORTABLE IMMED 1V                          PROCEDURE DATE:  10/09/2020          INTERPRETATION:  EXAM:XR CHEST IMMEDIATE.     CLINICAL INDICATION: sob .     TECHNIQUE: AP view.     PRIOR EXAM: 07/03/2020.     FINDINGS:     No pulmonary consolidation or any sizable pleural effusion. Crowded bibasilar lung markings. Stable magnified cardiac and mediastinal silhouette. Evidence of prior descending aortic stent graft. Unremarkable visualized osseous structures.      IMPRESSION:    No evidence of any acute lung disease.                RIGO TIRADO M.D., ATTENDING RADIOLOGIST  This document has been electronically signed. Oct  9 2020  9:32AM    < end of copied text >

## 2020-10-09 NOTE — PATIENT PROFILE ADULT - FALLEN IN THE PAST
no
inability to maintain weight-bearing restrictions w/o assist/decreased weight-shifting ability/decreased balance during turns/decreased step length

## 2020-10-09 NOTE — PROGRESS NOTE ADULT - ASSESSMENT
85yoM hx mild dementia, bipolar disorder, former smoker, COPD, HTN, hypothyroidism sent from [facility] for difficulty breathing after several day hx of worsening dyspnea and productive cough, found to be hypoxia on arrival to ER with O2 sat in 80s.    Acute hypoxemic respiratory failure due to COPD exacerbation  -  - of note, pt also hypoxic 7/2020.  CTA chest at that time negative for PE.   -Will start IV solu-medrol 40mg TID  -Standing DuoNebs x 72 hr  -Was started on Levaquin at facility on 10/5/20, will continue for 3 days (abx duration of 7 days)   -Per [facility] staff, pt only albuterol, not on maintenance inhaler, may need ICS or LABA at time of discharge  -pulmonary evaluation requested. May need home O2 if levels remain low.  LE dopplers.     JESSI on ?CKD  -Cr 1.86, previously 1.4 in 7/2020 but prior to that had been WNL  -Received 500cc bolus in ED  -Hold PO Lasix for now  -Trend BMP    Hypomagnesemia   -Mg 1.5, repleted in ED    HTN  -On metoprolol    Chronic leg edema  -No documented hx of CHF per step-son or [facility] staff  -Resume PO Lasix, monitor electrolytes given low Mg on admission    Hypothyroidism  -Synthroid resumed    Bipolar disorder  -Quetiapine resumed    Dementia  -Calm, cooperative, no reported encephalopathy   -On multiple psychotropic agents above    Prophylactic measure  -Lovenox

## 2020-10-09 NOTE — ED ADULT NURSE NOTE - CHPI ED NUR SYMPTOMS NEG
no headache/no edema/no fever/no chest pain/no diaphoresis/no shortness of breath/no hemoptysis/no body aches/no wheezing

## 2020-10-09 NOTE — H&P ADULT - NSHPLABSRESULTS_GEN_ALL_CORE
10.3   10.57 )-----------( 246      ( 09 Oct 2020 01:24 )             31.6       10-09    134<L>  |  97<L>  |  15.0  ----------------------------<  94  3.7   |  23.0  |  1.86<H>    Ca    9.0      09 Oct 2020 01:24  Mg     1.5     10-09    TPro  7.1  /  Alb  3.2<L>  /  TBili  0.4  /  DBili  x   /  AST  15  /  ALT  7   /  AlkPhos  101  10-09

## 2020-10-09 NOTE — ED PROVIDER NOTE - CROS ED RESP ALL NEG
Follow-up with your pediatrician in 3-5 days if symptoms not improved for further evaluation and management.  Return with any new or worsening symptoms or any concerns.  Use nightly cool mist humidification.  If secretions become a problem in the evening he can use some Benadryl as prescribed to decrease secretions.  
- - -

## 2020-10-10 LAB
ANION GAP SERPL CALC-SCNC: 14 MMOL/L — SIGNIFICANT CHANGE UP (ref 5–17)
BASOPHILS # BLD AUTO: 0.01 K/UL — SIGNIFICANT CHANGE UP (ref 0–0.2)
BASOPHILS NFR BLD AUTO: 0.1 % — SIGNIFICANT CHANGE UP (ref 0–2)
BUN SERPL-MCNC: 23 MG/DL — HIGH (ref 8–20)
CALCIUM SERPL-MCNC: 8.8 MG/DL — SIGNIFICANT CHANGE UP (ref 8.6–10.2)
CHLORIDE SERPL-SCNC: 98 MMOL/L — SIGNIFICANT CHANGE UP (ref 98–107)
CO2 SERPL-SCNC: 22 MMOL/L — SIGNIFICANT CHANGE UP (ref 22–29)
CREAT SERPL-MCNC: 1.73 MG/DL — HIGH (ref 0.5–1.3)
EOSINOPHIL # BLD AUTO: 0 K/UL — SIGNIFICANT CHANGE UP (ref 0–0.5)
EOSINOPHIL NFR BLD AUTO: 0 % — SIGNIFICANT CHANGE UP (ref 0–6)
GLUCOSE SERPL-MCNC: 185 MG/DL — HIGH (ref 70–99)
HCT VFR BLD CALC: 29.8 % — LOW (ref 39–50)
HGB BLD-MCNC: 9.6 G/DL — LOW (ref 13–17)
IMM GRANULOCYTES NFR BLD AUTO: 0.7 % — SIGNIFICANT CHANGE UP (ref 0–1.5)
LYMPHOCYTES # BLD AUTO: 0.67 K/UL — LOW (ref 1–3.3)
LYMPHOCYTES # BLD AUTO: 6.3 % — LOW (ref 13–44)
MCHC RBC-ENTMCNC: 29.5 PG — SIGNIFICANT CHANGE UP (ref 27–34)
MCHC RBC-ENTMCNC: 32.2 GM/DL — SIGNIFICANT CHANGE UP (ref 32–36)
MCV RBC AUTO: 91.7 FL — SIGNIFICANT CHANGE UP (ref 80–100)
MONOCYTES # BLD AUTO: 0.43 K/UL — SIGNIFICANT CHANGE UP (ref 0–0.9)
MONOCYTES NFR BLD AUTO: 4.1 % — SIGNIFICANT CHANGE UP (ref 2–14)
NEUTROPHILS # BLD AUTO: 9.43 K/UL — HIGH (ref 1.8–7.4)
NEUTROPHILS NFR BLD AUTO: 88.8 % — HIGH (ref 43–77)
PLATELET # BLD AUTO: 212 K/UL — SIGNIFICANT CHANGE UP (ref 150–400)
POTASSIUM SERPL-MCNC: 4.2 MMOL/L — SIGNIFICANT CHANGE UP (ref 3.5–5.3)
POTASSIUM SERPL-SCNC: 4.2 MMOL/L — SIGNIFICANT CHANGE UP (ref 3.5–5.3)
RBC # BLD: 3.25 M/UL — LOW (ref 4.2–5.8)
RBC # FLD: 16 % — HIGH (ref 10.3–14.5)
SODIUM SERPL-SCNC: 134 MMOL/L — LOW (ref 135–145)
WBC # BLD: 10.61 K/UL — HIGH (ref 3.8–10.5)
WBC # FLD AUTO: 10.61 K/UL — HIGH (ref 3.8–10.5)

## 2020-10-10 PROCEDURE — 99233 SBSQ HOSP IP/OBS HIGH 50: CPT

## 2020-10-10 RX ADMIN — Medication 137 MICROGRAM(S): at 05:31

## 2020-10-10 RX ADMIN — Medication 100 MILLIGRAM(S): at 10:09

## 2020-10-10 RX ADMIN — Medication 3 MILLILITER(S): at 08:50

## 2020-10-10 RX ADMIN — Medication 0.5 MILLIGRAM(S): at 14:12

## 2020-10-10 RX ADMIN — Medication 25 MILLIGRAM(S): at 05:31

## 2020-10-10 RX ADMIN — Medication 81 MILLIGRAM(S): at 08:16

## 2020-10-10 RX ADMIN — Medication 3 MILLILITER(S): at 21:08

## 2020-10-10 RX ADMIN — PANTOPRAZOLE SODIUM 40 MILLIGRAM(S): 20 TABLET, DELAYED RELEASE ORAL at 08:17

## 2020-10-10 RX ADMIN — Medication 40 MILLIGRAM(S): at 05:31

## 2020-10-10 RX ADMIN — ESCITALOPRAM OXALATE 10 MILLIGRAM(S): 10 TABLET, FILM COATED ORAL at 08:17

## 2020-10-10 RX ADMIN — Medication 25 MILLIGRAM(S): at 18:33

## 2020-10-10 RX ADMIN — QUETIAPINE FUMARATE 25 MILLIGRAM(S): 200 TABLET, FILM COATED ORAL at 05:31

## 2020-10-10 RX ADMIN — ENOXAPARIN SODIUM 40 MILLIGRAM(S): 100 INJECTION SUBCUTANEOUS at 08:17

## 2020-10-10 RX ADMIN — Medication 0.5 MILLIGRAM(S): at 23:36

## 2020-10-10 RX ADMIN — Medication 10 MILLIGRAM(S): at 22:12

## 2020-10-10 RX ADMIN — Medication 40 MILLIGRAM(S): at 18:33

## 2020-10-10 RX ADMIN — Medication 100 MILLIGRAM(S): at 04:19

## 2020-10-10 RX ADMIN — BUPROPION HYDROCHLORIDE 150 MILLIGRAM(S): 150 TABLET, EXTENDED RELEASE ORAL at 08:16

## 2020-10-10 RX ADMIN — Medication 3 MILLILITER(S): at 04:18

## 2020-10-10 RX ADMIN — QUETIAPINE FUMARATE 25 MILLIGRAM(S): 200 TABLET, FILM COATED ORAL at 18:58

## 2020-10-10 RX ADMIN — PREGABALIN 1000 MICROGRAM(S): 225 CAPSULE ORAL at 08:16

## 2020-10-10 RX ADMIN — Medication 3 MILLILITER(S): at 13:58

## 2020-10-10 NOTE — PROGRESS NOTE ADULT - SUBJECTIVE AND OBJECTIVE BOX
PULMONARY PROGRESS NOTE      NAOMI BAXTERCYNTHIA-[id number]    Patient is a 85y old  Male who presents with a chief complaint of acute hypoxemic respiratory failure, COPD exacerbation (10 Oct 2020 12:52)      INTERVAL HPI/OVERNIGHT EVENTS: Says he is feeling much better. Still with cough.     MEDICATIONS  (STANDING):  albuterol/ipratropium for Nebulization 3 milliLiter(s) Nebulizer every 6 hours  aspirin enteric coated 81 milliGRAM(s) Oral daily  buPROPion XL . 150 milliGRAM(s) Oral daily  cyanocobalamin 1000 MICROGram(s) Oral daily  enoxaparin Injectable 40 milliGRAM(s) SubCutaneous daily  escitalopram 10 milliGRAM(s) Oral daily  levothyroxine 137 MICROGram(s) Oral daily  melatonin 10 milliGRAM(s) Oral at bedtime  methylPREDNISolone sodium succinate Injectable 40 milliGRAM(s) IV Push every 8 hours  metoprolol tartrate 25 milliGRAM(s) Oral two times a day  pantoprazole    Tablet 40 milliGRAM(s) Oral before breakfast  QUEtiapine 25 milliGRAM(s) Oral two times a day      MEDICATIONS  (PRN):  LORazepam     Tablet 0.5 milliGRAM(s) Oral two times a day PRN Anxiety      Allergies    penicillin (Rash; Hives)    Intolerances        PAST MEDICAL & SURGICAL HISTORY:  Former smoker    Dementia    Chronic obstructive pulmonary disease (COPD)    Idiopathic thrombocytopenia    Hypothyroidism    Major depression    Anxiety disorder    S/P cholecystectomy    Aortic dissection  repair        SOCIAL HISTORY  Smoking History: current      REVIEW OF SYSTEMS:    CONSTITUTIONAL:  No distress    HEENT:  Eyes:  No diplopia or blurred vision. ENT:  No earache, sore throat or runny nose.    CARDIOVASCULAR:  No pressure, squeezing, tightness, heaviness or aching about the chest; no palpitations.    RESPIRATORY:  per HPI     GASTROINTESTINAL:  No nausea, vomiting or diarrhea.    GENITOURINARY:  No dysuria, frequency or urgency.    NEUROLOGIC:  No paresthesias, fasciculations, seizures or weakness.    PSYCHIATRIC:  No disorder of thought or mood.    Vital Signs Last 24 Hrs  T(C): 36.7 (10 Oct 2020 11:29), Max: 36.7 (10 Oct 2020 11:29)  T(F): 98.1 (10 Oct 2020 11:29), Max: 98.1 (10 Oct 2020 11:29)  HR: 100 (10 Oct 2020 11:29) (89 - 100)  BP: 113/66 (10 Oct 2020 11:29) (112/64 - 128/75)  BP(mean): --  RR: 19 (10 Oct 2020 11:29) (19 - 20)  SpO2: 95% (10 Oct 2020 11:29) (92% - 97%)    PHYSICAL EXAMINATION:    GENERAL: The patient is awake and alert in no apparent distress.     HEENT: Head is normocephalic and atraumatic.   Mucous membranes are moist.    NECK: Supple.    LUNGS: scattered wheeze, respirations unlabored    HEART: Regular rate and rhythm      ABDOMEN: Soft, nontender, and nondistended.      EXTREMITIES: Without any cyanosis, clubbing, rash, lesions or edema.    NEUROLOGIC: Grossly intact.    LABS:                        9.6    10.61 )-----------( 212      ( 10 Oct 2020 03:17 )             29.8     10-10    134<L>  |  98  |  23.0<H>  ----------------------------<  185<H>  4.2   |  22.0  |  1.73<H>    Ca    8.8      10 Oct 2020 03:17  Mg     1.5     10-09    TPro  7.1  /  Alb  3.2<L>  /  TBili  0.4  /  DBili  x   /  AST  15  /  ALT  7   /  AlkPhos  101  10-09    PT/INR - ( 09 Oct 2020 01:24 )   PT: 15.4 sec;   INR: 1.35 ratio         PTT - ( 09 Oct 2020 01:24 )  PTT:38.9 sec      CARDIAC MARKERS ( 09 Oct 2020 01:24 )  x     / <0.01 ng/mL / x     / x     / x            Serum Pro-Brain Natriuretic Peptide: 490 pg/mL (10-09-20 @ 01:24)      Procalcitonin, Serum: 0.07 ng/mL (10-09-20 @ 09:49)           RADIOLOGY & ADDITIONAL STUDIES:  < from: Xray Chest 1 View-PORTABLE IMMEDIATE (10.09.20 @ 03:37) >   EXAM:  XR CHEST PORTABLE IMMED 1V                          PROCEDURE DATE:  10/09/2020          INTERPRETATION:  EXAM:XR CHEST IMMEDIATE.     CLINICAL INDICATION: sob .     TECHNIQUE: AP view.     PRIOR EXAM: 07/03/2020.     FINDINGS:     No pulmonary consolidation or any sizable pleural effusion. Crowded bibasilar lung markings. Stable magnified cardiac and mediastinal silhouette. Evidence of prior descending aortic stent graft. Unremarkable visualized osseous structures.      IMPRESSION:    No evidence of any acute lung disease.                RIGO TIRADO M.D., ATTENDING RADIOLOGIST  This document has been electronically signed. Oct  9 2020  9:32AM    < end of copied text >

## 2020-10-10 NOTE — PROGRESS NOTE ADULT - ASSESSMENT
-Likely COPD with A/E; no pna seen on cxr  -SOB  -wheeze  -Cough  -Hypoxic resp failure  -Renal insufficiency; worsened since July  -Dementia    RECC:  IV steroids with taper. Abx. Nebs. O2. Follow renal fxn, lytes. DVT prophylaxis. Will need outpt f/u with PFTs. Info given.

## 2020-10-10 NOTE — PROGRESS NOTE ADULT - SUBJECTIVE AND OBJECTIVE BOX
NAOMI BAXTER  ----------------------------------------  The patient was seen and evaluated for COPD exacerbation. Reports some dyspnea. Very anxious about returning home as soon as possible.    Vital Signs Last 24 Hrs  T(C): 36.7 (10 Oct 2020 11:29), Max: 36.7 (10 Oct 2020 11:29)  T(F): 98.1 (10 Oct 2020 11:29), Max: 98.1 (10 Oct 2020 11:29)  HR: 100 (10 Oct 2020 11:29) (89 - 100)  BP: 113/66 (10 Oct 2020 11:29) (112/64 - 128/75)  BP(mean): --  RR: 19 (10 Oct 2020 11:29) (19 - 20)  SpO2: 95% (10 Oct 2020 11:29) (92% - 97%)    PHYSICAL EXAMINATION:  ----------------------------------------  General appearance: No acute distress, Awake, Alert  HEENT: Normocephalic, Atraumatic, Conjunctiva clear, EOMI  Neck: Supple, No JVD, No tenderness  Lungs: Breath sound equal bilaterally, No rales, Mild expiratory wheezing  Cardiovascular: S1S2, Regular rhythm  Abdomen: Soft, Nontender, Nondistended, No guarding/rebound, Positive bowel sounds  Extremities: No clubbing, No cyanosis, No edema, No calf tenderness  Neuro: Strength equal bilaterally, No tremors  Psychiatric: Appropriate mood, Normal affect    LABORATORY STUDIES:  ----------------------------------------             9.6    10.61 )-----------( 212      ( 10 Oct 2020 03:17 )             29.8     10-10    134<L>  |  98  |  23.0<H>  ----------------------------<  185<H>  4.2   |  22.0  |  1.73<H>    Ca    8.8      10 Oct 2020 03:17  Mg     1.5     10-09    TPro  7.1  /  Alb  3.2<L>  /  TBili  0.4  /  DBili  x   /  AST  15  /  ALT  7   /  AlkPhos  101  10-09    LIVER FUNCTIONS - ( 09 Oct 2020 01:24 )  Alb: 3.2 g/dL / Pro: 7.1 g/dL / ALK PHOS: 101 U/L / ALT: 7 U/L / AST: 15 U/L / GGT: x           PT/INR - ( 09 Oct 2020 01:24 )   PT: 15.4 sec;   INR: 1.35 ratio    PTT - ( 09 Oct 2020 01:24 )  PTT:38.9 sec    CARDIAC MARKERS ( 09 Oct 2020 01:24 )  x     / <0.01 ng/mL / x     / x     / x        MEDICATIONS  (STANDING):  albuterol/ipratropium for Nebulization 3 milliLiter(s) Nebulizer every 6 hours  aspirin enteric coated 81 milliGRAM(s) Oral daily  buPROPion XL . 150 milliGRAM(s) Oral daily  cyanocobalamin 1000 MICROGram(s) Oral daily  enoxaparin Injectable 40 milliGRAM(s) SubCutaneous daily  escitalopram 10 milliGRAM(s) Oral daily  levothyroxine 137 MICROGram(s) Oral daily  melatonin 10 milliGRAM(s) Oral at bedtime  methylPREDNISolone sodium succinate Injectable 40 milliGRAM(s) IV Push every 8 hours  metoprolol tartrate 25 milliGRAM(s) Oral two times a day  pantoprazole    Tablet 40 milliGRAM(s) Oral before breakfast  QUEtiapine 25 milliGRAM(s) Oral two times a day    MEDICATIONS  (PRN):  LORazepam     Tablet 0.5 milliGRAM(s) Oral two times a day PRN Anxiety      ASSESSMENT / PLAN:  ----------------------------------------  85M with a history of COPD, bipolar disorder, dementia, anxiety/depression, hypothyroidism, hypertension, and thrombocytopenia who was referred to the hospital from the assisted living facility for dyspnea and productive cough despite treatment with levofloxacin.    Acute hypoxic respiratory failure / COPD exacerbation - On intravenous methylprednisolone and inhaled bronchodilator therapy. Supplemental oxygen as needed with titration as tolerated. Chest Xray was reviewed and without acute pathology. Pulmonary consultation noted.    Chronic kidney disease III with acute kidney injury - Furosemide held on admission. Received intravenous fluids in the emergency department. Repeat laboratory studies ordered to monitor.    Hypothyroidism - On levothyroxine.    Bipolar disorder / Anxiety - On escitalopram, bupropion, and quetiapine. Noted to have been on lorazepam previously, to reinitiate.    Hypertension - Close blood pressure monitoring. On metoprolol.    Hypomagnesemia - Magnesium supplemented.

## 2020-10-11 LAB
ANION GAP SERPL CALC-SCNC: 11 MMOL/L — SIGNIFICANT CHANGE UP (ref 5–17)
BUN SERPL-MCNC: 27 MG/DL — HIGH (ref 8–20)
CALCIUM SERPL-MCNC: 8.8 MG/DL — SIGNIFICANT CHANGE UP (ref 8.6–10.2)
CHLORIDE SERPL-SCNC: 99 MMOL/L — SIGNIFICANT CHANGE UP (ref 98–107)
CO2 SERPL-SCNC: 26 MMOL/L — SIGNIFICANT CHANGE UP (ref 22–29)
CREAT SERPL-MCNC: 1.49 MG/DL — HIGH (ref 0.5–1.3)
GLUCOSE SERPL-MCNC: 154 MG/DL — HIGH (ref 70–99)
HCT VFR BLD CALC: 30.4 % — LOW (ref 39–50)
HGB BLD-MCNC: 9.7 G/DL — LOW (ref 13–17)
MAGNESIUM SERPL-MCNC: 2.3 MG/DL — SIGNIFICANT CHANGE UP (ref 1.6–2.6)
MCHC RBC-ENTMCNC: 29.7 PG — SIGNIFICANT CHANGE UP (ref 27–34)
MCHC RBC-ENTMCNC: 31.9 GM/DL — LOW (ref 32–36)
MCV RBC AUTO: 93 FL — SIGNIFICANT CHANGE UP (ref 80–100)
PLATELET # BLD AUTO: 239 K/UL — SIGNIFICANT CHANGE UP (ref 150–400)
POTASSIUM SERPL-MCNC: 4.5 MMOL/L — SIGNIFICANT CHANGE UP (ref 3.5–5.3)
POTASSIUM SERPL-SCNC: 4.5 MMOL/L — SIGNIFICANT CHANGE UP (ref 3.5–5.3)
RBC # BLD: 3.27 M/UL — LOW (ref 4.2–5.8)
RBC # FLD: 16.5 % — HIGH (ref 10.3–14.5)
SODIUM SERPL-SCNC: 136 MMOL/L — SIGNIFICANT CHANGE UP (ref 135–145)
WBC # BLD: 14.54 K/UL — HIGH (ref 3.8–10.5)
WBC # FLD AUTO: 14.54 K/UL — HIGH (ref 3.8–10.5)

## 2020-10-11 PROCEDURE — 99233 SBSQ HOSP IP/OBS HIGH 50: CPT

## 2020-10-11 PROCEDURE — 99232 SBSQ HOSP IP/OBS MODERATE 35: CPT

## 2020-10-11 RX ADMIN — QUETIAPINE FUMARATE 25 MILLIGRAM(S): 200 TABLET, FILM COATED ORAL at 05:10

## 2020-10-11 RX ADMIN — Medication 3 MILLILITER(S): at 14:43

## 2020-10-11 RX ADMIN — PANTOPRAZOLE SODIUM 40 MILLIGRAM(S): 20 TABLET, DELAYED RELEASE ORAL at 05:10

## 2020-10-11 RX ADMIN — ESCITALOPRAM OXALATE 10 MILLIGRAM(S): 10 TABLET, FILM COATED ORAL at 11:08

## 2020-10-11 RX ADMIN — PREGABALIN 1000 MICROGRAM(S): 225 CAPSULE ORAL at 11:08

## 2020-10-11 RX ADMIN — BUPROPION HYDROCHLORIDE 150 MILLIGRAM(S): 150 TABLET, EXTENDED RELEASE ORAL at 11:07

## 2020-10-11 RX ADMIN — Medication 40 MILLIGRAM(S): at 17:07

## 2020-10-11 RX ADMIN — Medication 137 MICROGRAM(S): at 05:10

## 2020-10-11 RX ADMIN — Medication 3 MILLILITER(S): at 03:58

## 2020-10-11 RX ADMIN — Medication 10 MILLIGRAM(S): at 21:03

## 2020-10-11 RX ADMIN — Medication 40 MILLIGRAM(S): at 05:09

## 2020-10-11 RX ADMIN — Medication 3 MILLILITER(S): at 08:46

## 2020-10-11 RX ADMIN — QUETIAPINE FUMARATE 25 MILLIGRAM(S): 200 TABLET, FILM COATED ORAL at 21:03

## 2020-10-11 RX ADMIN — Medication 25 MILLIGRAM(S): at 05:10

## 2020-10-11 RX ADMIN — Medication 3 MILLILITER(S): at 20:05

## 2020-10-11 RX ADMIN — Medication 25 MILLIGRAM(S): at 17:07

## 2020-10-11 RX ADMIN — ENOXAPARIN SODIUM 40 MILLIGRAM(S): 100 INJECTION SUBCUTANEOUS at 11:08

## 2020-10-11 RX ADMIN — Medication 81 MILLIGRAM(S): at 11:08

## 2020-10-11 NOTE — PROGRESS NOTE ADULT - ASSESSMENT
-Likely COPD with A/E; no pna seen on cxr  -SOB  -wheeze  -Cough  -Hypoxic resp failure  -Renal insufficiency; worsened since July  -Dementia  -Anemia    RECC:  IV steroids with taper; change to PO tomorrow if no events. Abx. Nebs. O2. Follow renal fxn, lytes. DVT prophylaxis. Will need outpt f/u with PFTs.  Anemia per primary medical team.

## 2020-10-11 NOTE — PROGRESS NOTE ADULT - SUBJECTIVE AND OBJECTIVE BOX
NAOMI BAXTER  ----------------------------------------  The patient was seen and evaluated for COPD exacerbation. Offers no complaints. Reports that the dyspnea has improved.    Vital Signs Last 24 Hrs  T(C): 36.2 (11 Oct 2020 10:00), Max: 36.9 (10 Oct 2020 17:45)  T(F): 97.1 (11 Oct 2020 10:00), Max: 98.4 (10 Oct 2020 17:45)  HR: 84 (11 Oct 2020 10:00) (66 - 99)  BP: 115/72 (11 Oct 2020 10:00) (115/72 - 123/71)  BP(mean): --  RR: 18 (11 Oct 2020 10:00) (18 - 19)  SpO2: 93% (11 Oct 2020 10:00) (93% - 100%)    PHYSICAL EXAMINATION:  ----------------------------------------  General appearance: No acute distress, Awake, Alert  HEENT: Normocephalic, Atraumatic, Conjunctiva clear, EOMI  Neck: Supple, No JVD, No tenderness  Lungs: Breath sound equal bilaterally, No wheezes, No rales  Cardiovascular: S1S2, Regular rhythm  Abdomen: Soft, Nontender, Nondistended, No guarding/rebound, Positive bowel sounds  Extremities: No clubbing, No cyanosis, No edema, No calf tenderness  Neuro: Strength equal bilaterally, No tremors  Psychiatric: Appropriate mood, Normal affect    LABORATORY STUDIES:  ----------------------------------------             9.7    14.54 )-----------( 239      ( 11 Oct 2020 06:02 )             30.4     10-11    136  |  99  |  27.0<H>  ----------------------------<  154<H>  4.5   |  26.0  |  1.49<H>    Ca    8.8      11 Oct 2020 06:02  Mg     2.3     10-11    MEDICATIONS  (STANDING):  albuterol/ipratropium for Nebulization 3 milliLiter(s) Nebulizer every 6 hours  aspirin enteric coated 81 milliGRAM(s) Oral daily  buPROPion XL . 150 milliGRAM(s) Oral daily  cyanocobalamin 1000 MICROGram(s) Oral daily  enoxaparin Injectable 40 milliGRAM(s) SubCutaneous daily  escitalopram 10 milliGRAM(s) Oral daily  levothyroxine 137 MICROGram(s) Oral daily  melatonin 10 milliGRAM(s) Oral at bedtime  methylPREDNISolone sodium succinate Injectable 40 milliGRAM(s) IV Push every 12 hours  metoprolol tartrate 25 milliGRAM(s) Oral two times a day  pantoprazole    Tablet 40 milliGRAM(s) Oral before breakfast  QUEtiapine 25 milliGRAM(s) Oral two times a day    MEDICATIONS  (PRN):  LORazepam     Tablet 0.5 milliGRAM(s) Oral two times a day PRN Anxiety      ASSESSMENT / PLAN:  ----------------------------------------  85M with a history of COPD, bipolar disorder, dementia, anxiety/depression, hypothyroidism, hypertension, and thrombocytopenia who was referred to the hospital from the assisted living facility for dyspnea and productive cough despite treatment with levofloxacin.    Acute hypoxic respiratory failure / COPD exacerbation - On intravenous methylprednisolone with plans to taper as tolerated. On inhaled bronchodilator therapy. Supplemental oxygen as needed with titration as tolerated.    Chronic kidney disease III with acute kidney injury - Furosemide held on admission and intravenous fluids were administered in the emergency department. Renal function improved.    Hypothyroidism - On levothyroxine.    Bipolar disorder / Anxiety - On escitalopram, bupropion, and quetiapine. Noted to have been on lorazepam previously, to reinitiate.    Hypertension - Close blood pressure monitoring. On metoprolol.    Hypomagnesemia - Magnesium supplemented with improvement.

## 2020-10-11 NOTE — PROGRESS NOTE ADULT - SUBJECTIVE AND OBJECTIVE BOX
PULMONARY PROGRESS NOTE      [id number]    Patient is a 85y old  Male who presents with a chief complaint of acute hypoxemic respiratory failure, COPD exacerbation (10 Oct 2020 13:10)      INTERVAL HPI/OVERNIGHT EVENTS: He says he is feeling much better. He has less sob; hears less wheeze. No cp. On NCO2.    MEDICATIONS  (STANDING):  albuterol/ipratropium for Nebulization 3 milliLiter(s) Nebulizer every 6 hours  aspirin enteric coated 81 milliGRAM(s) Oral daily  buPROPion XL . 150 milliGRAM(s) Oral daily  cyanocobalamin 1000 MICROGram(s) Oral daily  enoxaparin Injectable 40 milliGRAM(s) SubCutaneous daily  escitalopram 10 milliGRAM(s) Oral daily  levothyroxine 137 MICROGram(s) Oral daily  melatonin 10 milliGRAM(s) Oral at bedtime  methylPREDNISolone sodium succinate Injectable 40 milliGRAM(s) IV Push every 12 hours  metoprolol tartrate 25 milliGRAM(s) Oral two times a day  pantoprazole    Tablet 40 milliGRAM(s) Oral before breakfast  QUEtiapine 25 milliGRAM(s) Oral two times a day      MEDICATIONS  (PRN):  LORazepam     Tablet 0.5 milliGRAM(s) Oral two times a day PRN Anxiety      Allergies    penicillin (Rash; Hives)    Intolerances        PAST MEDICAL & SURGICAL HISTORY:  Former smoker    Dementia    Chronic obstructive pulmonary disease (COPD)    Idiopathic thrombocytopenia    Hypothyroidism    Major depression    Anxiety disorder    S/P cholecystectomy    Aortic dissection  repair        SOCIAL HISTORY  Smoking History: former smoker      REVIEW OF SYSTEMS:    CONSTITUTIONAL:  No distress    HEENT:  Eyes:  No diplopia or blurred vision. ENT:  No earache, sore throat or runny nose.    CARDIOVASCULAR:  No pressure, squeezing, tightness, heaviness or aching about the chest; no palpitations.    RESPIRATORY:  per HPI     GASTROINTESTINAL:  No nausea, vomiting or diarrhea.    GENITOURINARY:  No dysuria, frequency or urgency.    NEUROLOGIC:  No paresthesias, fasciculations, seizures or weakness.    PSYCHIATRIC:  No disorder of thought or mood.    Vital Signs Last 24 Hrs  T(C): 36.7 (11 Oct 2020 04:41), Max: 36.9 (10 Oct 2020 17:45)  T(F): 98.1 (11 Oct 2020 04:41), Max: 98.4 (10 Oct 2020 17:45)  HR: 75 (11 Oct 2020 08:48) (66 - 100)  BP: 123/71 (11 Oct 2020 04:41) (113/66 - 123/71)  BP(mean): --  RR: 19 (11 Oct 2020 04:41) (18 - 19)  SpO2: 97% (11 Oct 2020 08:48) (93% - 100%)    PHYSICAL EXAMINATION:    GENERAL: The patient is awake and alert in no apparent distress.     HEENT: Head is normocephalic and atraumatic.  Mucous membranes are moist.    NECK: Supple.    LUNGS: scattered wheeze, good air entry, improved lung sounds compared with admission; respirations unlabored    HEART: Regular rate and rhythm      ABDOMEN: Soft, nontender, and nondistended.      EXTREMITIES: Without any cyanosis, clubbing, rash, lesions or edema.    NEUROLOGIC: Grossly intact.    LABS:                        9.7    14.54 )-----------( 239      ( 11 Oct 2020 06:02 )             30.4     10-11    136  |  99  |  27.0<H>  ----------------------------<  154<H>  4.5   |  26.0  |  1.49<H>    Ca    8.8      11 Oct 2020 06:02  Mg     2.3     10-11           Serum Pro-Brain Natriuretic Peptide: 490 pg/mL (10-09-20 @ 01:24)      Procalcitonin, Serum: 0.07 ng/mL (10-09-20 @ 09:49)

## 2020-10-12 ENCOUNTER — TRANSCRIPTION ENCOUNTER (OUTPATIENT)
Age: 85
End: 2020-10-12

## 2020-10-12 LAB
ANION GAP SERPL CALC-SCNC: 11 MMOL/L — SIGNIFICANT CHANGE UP (ref 5–17)
BUN SERPL-MCNC: 34 MG/DL — HIGH (ref 8–20)
CALCIUM SERPL-MCNC: 8.6 MG/DL — SIGNIFICANT CHANGE UP (ref 8.6–10.2)
CHLORIDE SERPL-SCNC: 100 MMOL/L — SIGNIFICANT CHANGE UP (ref 98–107)
CO2 SERPL-SCNC: 26 MMOL/L — SIGNIFICANT CHANGE UP (ref 22–29)
CREAT SERPL-MCNC: 1.34 MG/DL — HIGH (ref 0.5–1.3)
GLUCOSE SERPL-MCNC: 219 MG/DL — HIGH (ref 70–99)
HCT VFR BLD CALC: 32.1 % — LOW (ref 39–50)
HGB BLD-MCNC: 9.9 G/DL — LOW (ref 13–17)
MCHC RBC-ENTMCNC: 29.5 PG — SIGNIFICANT CHANGE UP (ref 27–34)
MCHC RBC-ENTMCNC: 30.8 GM/DL — LOW (ref 32–36)
MCV RBC AUTO: 95.5 FL — SIGNIFICANT CHANGE UP (ref 80–100)
PLATELET # BLD AUTO: 221 K/UL — SIGNIFICANT CHANGE UP (ref 150–400)
POTASSIUM SERPL-MCNC: 4.6 MMOL/L — SIGNIFICANT CHANGE UP (ref 3.5–5.3)
POTASSIUM SERPL-SCNC: 4.6 MMOL/L — SIGNIFICANT CHANGE UP (ref 3.5–5.3)
RBC # BLD: 3.36 M/UL — LOW (ref 4.2–5.8)
RBC # FLD: 16.5 % — HIGH (ref 10.3–14.5)
SODIUM SERPL-SCNC: 137 MMOL/L — SIGNIFICANT CHANGE UP (ref 135–145)
WBC # BLD: 12.56 K/UL — HIGH (ref 3.8–10.5)
WBC # FLD AUTO: 12.56 K/UL — HIGH (ref 3.8–10.5)

## 2020-10-12 PROCEDURE — 99232 SBSQ HOSP IP/OBS MODERATE 35: CPT

## 2020-10-12 PROCEDURE — 99497 ADVNCD CARE PLAN 30 MIN: CPT

## 2020-10-12 RX ADMIN — Medication 25 MILLIGRAM(S): at 05:59

## 2020-10-12 RX ADMIN — ENOXAPARIN SODIUM 40 MILLIGRAM(S): 100 INJECTION SUBCUTANEOUS at 11:05

## 2020-10-12 RX ADMIN — BUPROPION HYDROCHLORIDE 150 MILLIGRAM(S): 150 TABLET, EXTENDED RELEASE ORAL at 12:19

## 2020-10-12 RX ADMIN — Medication 3 MILLILITER(S): at 03:45

## 2020-10-12 RX ADMIN — Medication 137 MICROGRAM(S): at 05:59

## 2020-10-12 RX ADMIN — Medication 25 MILLIGRAM(S): at 17:03

## 2020-10-12 RX ADMIN — ESCITALOPRAM OXALATE 10 MILLIGRAM(S): 10 TABLET, FILM COATED ORAL at 11:05

## 2020-10-12 RX ADMIN — PREGABALIN 1000 MICROGRAM(S): 225 CAPSULE ORAL at 11:05

## 2020-10-12 RX ADMIN — PANTOPRAZOLE SODIUM 40 MILLIGRAM(S): 20 TABLET, DELAYED RELEASE ORAL at 06:00

## 2020-10-12 RX ADMIN — Medication 40 MILLIGRAM(S): at 06:00

## 2020-10-12 RX ADMIN — Medication 81 MILLIGRAM(S): at 11:05

## 2020-10-12 RX ADMIN — Medication 10 MILLIGRAM(S): at 21:12

## 2020-10-12 RX ADMIN — QUETIAPINE FUMARATE 25 MILLIGRAM(S): 200 TABLET, FILM COATED ORAL at 17:03

## 2020-10-12 RX ADMIN — QUETIAPINE FUMARATE 25 MILLIGRAM(S): 200 TABLET, FILM COATED ORAL at 05:58

## 2020-10-12 NOTE — PROGRESS NOTE ADULT - SUBJECTIVE AND OBJECTIVE BOX
NAOMI BAXTER  ----------------------------------------  The patient was seen and evaluated for COPD exacerbation. Offers no complaints. Denied dyspnea or chest pain.    Vital Signs Last 24 Hrs  T(C): 36.6 (12 Oct 2020 09:46), Max: 36.8 (11 Oct 2020 21:00)  T(F): 97.9 (12 Oct 2020 09:46), Max: 98.2 (11 Oct 2020 21:00)  HR: 75 (12 Oct 2020 09:46) (75 - 85)  BP: 115/73 (12 Oct 2020 09:46) (112/69 - 123/75)  BP(mean): --  RR: 18 (12 Oct 2020 09:46) (18 - 18)  SpO2: 95% (12 Oct 2020 09:46) (93% - 97%)    PHYSICAL EXAMINATION:  ----------------------------------------  General appearance: No acute distress, Awake, Alert  HEENT: Normocephalic, Atraumatic, Conjunctiva clear, EOMI  Neck: Supple, No JVD, No tenderness  Lungs: Breath sound equal bilaterally, No wheezes, No rales  Cardiovascular: S1S2, Regular rhythm  Abdomen: Soft, Nontender, Nondistended, No guarding/rebound, Positive bowel sounds  Extremities: No clubbing, No cyanosis, No edema, No calf tenderness  Neuro: Strength equal bilaterally, No tremors  Psychiatric: Appropriate mood, Normal affect    LABORATORY STUDIES:  ----------------------------------------             9.7    14.54 )-----------( 239      ( 11 Oct 2020 06:02 )             30.4     10-11    136  |  99  |  27.0<H>  ----------------------------<  154<H>  4.5   |  26.0  |  1.49<H>    Ca    8.8      11 Oct 2020 06:02  Mg     2.3     10-11    MEDICATIONS  (STANDING):  aspirin enteric coated 81 milliGRAM(s) Oral daily  buPROPion XL . 150 milliGRAM(s) Oral daily  cyanocobalamin 1000 MICROGram(s) Oral daily  enoxaparin Injectable 40 milliGRAM(s) SubCutaneous daily  escitalopram 10 milliGRAM(s) Oral daily  levothyroxine 137 MICROGram(s) Oral daily  melatonin 10 milliGRAM(s) Oral at bedtime  metoprolol tartrate 25 milliGRAM(s) Oral two times a day  pantoprazole    Tablet 40 milliGRAM(s) Oral before breakfast  QUEtiapine 25 milliGRAM(s) Oral two times a day    MEDICATIONS  (PRN):  LORazepam     Tablet 0.5 milliGRAM(s) Oral two times a day PRN Anxiety      ASSESSMENT / PLAN:  ----------------------------------------   NAOMI BAXTER  ----------------------------------------  The patient was seen and evaluated for COPD exacerbation. Offers no complaints. Denied dyspnea or chest pain.    Vital Signs Last 24 Hrs  T(C): 36.6 (12 Oct 2020 09:46), Max: 36.8 (11 Oct 2020 21:00)  T(F): 97.9 (12 Oct 2020 09:46), Max: 98.2 (11 Oct 2020 21:00)  HR: 75 (12 Oct 2020 09:46) (75 - 85)  BP: 115/73 (12 Oct 2020 09:46) (112/69 - 123/75)  BP(mean): --  RR: 18 (12 Oct 2020 09:46) (18 - 18)  SpO2: 95% (12 Oct 2020 09:46) (93% - 97%)    PHYSICAL EXAMINATION:  ----------------------------------------  General appearance: No acute distress, Awake, Alert  HEENT: Normocephalic, Atraumatic, Conjunctiva clear, EOMI  Neck: Supple, No JVD, No tenderness  Lungs: Breath sound equal bilaterally, No wheezes, No rales  Cardiovascular: S1S2, Regular rhythm  Abdomen: Soft, Nontender, Nondistended, No guarding/rebound, Positive bowel sounds  Extremities: No clubbing, No cyanosis, No edema, No calf tenderness  Neuro: Strength equal bilaterally, No tremors  Psychiatric: Appropriate mood, Normal affect    LABORATORY STUDIES:  ----------------------------------------             9.7    14.54 )-----------( 239      ( 11 Oct 2020 06:02 )             30.4     10-11    136  |  99  |  27.0<H>  ----------------------------<  154<H>  4.5   |  26.0  |  1.49<H>    Ca    8.8      11 Oct 2020 06:02  Mg     2.3     10-11    MEDICATIONS  (STANDING):  aspirin enteric coated 81 milliGRAM(s) Oral daily  buPROPion XL . 150 milliGRAM(s) Oral daily  cyanocobalamin 1000 MICROGram(s) Oral daily  enoxaparin Injectable 40 milliGRAM(s) SubCutaneous daily  escitalopram 10 milliGRAM(s) Oral daily  levothyroxine 137 MICROGram(s) Oral daily  melatonin 10 milliGRAM(s) Oral at bedtime  metoprolol tartrate 25 milliGRAM(s) Oral two times a day  pantoprazole    Tablet 40 milliGRAM(s) Oral before breakfast  QUEtiapine 25 milliGRAM(s) Oral two times a day    MEDICATIONS  (PRN):  LORazepam     Tablet 0.5 milliGRAM(s) Oral two times a day PRN Anxiety      ASSESSMENT / PLAN:  ----------------------------------------  85M with a history of COPD, bipolar disorder, dementia, anxiety/depression, hypothyroidism, hypertension, and thrombocytopenia who was referred to the hospital from the assisted living facility for dyspnea and productive cough despite treatment with levofloxacin. Intravenous methylprednisolone and inhaled bronchodilator therapy was initiated for COPD exacerbation.    Acute hypoxic respiratory failure / COPD exacerbation - No dyspnea or wheezing. To transition from intravenous methylprednisolone to prednisone to complete the course of steroids. On inhaled bronchodilator therapy. No hypoxia noted on ambient air at rest, for evaluation of oxygen saturation with ambulation.    Chronic kidney disease III with acute kidney injury - Furosemide held on admission and intravenous fluids were administered in the emergency department with improvement in the renal function.    Hypothyroidism - On levothyroxine.    Bipolar disorder / Anxiety - On escitalopram, bupropion, and quetiapine.    Hypertension - Close blood pressure monitoring. On metoprolol.    Hypomagnesemia - Magnesium previously supplemented with improvement.

## 2020-10-12 NOTE — GOALS OF CARE CONVERSATION - ADVANCED CARE PLANNING - CONVERSATION DETAILS
Discussed with the patient in regards to his overall condition and underlying medical issues. He reports significant improvement in his symptoms since admission and denied any similar episodes in the past. He denied any history of chronic dyspnea in the past and has otherwise been a good state of health. He reports that he is currently living at an assisted living facility and wishes for his condition to continued to improve. In regards to goals of care and advance directives, the patient wished to pursue resuscitation if necessary unless his condition were to be deemed irreversible.    32 minutes total time

## 2020-10-12 NOTE — DISCHARGE NOTE PROVIDER - NSDCMRMEDTOKEN_GEN_ALL_CORE_FT
albuterol 90 mcg/inh inhalation aerosol with adapter: 1 puff(s) inhaled 2 times a day  aspirin 81 mg oral delayed release tablet: 1 tab(s) orally once a day  buPROPion 150 mg/24 hours (XL) oral tablet, extended release: 1 tab(s) orally every 24 hours  cyanocobalamin 1000 mcg oral tablet: 1 tab(s) orally once a day  ergocalciferol 50,000 intl units (1.25 mg) oral capsule: 1 cap(s) orally once a week  escitalopram 10 mg oral tablet: 1 tab(s) orally once a day  Melatonin 10 mg oral capsule: 1 cap(s) orally once a day (at bedtime)  metoprolol tartrate 25 mg oral tablet: 1 tab(s) orally 2 times a day  predniSONE 20 mg oral tablet: 3 tab(s) orally once a day x 3 days  2 tab(s) orally once a day x 3 days  1 tab(s) orally once a day x 3 days  QUEtiapine 25 mg oral tablet: 1 tab(s) orally 2 times a day  Synthroid 137 mcg (0.137 mg) oral tablet: 1 tab(s) orally once a day   albuterol 90 mcg/inh inhalation aerosol with adapter: 1 puff(s) inhaled 2 times a day  aspirin 81 mg oral delayed release tablet: 1 tab(s) orally once a day  buPROPion 150 mg/24 hours (XL) oral tablet, extended release: 1 tab(s) orally every 24 hours  cyanocobalamin 1000 mcg oral tablet: 1 tab(s) orally once a day  ergocalciferol 50,000 intl units (1.25 mg) oral capsule: 1 cap(s) orally once a week  escitalopram 10 mg oral tablet: 1 tab(s) orally once a day  LORazepam 0.5 mg oral tablet: 1 tab(s) orally 2 times a day, As needed, Anxiety  Melatonin 10 mg oral capsule: 1 cap(s) orally once a day (at bedtime)  metoprolol tartrate 25 mg oral tablet: 1 tab(s) orally 2 times a day  predniSONE 20 mg oral tablet: 3 tab(s) orally once a day x 3 days  2 tab(s) orally once a day x 3 days  1 tab(s) orally once a day x 3 days  QUEtiapine 25 mg oral tablet: 1 tab(s) orally 2 times a day  Synthroid 137 mcg (0.137 mg) oral tablet: 1 tab(s) orally once a day

## 2020-10-12 NOTE — DISCHARGE NOTE PROVIDER - CARE PROVIDER_API CALL
Jesus Salmon  CRITICAL CARE MEDICINE  [address], [address]  [place], [place] [id number]  Phone: [phone]  Fax: [phone]  Follow Up Time:     Robin Gibbs  [address] [place] [address]  Phone: [phone]  Fax: [phone]  Follow Up Time:

## 2020-10-12 NOTE — DISCHARGE NOTE NURSING/CASE MANAGEMENT/SOCIAL WORK - PATIENT PORTAL LINK FT
You can access the FollowMyHealth Patient Portal offered by [facility] by registering at the following website: [email or web address]. By joining [employer]’s FollowMyHealth portal, you will also be able to view your health information using other applications (apps) compatible with our system.

## 2020-10-12 NOTE — PHYSICAL THERAPY INITIAL EVALUATION ADULT - GAIT PATTERN USED, PT EVAL
decreased gait velocity and activity tolerance, decreased upright posture, unsteadiness c turns, intermittent standing rest breaks due to fatigue

## 2020-10-12 NOTE — DISCHARGE NOTE PROVIDER - HOSPITAL COURSE
85M referred to the hospital from the assisted living facility with dyspnea. The patient was also reported to have a productive cough and had been treated with levofloxacin. On presentation, the patient was noted to have hypoxia, WBC(10.57), H/H(10.3/31.6), Na(134), Mg(1.5), BNP(490). Chest Xray was without acute pulmonary disease. The patient was seen by Pulmonary in consultation and intravenous methylprednisolone and inhaled bronchodilator therapy was initiated for COPD exacerbation. Furosemide was held on admission due to chronic kidney disease with acute kidney injury. Magnesium was supplemented for hypomagnesemia. The patient had improvement in the dyspnea and the dose of methylprednisolone was titrated. The patient had continued improvement and was transitioned to prednisone. The patient was discharged with instructions to follow up with his primary care physician and Pulmonary for further management.      39 minutes total time   85M referred to the hospital from the assisted living facility with dyspnea. The patient was also reported to have a productive cough and had been treated with levofloxacin. On presentation, the patient was noted to have hypoxia, WBC(10.57), H/H(10.3/31.6), Na(134), Mg(1.5), BNP(490). Chest Xray was without acute pulmonary disease. The patient was seen by Pulmonary in consultation and intravenous methylprednisolone and inhaled bronchodilator therapy was initiated for COPD exacerbation. Furosemide was held on admission due to chronic kidney disease with acute kidney injury. Magnesium was supplemented for hypomagnesemia. The patient had improvement in the dyspnea and the dose of methylprednisolone was titrated. The patient had continued improvement and was transitioned to prednisone. He was followed by Physical Therapy for further treatment with improvement in the ambulation. The patient was discharged with instructions to follow up with his primary care physician and Pulmonary for further management.      39 minutes total time

## 2020-10-12 NOTE — DISCHARGE NOTE NURSING/CASE MANAGEMENT/SOCIAL WORK - NSDCPETBCESMAN_GEN_ALL_CORE
If you are a smoker, it is important for your health to stop smoking. Please be aware that second hand smoke is also harmful. (4) no limitation

## 2020-10-12 NOTE — DISCHARGE NOTE PROVIDER - NSDCCPCAREPLAN_GEN_ALL_CORE_FT
PRINCIPAL DISCHARGE DIAGNOSIS  Diagnosis: COPD exacerbation  Assessment and Plan of Treatment: Continue on inhaled bronchodilator therapy. Completed the course of prednisone. Follow up with Pulmonary for further management.      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 Furosemide was discontinued.    Diagnosis: Hypothyroidism  Assessment and Plan of Treatment: Continue on levothyroxine.    Diagnosis: Anxiety  Assessment and Plan of Treatment: Continue on your home medications of escitalopram and bupropion. Follow up with your primary care physician for further management.

## 2020-10-12 NOTE — PHYSICAL THERAPY INITIAL EVALUATION ADULT - PERTINENT HX OF CURRENT PROBLEM, REHAB EVAL
pt presents to [facility] due to COPD excerbation and acute hypoxic respiratory failure

## 2020-10-13 PROCEDURE — 99232 SBSQ HOSP IP/OBS MODERATE 35: CPT

## 2020-10-13 RX ADMIN — Medication 25 MILLIGRAM(S): at 05:56

## 2020-10-13 RX ADMIN — PREGABALIN 1000 MICROGRAM(S): 225 CAPSULE ORAL at 11:00

## 2020-10-13 RX ADMIN — Medication 40 MILLIGRAM(S): at 05:56

## 2020-10-13 RX ADMIN — PANTOPRAZOLE SODIUM 40 MILLIGRAM(S): 20 TABLET, DELAYED RELEASE ORAL at 05:57

## 2020-10-13 RX ADMIN — Medication 137 MICROGRAM(S): at 05:57

## 2020-10-13 RX ADMIN — BUPROPION HYDROCHLORIDE 150 MILLIGRAM(S): 150 TABLET, EXTENDED RELEASE ORAL at 11:00

## 2020-10-13 RX ADMIN — Medication 81 MILLIGRAM(S): at 11:00

## 2020-10-13 RX ADMIN — ESCITALOPRAM OXALATE 10 MILLIGRAM(S): 10 TABLET, FILM COATED ORAL at 11:01

## 2020-10-13 RX ADMIN — QUETIAPINE FUMARATE 25 MILLIGRAM(S): 200 TABLET, FILM COATED ORAL at 05:56

## 2020-10-13 RX ADMIN — Medication 100 MILLIGRAM(S): at 05:56

## 2020-10-13 RX ADMIN — Medication 25 MILLIGRAM(S): at 17:39

## 2020-10-13 RX ADMIN — QUETIAPINE FUMARATE 25 MILLIGRAM(S): 200 TABLET, FILM COATED ORAL at 17:39

## 2020-10-13 RX ADMIN — Medication 10 MILLIGRAM(S): at 21:53

## 2020-10-13 RX ADMIN — ENOXAPARIN SODIUM 40 MILLIGRAM(S): 100 INJECTION SUBCUTANEOUS at 11:00

## 2020-10-13 NOTE — PROGRESS NOTE ADULT - SUBJECTIVE AND OBJECTIVE BOX
NAOMI BAXTER  ----------------------------------------  The patient was seen and evaluated for COPD exacerbation. Offers no complaints.    Vital Signs Last 24 Hrs  T(C): 36.6 (13 Oct 2020 10:17), Max: 36.8 (12 Oct 2020 16:32)  T(F): 97.9 (13 Oct 2020 10:17), Max: 98.2 (12 Oct 2020 16:32)  HR: 70 (13 Oct 2020 10:17) (70 - 83)  BP: 105/64 (13 Oct 2020 10:17) (105/64 - 129/73)  BP(mean): --  RR: 18 (13 Oct 2020 10:17) (18 - 18)  SpO2: 94% (13 Oct 2020 10:17) (90% - 98%)    PHYSICAL EXAMINATION:  ----------------------------------------  General appearance: No acute distress, Awake, Alert  HEENT: Normocephalic, Atraumatic, Conjunctiva clear, EOMI  Neck: Supple, No JVD, No tenderness  Lungs: Breath sound equal bilaterally, No wheezes, No rales  Cardiovascular: S1S2, Regular rhythm  Abdomen: Soft, Nontender, Nondistended, No guarding/rebound, Positive bowel sounds  Extremities: No clubbing, No cyanosis, No edema, No calf tenderness  Neuro: Strength equal bilaterally, No tremors  Psychiatric: Appropriate mood, Normal affect    LABORATORY STUDIES:  ----------------------------------------             9.9    12.56 )-----------( 221      ( 12 Oct 2020 12:26 )             32.1     10-12    137  |  100  |  34.0<H>  ----------------------------<  219<H>  4.6   |  26.0  |  1.34<H>    Ca    8.6      12 Oct 2020 12:26    MEDICATIONS  (STANDING):  aspirin enteric coated 81 milliGRAM(s) Oral daily  buPROPion XL . 150 milliGRAM(s) Oral daily  cyanocobalamin 1000 MICROGram(s) Oral daily  enoxaparin Injectable 40 milliGRAM(s) SubCutaneous daily  escitalopram 10 milliGRAM(s) Oral daily  levothyroxine 137 MICROGram(s) Oral daily  melatonin 10 milliGRAM(s) Oral at bedtime  metoprolol tartrate 25 milliGRAM(s) Oral two times a day  pantoprazole    Tablet 40 milliGRAM(s) Oral before breakfast  predniSONE   Tablet 40 milliGRAM(s) Oral daily  QUEtiapine 25 milliGRAM(s) Oral two times a day    MEDICATIONS  (PRN):  benzonatate 100 milliGRAM(s) Oral three times a day PRN Cough  LORazepam     Tablet 0.5 milliGRAM(s) Oral two times a day PRN Anxiety      ASSESSMENT / PLAN:  ----------------------------------------  85M with a history of COPD, bipolar disorder, dementia, anxiety/depression, hypothyroidism, hypertension, and thrombocytopenia who was referred to the hospital from the assisted living facility for dyspnea and productive cough despite treatment with levofloxacin. Intravenous methylprednisolone and inhaled bronchodilator therapy was initiated for COPD exacerbation with improvement.    Acute hypoxic respiratory failure / COPD exacerbation - No dyspnea or wheezing. Transitioned from intravenous methylprednisolone to prednisone. On inhaled bronchodilator therapy. No hypoxia noted on ambient air at rest, for evaluation of oxygen saturation with ambulation.    Chronic kidney disease III with acute kidney injury - Furosemide held on admission and intravenous fluids were administered in the emergency department with improvement in the renal function.    Hypothyroidism - On levothyroxine.    Bipolar disorder / Anxiety - On escitalopram, bupropion, and quetiapine.    Hypertension - Close blood pressure monitoring. On metoprolol.    Hypomagnesemia - Magnesium previously supplemented with improvement.

## 2020-10-14 VITALS
TEMPERATURE: 98 F | RESPIRATION RATE: 18 BRPM | DIASTOLIC BLOOD PRESSURE: 80 MMHG | HEART RATE: 71 BPM | SYSTOLIC BLOOD PRESSURE: 136 MMHG | OXYGEN SATURATION: 96 %

## 2020-10-14 PROCEDURE — 99239 HOSP IP/OBS DSCHRG MGMT >30: CPT

## 2020-10-14 RX ADMIN — QUETIAPINE FUMARATE 25 MILLIGRAM(S): 200 TABLET, FILM COATED ORAL at 05:20

## 2020-10-14 RX ADMIN — BUPROPION HYDROCHLORIDE 150 MILLIGRAM(S): 150 TABLET, EXTENDED RELEASE ORAL at 12:58

## 2020-10-14 RX ADMIN — ESCITALOPRAM OXALATE 10 MILLIGRAM(S): 10 TABLET, FILM COATED ORAL at 12:59

## 2020-10-14 RX ADMIN — Medication 137 MICROGRAM(S): at 05:20

## 2020-10-14 RX ADMIN — ENOXAPARIN SODIUM 40 MILLIGRAM(S): 100 INJECTION SUBCUTANEOUS at 12:59

## 2020-10-14 RX ADMIN — Medication 25 MILLIGRAM(S): at 05:20

## 2020-10-14 RX ADMIN — PANTOPRAZOLE SODIUM 40 MILLIGRAM(S): 20 TABLET, DELAYED RELEASE ORAL at 05:20

## 2020-10-14 RX ADMIN — Medication 40 MILLIGRAM(S): at 05:21

## 2020-10-14 RX ADMIN — Medication 81 MILLIGRAM(S): at 12:59

## 2020-10-14 RX ADMIN — PREGABALIN 1000 MICROGRAM(S): 225 CAPSULE ORAL at 12:59

## 2020-10-14 RX ADMIN — Medication 100 MILLIGRAM(S): at 05:21

## 2020-10-14 NOTE — PROGRESS NOTE ADULT - PROVIDER SPECIALTY LIST ADULT
Hospitalist
Pulmonology
Pulmonology
none

## 2020-10-14 NOTE — PROGRESS NOTE ADULT - REASON FOR ADMISSION
acute hypoxemic respiratory failure, COPD exacerbation

## 2020-10-14 NOTE — PROGRESS NOTE ADULT - SUBJECTIVE AND OBJECTIVE BOX
NAOMI BAXTER  ----------------------------------------  The patient was seen and evaluated for COPD. Offers no complaints. Denied dyspnea or chest pain.    Vital Signs Last 24 Hrs  T(C): 37 (14 Oct 2020 04:12), Max: 37 (14 Oct 2020 04:12)  T(F): 98.6 (14 Oct 2020 04:12), Max: 98.6 (14 Oct 2020 04:12)  HR: 68 (14 Oct 2020 04:12) (68 - 75)  BP: 118/73 (14 Oct 2020 04:12) (118/73 - 126/73)  BP(mean): --  RR: 18 (14 Oct 2020 04:12) (18 - 18)  SpO2: 95% (14 Oct 2020 04:12) (94% - 95%)    PHYSICAL EXAMINATION:  ----------------------------------------  General appearance: No acute distress, Awake, Alert  HEENT: Normocephalic, Atraumatic, Conjunctiva clear, EOMI  Neck: Supple, No JVD, No tenderness  Lungs: Breath sound equal bilaterally, No wheezes, No rales  Cardiovascular: S1S2, Regular rhythm  Abdomen: Soft, Nontender, Nondistended, No guarding/rebound, Positive bowel sounds  Extremities: No clubbing, No cyanosis, No edema, No calf tenderness  Neuro: Strength equal bilaterally, No tremors  Psychiatric: Appropriate mood, Normal affect    LABORATORY STUDIES:  ----------------------------------------             9.9    12.56 )-----------( 221      ( 12 Oct 2020 12:26 )             32.1     10-12    137  |  100  |  34.0<H>  ----------------------------<  219<H>  4.6   |  26.0  |  1.34<H>    Ca    8.6      12 Oct 2020 12:26    MEDICATIONS  (STANDING):  aspirin enteric coated 81 milliGRAM(s) Oral daily  buPROPion XL . 150 milliGRAM(s) Oral daily  cyanocobalamin 1000 MICROGram(s) Oral daily  enoxaparin Injectable 40 milliGRAM(s) SubCutaneous daily  escitalopram 10 milliGRAM(s) Oral daily  levothyroxine 137 MICROGram(s) Oral daily  melatonin 10 milliGRAM(s) Oral at bedtime  metoprolol tartrate 25 milliGRAM(s) Oral two times a day  pantoprazole    Tablet 40 milliGRAM(s) Oral before breakfast  predniSONE   Tablet 40 milliGRAM(s) Oral daily  QUEtiapine 25 milliGRAM(s) Oral two times a day    MEDICATIONS  (PRN):  benzonatate 100 milliGRAM(s) Oral three times a day PRN Cough  LORazepam     Tablet 0.5 milliGRAM(s) Oral two times a day PRN Anxiety      ASSESSMENT / PLAN:  ----------------------------------------  85M with a history of COPD, bipolar disorder, dementia, anxiety/depression, hypothyroidism, hypertension, and thrombocytopenia who was referred to the hospital from the assisted living facility for dyspnea and productive cough despite treatment with levofloxacin. Intravenous methylprednisolone and inhaled bronchodilator therapy was initiated for COPD exacerbation with improvement.    Acute hypoxic respiratory failure / COPD exacerbation - Previously transitioned from intravenous methylprednisolone to prednisone. On inhaled bronchodilator therapy. No hypoxia noted on ambient air at rest, for evaluation of oxygen saturation with ambulation.    Chronic kidney disease III with acute kidney injury - Furosemide held on admission and intravenous fluids were administered in the emergency department with improvement in the renal function.    Hypothyroidism - On levothyroxine.    Bipolar disorder / Anxiety - On escitalopram, bupropion, and quetiapine.    Hypertension - Close blood pressure monitoring. On metoprolol.    Hypomagnesemia - Magnesium previously supplemented with improvement.

## 2020-10-15 DIAGNOSIS — E03.9 HYPOTHYROIDISM, UNSPECIFIED: ICD-10-CM

## 2020-10-15 DIAGNOSIS — J44.9 CHRONIC OBSTRUCTIVE PULMONARY DISEASE, UNSPECIFIED: ICD-10-CM

## 2020-10-15 DIAGNOSIS — F41.9 ANXIETY DISORDER, UNSPECIFIED: ICD-10-CM

## 2020-10-15 DIAGNOSIS — F31.60 BIPOLAR DISORDER, CURRENT EPISODE MIXED, UNSPECIFIED: ICD-10-CM

## 2020-10-15 DIAGNOSIS — F32.9 ANXIETY DISORDER, UNSPECIFIED: ICD-10-CM

## 2020-10-15 DIAGNOSIS — I10 ESSENTIAL (PRIMARY) HYPERTENSION: ICD-10-CM

## 2020-10-15 DIAGNOSIS — G47.00 INSOMNIA, UNSPECIFIED: ICD-10-CM

## 2020-10-15 RX ORDER — QUETIAPINE FUMARATE 25 MG/1
25 TABLET ORAL TWICE DAILY
Refills: 0 | Status: ACTIVE | COMMUNITY
Start: 2020-10-15

## 2020-10-15 RX ORDER — METOPROLOL TARTRATE 25 MG/1
25 TABLET, FILM COATED ORAL TWICE DAILY
Qty: 60 | Refills: 0 | Status: ACTIVE | COMMUNITY
Start: 2020-10-15

## 2020-10-15 RX ORDER — LORAZEPAM 0.5 MG/1
0.5 TABLET ORAL TWICE DAILY
Refills: 0 | Status: ACTIVE | COMMUNITY
Start: 2020-10-15

## 2020-10-15 RX ORDER — BUPROPION HYDROCHLORIDE 150 MG/1
150 TABLET, FILM COATED, EXTENDED RELEASE ORAL DAILY
Refills: 0 | Status: ACTIVE | COMMUNITY
Start: 2020-10-15

## 2020-10-15 RX ORDER — GLUCOSAMINE/MSM/CHONDROIT SULF 500-166.6
10 TABLET ORAL AT BEDTIME
Refills: 0 | Status: ACTIVE | COMMUNITY
Start: 2020-10-15

## 2020-10-15 RX ORDER — OMEGA-3/DHA/EPA/FISH OIL 35-113.5MG
1000 TABLET,CHEWABLE ORAL DAILY
Refills: 0 | Status: ACTIVE | COMMUNITY
Start: 2020-10-15

## 2020-10-15 RX ORDER — ERGOCALCIFEROL 1.25 MG/1
1.25 MG CAPSULE, LIQUID FILLED ORAL
Qty: 13 | Refills: 3 | Status: ACTIVE | COMMUNITY
Start: 2020-10-15

## 2020-10-15 RX ORDER — LEVOTHYROXINE SODIUM 0.14 MG/1
137 TABLET ORAL DAILY
Refills: 0 | Status: ACTIVE | COMMUNITY
Start: 2020-10-15

## 2020-10-15 RX ORDER — ALBUTEROL SULFATE 90 UG/1
108 (90 BASE) INHALANT RESPIRATORY (INHALATION)
Qty: 1 | Refills: 3 | Status: ACTIVE | COMMUNITY
Start: 2020-10-15

## 2020-10-15 RX ORDER — PREDNISONE 20 MG/1
20 TABLET ORAL
Refills: 0 | Status: ACTIVE | COMMUNITY
Start: 2020-10-15

## 2020-10-15 RX ORDER — ESCITALOPRAM OXALATE 10 MG/1
10 TABLET ORAL DAILY
Qty: 30 | Refills: 2 | Status: ACTIVE | COMMUNITY
Start: 2020-10-15

## 2020-10-19 ENCOUNTER — APPOINTMENT (OUTPATIENT)
Dept: PULMONOLOGY | Facility: CLINIC | Age: 85
End: 2020-10-19

## 2020-10-21 PROCEDURE — 99285 EMERGENCY DEPT VISIT HI MDM: CPT | Mod: 25

## 2020-10-21 PROCEDURE — 83735 ASSAY OF MAGNESIUM: CPT

## 2020-10-21 PROCEDURE — 85610 PROTHROMBIN TIME: CPT

## 2020-10-21 PROCEDURE — 84145 PROCALCITONIN (PCT): CPT

## 2020-10-21 PROCEDURE — 36415 COLL VENOUS BLD VENIPUNCTURE: CPT

## 2020-10-21 PROCEDURE — 85025 COMPLETE CBC W/AUTO DIFF WBC: CPT

## 2020-10-21 PROCEDURE — 71045 X-RAY EXAM CHEST 1 VIEW: CPT

## 2020-10-21 PROCEDURE — 83880 ASSAY OF NATRIURETIC PEPTIDE: CPT

## 2020-10-21 PROCEDURE — 86769 SARS-COV-2 COVID-19 ANTIBODY: CPT

## 2020-10-21 PROCEDURE — 97110 THERAPEUTIC EXERCISES: CPT

## 2020-10-21 PROCEDURE — 85730 THROMBOPLASTIN TIME PARTIAL: CPT

## 2020-10-21 PROCEDURE — 85027 COMPLETE CBC AUTOMATED: CPT

## 2020-10-21 PROCEDURE — 97116 GAIT TRAINING THERAPY: CPT

## 2020-10-21 PROCEDURE — U0003: CPT

## 2020-10-21 PROCEDURE — 96374 THER/PROPH/DIAG INJ IV PUSH: CPT

## 2020-10-21 PROCEDURE — 83690 ASSAY OF LIPASE: CPT

## 2020-10-21 PROCEDURE — 94640 AIRWAY INHALATION TREATMENT: CPT

## 2020-10-21 PROCEDURE — 97163 PT EVAL HIGH COMPLEX 45 MIN: CPT

## 2020-10-21 PROCEDURE — 94760 N-INVAS EAR/PLS OXIMETRY 1: CPT

## 2020-10-21 PROCEDURE — 96375 TX/PRO/DX INJ NEW DRUG ADDON: CPT

## 2020-10-21 PROCEDURE — 84484 ASSAY OF TROPONIN QUANT: CPT

## 2020-10-21 PROCEDURE — 80048 BASIC METABOLIC PNL TOTAL CA: CPT

## 2020-10-21 PROCEDURE — 80053 COMPREHEN METABOLIC PANEL: CPT

## 2020-10-21 PROCEDURE — 93005 ELECTROCARDIOGRAM TRACING: CPT

## 2020-10-22 ENCOUNTER — TRANSCRIPTION ENCOUNTER (OUTPATIENT)
Age: 85
End: 2020-10-22

## 2020-10-22 ENCOUNTER — INPATIENT (INPATIENT)
Facility: HOSPITAL | Age: 85
LOS: 7 days | Discharge: NURSING, REHAB OR LONG-TERM CARE | DRG: 717 | End: 2020-10-30
Attending: HOSPITALIST | Admitting: INTERNAL MEDICINE
Payer: MEDICARE

## 2020-10-22 VITALS
WEIGHT: 250 LBS | DIASTOLIC BLOOD PRESSURE: 70 MMHG | SYSTOLIC BLOOD PRESSURE: 123 MMHG | HEIGHT: 71 IN | HEART RATE: 94 BPM | OXYGEN SATURATION: 96 % | TEMPERATURE: 98 F | RESPIRATION RATE: 16 BRPM

## 2020-10-22 DIAGNOSIS — Z90.49 ACQUIRED ABSENCE OF OTHER SPECIFIED PARTS OF DIGESTIVE TRACT: Chronic | ICD-10-CM

## 2020-10-22 DIAGNOSIS — K57.92 DIVERTICULITIS OF INTESTINE, PART UNSPECIFIED, WITHOUT PERFORATION OR ABSCESS WITHOUT BLEEDING: ICD-10-CM

## 2020-10-22 DIAGNOSIS — I71.00 DISSECTION OF UNSPECIFIED SITE OF AORTA: Chronic | ICD-10-CM

## 2020-10-22 LAB
ALBUMIN SERPL ELPH-MCNC: 3.4 G/DL — SIGNIFICANT CHANGE UP (ref 3.3–5.2)
ALP SERPL-CCNC: 67 U/L — SIGNIFICANT CHANGE UP (ref 40–120)
ALT FLD-CCNC: 15 U/L — SIGNIFICANT CHANGE UP
ANION GAP SERPL CALC-SCNC: 16 MMOL/L — SIGNIFICANT CHANGE UP (ref 5–17)
APPEARANCE UR: CLEAR — SIGNIFICANT CHANGE UP
AST SERPL-CCNC: 16 U/L — SIGNIFICANT CHANGE UP
BACTERIA # UR AUTO: ABNORMAL
BASE EXCESS BLDV CALC-SCNC: -2.2 MMOL/L — LOW (ref -2–2)
BASOPHILS # BLD AUTO: 0 K/UL — SIGNIFICANT CHANGE UP (ref 0–0.2)
BASOPHILS # BLD AUTO: 0.03 K/UL — SIGNIFICANT CHANGE UP (ref 0–0.2)
BASOPHILS NFR BLD AUTO: 0 % — SIGNIFICANT CHANGE UP (ref 0–2)
BASOPHILS NFR BLD AUTO: 0.1 % — SIGNIFICANT CHANGE UP (ref 0–2)
BILIRUB SERPL-MCNC: 0.7 MG/DL — SIGNIFICANT CHANGE UP (ref 0.4–2)
BILIRUB UR-MCNC: NEGATIVE — SIGNIFICANT CHANGE UP
BUN SERPL-MCNC: 21 MG/DL — HIGH (ref 8–20)
CA-I SERPL-SCNC: 1.04 MMOL/L — LOW (ref 1.15–1.33)
CALCIUM SERPL-MCNC: 8.6 MG/DL — SIGNIFICANT CHANGE UP (ref 8.6–10.2)
CHLORIDE BLDV-SCNC: 105 MMOL/L — SIGNIFICANT CHANGE UP (ref 98–107)
CHLORIDE SERPL-SCNC: 98 MMOL/L — SIGNIFICANT CHANGE UP (ref 98–107)
CO2 SERPL-SCNC: 23 MMOL/L — SIGNIFICANT CHANGE UP (ref 22–29)
COLOR SPEC: YELLOW — SIGNIFICANT CHANGE UP
CREAT SERPL-MCNC: 1.39 MG/DL — HIGH (ref 0.5–1.3)
DIFF PNL FLD: NEGATIVE — SIGNIFICANT CHANGE UP
EOSINOPHIL # BLD AUTO: 0 K/UL — SIGNIFICANT CHANGE UP (ref 0–0.5)
EOSINOPHIL # BLD AUTO: 0.01 K/UL — SIGNIFICANT CHANGE UP (ref 0–0.5)
EOSINOPHIL NFR BLD AUTO: 0 % — SIGNIFICANT CHANGE UP (ref 0–6)
EOSINOPHIL NFR BLD AUTO: 0 % — SIGNIFICANT CHANGE UP (ref 0–6)
EPI CELLS # UR: NEGATIVE — SIGNIFICANT CHANGE UP
GAS PNL BLDV: 140 MMOL/L — SIGNIFICANT CHANGE UP (ref 135–145)
GAS PNL BLDV: SIGNIFICANT CHANGE UP
GAS PNL BLDV: SIGNIFICANT CHANGE UP
GLUCOSE BLDV-MCNC: 112 MG/DL — HIGH (ref 70–99)
GLUCOSE SERPL-MCNC: 93 MG/DL — SIGNIFICANT CHANGE UP (ref 70–99)
GLUCOSE UR QL: NEGATIVE MG/DL — SIGNIFICANT CHANGE UP
HCO3 BLDV-SCNC: 22 MMOL/L — SIGNIFICANT CHANGE UP (ref 20–26)
HCT VFR BLD CALC: 30.6 % — LOW (ref 39–50)
HCT VFR BLD CALC: 34.2 % — LOW (ref 39–50)
HCT VFR BLDA CALC: 32 — LOW (ref 39–50)
HGB BLD CALC-MCNC: 10.6 G/DL — LOW (ref 13–17)
HGB BLD-MCNC: 11 G/DL — LOW (ref 13–17)
HGB BLD-MCNC: 9.7 G/DL — LOW (ref 13–17)
IMM GRANULOCYTES NFR BLD AUTO: 0.9 % — SIGNIFICANT CHANGE UP (ref 0–1.5)
KETONES UR-MCNC: NEGATIVE — SIGNIFICANT CHANGE UP
LACTATE BLDV-MCNC: 2.4 MMOL/L — HIGH (ref 0.5–2)
LACTATE BLDV-MCNC: 2.5 MMOL/L — HIGH (ref 0.5–2)
LACTATE BLDV-MCNC: 4.2 MMOL/L — CRITICAL HIGH (ref 0.5–2)
LEUKOCYTE ESTERASE UR-ACNC: ABNORMAL
LYMPHOCYTES # BLD AUTO: 0 % — LOW (ref 13–44)
LYMPHOCYTES # BLD AUTO: 0 K/UL — LOW (ref 1–3.3)
LYMPHOCYTES # BLD AUTO: 0.8 K/UL — LOW (ref 1–3.3)
LYMPHOCYTES # BLD AUTO: 3.2 % — LOW (ref 13–44)
MANUAL SMEAR VERIFICATION: SIGNIFICANT CHANGE UP
MCHC RBC-ENTMCNC: 30 PG — SIGNIFICANT CHANGE UP (ref 27–34)
MCHC RBC-ENTMCNC: 30.2 PG — SIGNIFICANT CHANGE UP (ref 27–34)
MCHC RBC-ENTMCNC: 31.7 GM/DL — LOW (ref 32–36)
MCHC RBC-ENTMCNC: 32.2 GM/DL — SIGNIFICANT CHANGE UP (ref 32–36)
MCV RBC AUTO: 93.2 FL — SIGNIFICANT CHANGE UP (ref 80–100)
MCV RBC AUTO: 95.3 FL — SIGNIFICANT CHANGE UP (ref 80–100)
MONOCYTES # BLD AUTO: 0.74 K/UL — SIGNIFICANT CHANGE UP (ref 0–0.9)
MONOCYTES # BLD AUTO: 0.88 K/UL — SIGNIFICANT CHANGE UP (ref 0–0.9)
MONOCYTES NFR BLD AUTO: 2.6 % — SIGNIFICANT CHANGE UP (ref 2–14)
MONOCYTES NFR BLD AUTO: 3.6 % — SIGNIFICANT CHANGE UP (ref 2–14)
MYELOCYTES NFR BLD: 0.9 % — HIGH (ref 0–0)
NEUTROPHILS # BLD AUTO: 22.74 K/UL — HIGH (ref 1.8–7.4)
NEUTROPHILS # BLD AUTO: 27.33 K/UL — HIGH (ref 1.8–7.4)
NEUTROPHILS NFR BLD AUTO: 92.2 % — HIGH (ref 43–77)
NEUTROPHILS NFR BLD AUTO: 96.5 % — HIGH (ref 43–77)
NITRITE UR-MCNC: NEGATIVE — SIGNIFICANT CHANGE UP
OTHER CELLS CSF MANUAL: 8 ML/DL — LOW (ref 18–22)
PCO2 BLDV: 46 MMHG — SIGNIFICANT CHANGE UP (ref 35–50)
PH BLDV: 7.33 — SIGNIFICANT CHANGE UP (ref 7.32–7.43)
PH UR: 6 — SIGNIFICANT CHANGE UP (ref 5–8)
PLAT MORPH BLD: NORMAL — SIGNIFICANT CHANGE UP
PLATELET # BLD AUTO: 172 K/UL — SIGNIFICANT CHANGE UP (ref 150–400)
PLATELET # BLD AUTO: 221 K/UL — SIGNIFICANT CHANGE UP (ref 150–400)
PO2 BLDV: 35 MMHG — SIGNIFICANT CHANGE UP (ref 25–45)
POTASSIUM BLDV-SCNC: 4 MMOL/L — SIGNIFICANT CHANGE UP (ref 3.4–4.5)
POTASSIUM SERPL-MCNC: 4.5 MMOL/L — SIGNIFICANT CHANGE UP (ref 3.5–5.3)
POTASSIUM SERPL-SCNC: 4.5 MMOL/L — SIGNIFICANT CHANGE UP (ref 3.5–5.3)
PROT SERPL-MCNC: 6.4 G/DL — LOW (ref 6.6–8.7)
PROT UR-MCNC: NEGATIVE MG/DL — SIGNIFICANT CHANGE UP
RBC # BLD: 3.21 M/UL — LOW (ref 4.2–5.8)
RBC # BLD: 3.67 M/UL — LOW (ref 4.2–5.8)
RBC # FLD: 16.7 % — HIGH (ref 10.3–14.5)
RBC # FLD: 16.9 % — HIGH (ref 10.3–14.5)
RBC BLD AUTO: NORMAL — SIGNIFICANT CHANGE UP
RBC CASTS # UR COMP ASSIST: NEGATIVE /HPF — SIGNIFICANT CHANGE UP (ref 0–4)
SAO2 % BLDV: 59 % — SIGNIFICANT CHANGE UP
SARS-COV-2 RNA SPEC QL NAA+PROBE: SIGNIFICANT CHANGE UP
SARS-COV-2 RNA SPEC QL NAA+PROBE: SIGNIFICANT CHANGE UP
SODIUM SERPL-SCNC: 137 MMOL/L — SIGNIFICANT CHANGE UP (ref 135–145)
SP GR SPEC: 1.01 — SIGNIFICANT CHANGE UP (ref 1.01–1.02)
UROBILINOGEN FLD QL: NEGATIVE MG/DL — SIGNIFICANT CHANGE UP
WBC # BLD: 24.68 K/UL — HIGH (ref 3.8–10.5)
WBC # BLD: 28.32 K/UL — HIGH (ref 3.8–10.5)
WBC # FLD AUTO: 24.68 K/UL — HIGH (ref 3.8–10.5)
WBC # FLD AUTO: 28.32 K/UL — HIGH (ref 3.8–10.5)
WBC UR QL: ABNORMAL

## 2020-10-22 PROCEDURE — 76857 US EXAM PELVIC LIMITED: CPT | Mod: 26

## 2020-10-22 PROCEDURE — 99285 EMERGENCY DEPT VISIT HI MDM: CPT | Mod: CS

## 2020-10-22 PROCEDURE — 71046 X-RAY EXAM CHEST 2 VIEWS: CPT | Mod: 26

## 2020-10-22 PROCEDURE — 74176 CT ABD & PELVIS W/O CONTRAST: CPT | Mod: 26

## 2020-10-22 PROCEDURE — 99223 1ST HOSP IP/OBS HIGH 75: CPT

## 2020-10-22 RX ORDER — IPRATROPIUM/ALBUTEROL SULFATE 18-103MCG
3 AEROSOL WITH ADAPTER (GRAM) INHALATION EVERY 6 HOURS
Refills: 0 | Status: DISCONTINUED | OUTPATIENT
Start: 2020-10-22 | End: 2020-10-27

## 2020-10-22 RX ORDER — LANOLIN ALCOHOL/MO/W.PET/CERES
5 CREAM (GRAM) TOPICAL AT BEDTIME
Refills: 0 | Status: DISCONTINUED | OUTPATIENT
Start: 2020-10-22 | End: 2020-10-27

## 2020-10-22 RX ORDER — ALBUTEROL 90 UG/1
2 AEROSOL, METERED ORAL EVERY 6 HOURS
Refills: 0 | Status: DISCONTINUED | OUTPATIENT
Start: 2020-10-22 | End: 2020-10-27

## 2020-10-22 RX ORDER — METOPROLOL TARTRATE 50 MG
25 TABLET ORAL
Refills: 0 | Status: DISCONTINUED | OUTPATIENT
Start: 2020-10-22 | End: 2020-10-27

## 2020-10-22 RX ORDER — BUPROPION HYDROCHLORIDE 150 MG/1
150 TABLET, EXTENDED RELEASE ORAL DAILY
Refills: 0 | Status: DISCONTINUED | OUTPATIENT
Start: 2020-10-23 | End: 2020-10-27

## 2020-10-22 RX ORDER — SACCHAROMYCES BOULARDII 250 MG
250 POWDER IN PACKET (EA) ORAL
Refills: 0 | Status: DISCONTINUED | OUTPATIENT
Start: 2020-10-22 | End: 2020-10-27

## 2020-10-22 RX ORDER — QUETIAPINE FUMARATE 200 MG/1
25 TABLET, FILM COATED ORAL AT BEDTIME
Refills: 0 | Status: DISCONTINUED | OUTPATIENT
Start: 2020-10-22 | End: 2020-10-27

## 2020-10-22 RX ORDER — METRONIDAZOLE 500 MG
500 TABLET ORAL EVERY 8 HOURS
Refills: 0 | Status: DISCONTINUED | OUTPATIENT
Start: 2020-10-22 | End: 2020-10-27

## 2020-10-22 RX ORDER — PREGABALIN 225 MG/1
1000 CAPSULE ORAL DAILY
Refills: 0 | Status: DISCONTINUED | OUTPATIENT
Start: 2020-10-22 | End: 2020-10-27

## 2020-10-22 RX ORDER — HEPARIN SODIUM 5000 [USP'U]/ML
5000 INJECTION INTRAVENOUS; SUBCUTANEOUS EVERY 12 HOURS
Refills: 0 | Status: DISCONTINUED | OUTPATIENT
Start: 2020-10-23 | End: 2020-10-27

## 2020-10-22 RX ORDER — CEFTRIAXONE 500 MG/1
1000 INJECTION, POWDER, FOR SOLUTION INTRAMUSCULAR; INTRAVENOUS EVERY 24 HOURS
Refills: 0 | Status: DISCONTINUED | OUTPATIENT
Start: 2020-10-23 | End: 2020-10-27

## 2020-10-22 RX ORDER — LEVOTHYROXINE SODIUM 125 MCG
137 TABLET ORAL DAILY
Refills: 0 | Status: DISCONTINUED | OUTPATIENT
Start: 2020-10-23 | End: 2020-10-27

## 2020-10-22 RX ORDER — ESCITALOPRAM OXALATE 10 MG/1
10 TABLET, FILM COATED ORAL DAILY
Refills: 0 | Status: DISCONTINUED | OUTPATIENT
Start: 2020-10-23 | End: 2020-10-27

## 2020-10-22 RX ORDER — CEFTRIAXONE 500 MG/1
1000 INJECTION, POWDER, FOR SOLUTION INTRAMUSCULAR; INTRAVENOUS ONCE
Refills: 0 | Status: COMPLETED | OUTPATIENT
Start: 2020-10-22 | End: 2020-10-22

## 2020-10-22 RX ORDER — ASPIRIN/CALCIUM CARB/MAGNESIUM 324 MG
81 TABLET ORAL DAILY
Refills: 0 | Status: DISCONTINUED | OUTPATIENT
Start: 2020-10-22 | End: 2020-10-27

## 2020-10-22 RX ORDER — METRONIDAZOLE 500 MG
500 TABLET ORAL ONCE
Refills: 0 | Status: COMPLETED | OUTPATIENT
Start: 2020-10-22 | End: 2020-10-22

## 2020-10-22 RX ORDER — SODIUM CHLORIDE 9 MG/ML
2000 INJECTION INTRAMUSCULAR; INTRAVENOUS; SUBCUTANEOUS ONCE
Refills: 0 | Status: COMPLETED | OUTPATIENT
Start: 2020-10-22 | End: 2020-10-22

## 2020-10-22 RX ADMIN — SODIUM CHLORIDE 2000 MILLILITER(S): 9 INJECTION INTRAMUSCULAR; INTRAVENOUS; SUBCUTANEOUS at 15:32

## 2020-10-22 RX ADMIN — Medication 250 MILLIGRAM(S): at 18:10

## 2020-10-22 RX ADMIN — CEFTRIAXONE 1000 MILLIGRAM(S): 500 INJECTION, POWDER, FOR SOLUTION INTRAMUSCULAR; INTRAVENOUS at 14:44

## 2020-10-22 RX ADMIN — Medication 100 MILLIGRAM(S): at 15:38

## 2020-10-22 RX ADMIN — Medication 500 MILLIGRAM(S): at 22:08

## 2020-10-22 RX ADMIN — Medication 5 MILLIGRAM(S): at 22:08

## 2020-10-22 RX ADMIN — CEFTRIAXONE 100 MILLIGRAM(S): 500 INJECTION, POWDER, FOR SOLUTION INTRAMUSCULAR; INTRAVENOUS at 13:13

## 2020-10-22 RX ADMIN — QUETIAPINE FUMARATE 25 MILLIGRAM(S): 200 TABLET, FILM COATED ORAL at 22:08

## 2020-10-22 RX ADMIN — Medication 25 MILLIGRAM(S): at 18:10

## 2020-10-22 RX ADMIN — SODIUM CHLORIDE 2000 MILLILITER(S): 9 INJECTION INTRAMUSCULAR; INTRAVENOUS; SUBCUTANEOUS at 13:26

## 2020-10-22 RX ADMIN — Medication 3 MILLILITER(S): at 20:02

## 2020-10-22 NOTE — ED ADULT NURSE NOTE - CHIEF COMPLAINT QUOTE
pt c/o burning with urination x 1 week increased frequency and increased urge to urinate. pt states "it burns when I pee" main complaint of burning while urinating  pt offers no other complaints at this time. from atul in [facility].

## 2020-10-22 NOTE — ED PROVIDER NOTE - ATTENDING CONTRIBUTION TO CARE
HPI: 86yo male with h/o anxiety, COPD, hypothyroid, depression, dementia p/w dysuria x 1 day, no fevers/chills. NO other complaints. [facility]. Denies rectal pain.     PE:  Gen: NAD  Head: NCAT  HEENT: Oral mucosa moist, normal conjunctiva  CV: RRR no murmurs, normal perfusion  Resp: CTA b/l, no wheezing, rales, rhonchi, no respiratory distress  GI: Abd Soft NTND  Neuro: No focal neuro deficits  MSK: FROM all 4 extremities, no deformity  Skin: No rash, no bruising  Psych: Normal affect  : Uncircumcised male, normal external genitalia    MDM: Pt with dysuria x 1 day, check for UTI, labs, reassess. Abigail Hammer DO     I performed a history and physical exam of the patient and discussed their management with the advanced care provider. I reviewed the advanced care provider's note and agree with the documented findings and plan of care. My medical decision making and objective findings are found above.

## 2020-10-22 NOTE — H&P ADULT - HISTORY OF PRESENT ILLNESS
85 year old male from [facility] with hx of anxiety, bipolar, depression, hypothyroidism, asthma/copd, idiopathic thrombocytopenia who presents c/o urinary burning and discomfort since yesterday. 85 year old male from [facility] with hx of anxiety, bipolar, depression, hypothyroidism, asthma/copd, idiopathic thrombocytopenia who presents c/o urinary burning and discomfort since yesterday.        85 year old male from [facility] with hx of anxiety, bipolar, depression, hypothyroidism, asthma/copd, idiopathic thrombocytopenia who presents c/o urinary burning, discomfort and pain when urinating since yest. Pt reports he cannot tolerate the pain he feels, he has been holding in his urine bc of how uncomfortable he feels. Aside from this reports he is having some abdominal discomfort in the LLQ but has not noted any symptoms with diarrhea/ N/V. He also states he has been feeling more weak with decreased po intake. Pt is AAOx 2, person and place, cannot recall the year but does know that Ike is president. Patient denies chest pain, SOB,  N/V, fever, chills, or any other complaints. All remainder ROS negative.

## 2020-10-22 NOTE — ED ADULT NURSE NOTE - NSIMPLEMENTINTERV_GEN_ALL_ED
Implemented All Universal Safety Interventions:  [place] to call system. Call bell, personal items and telephone within reach. Instruct patient to call for assistance. Room bathroom lighting operational. Non-slip footwear when patient is off stretcher. Physically safe environment: no spills, clutter or unnecessary equipment. Stretcher in lowest position, wheels locked, appropriate side rails in place.

## 2020-10-22 NOTE — ED PROVIDER NOTE - OBJECTIVE STATEMENT
Patient is a 85 year old male who presents c/o urinary burning and discomfort since yesterday. patient from [facility], sent by staff  patient denies fevers, no chills.  patient denies abdominal pain. no n/v  no testicular tenderness  patient with hx of dementia, is answering questions at bedside

## 2020-10-22 NOTE — ED ADULT NURSE NOTE - CARDIO ASSESSMENT
Pt required administration of pain medication throughout shift for headache and  of tongue. Pt has some relief and plan to add percocet tid prn. Provided comfort measures. Pt receptive. No injuries sustained. Will maintain safety at all times.  Ambualted x2 to rest room tolerated well required minial assistance ---

## 2020-10-22 NOTE — H&P ADULT - NSICDXPASTMEDICALHX_GEN_ALL_CORE_FT
Pancreatic lesion PAST MEDICAL HISTORY:  Anxiety disorder     Chronic obstructive pulmonary disease (COPD)     Dementia     Former smoker     Hypothyroidism     Idiopathic thrombocytopenia     Major depression      PAST MEDICAL HISTORY:  Anxiety and depression     Anxiety disorder     Chronic obstructive pulmonary disease (COPD)     Dementia     Former smoker     Hypothyroidism     Idiopathic thrombocytopenia     Major depression

## 2020-10-22 NOTE — ED ADULT TRIAGE NOTE - CHIEF COMPLAINT QUOTE
pt c/o burning with urination x 1 week increased frequency and increased urge to urinate. pt states "it burns when I pee" main complaint of burning while urinating  pt offers no other complaints at this time. from atul in [facility].

## 2020-10-22 NOTE — ED ADULT NURSE REASSESSMENT NOTE - NS ED NURSE REASSESS COMMENT FT1
pt status unchanged, refer to flowsheet and chart, pt safety maintained, pt hemodynamically stable. Pt report given to RN in holding room. Pt POC explained and verbalized understanding. Pt awaiting US/admit bed.

## 2020-10-22 NOTE — ED PROVIDER NOTE - PMH
Anxiety disorder    Chronic obstructive pulmonary disease (COPD)    Dementia    Former smoker    Hypothyroidism    Idiopathic thrombocytopenia    Major depression

## 2020-10-22 NOTE — H&P ADULT - ASSESSMENT
85 year old male from [facility] with hx of anxiety, bipolar, depression, hypothyroidism, asthma/copd, idiopathic thrombocytopenia who presents c/o urinary burning and discomfort since yesterday.       Acute  diverticulitis of the proximal sigmoid colon    - noted LLQ tenderness on exam   - afebrile  - leukocytosis 28     CT abd/plvis noted: Asymmetric enlargement of the prostate gland with a low density within this portion of the prostate. Considering the patient's history, a prostatic abscess must be considered. A sonogram of the bladder/prostate is recommended.  -c/w rocephin and flagyl D#1   -c/w florastor po bid   - ID consulted     Chronic kidney disease III   - baseline cr: 1.5  currently cr 1.3   - holding lasix    - avoid nephrotoxic medications  - c/w monitoring bun/cr closely     Hypothyroidism   -f/u tsh   -c/w synthroid po qd     Bipolar disorder / Anxiety   - c/w escitalopram, bupropion, and quetiapine   - prior to discharge pt needs psych evaluation prior to returning to [facility]     DVT ppx    Activity level:    Dispo:       Hypomagnesemia - Magnesium previously supplemented with improvement.       85 year old male from [facility] with hx of anxiety, bipolar, depression, hypothyroidism, asthma/copd, idiopathic thrombocytopenia who presents c/o urinary burning and discomfort since yesterday.       Acute  diverticulitis of the proximal sigmoid colon    - noted LLQ tenderness on exam   - afebrile  - leukocytosis 28     CT abd/plvis noted: Asymmetric enlargement of the prostate gland with a low density within this portion of the prostate. Considering the patient's history, a prostatic abscess must be considered. A sonogram of the bladder/prostate is recommended.  -c/w rocephin and flagyl D#1   -c/w florastor po bid   - ID consulted     UTI  afebrile  - leukocytosis   -c/w rocephin   -c/w florastor po bid     Chronic kidney disease III   - baseline cr: 1.5  currently cr 1.3   - holding lasix    - avoid nephrotoxic medications  - c/w monitoring bun/cr closely     Hypothyroidism   -f/u tsh   -c/w synthroid po qd     Bipolar disorder / Anxiety   - c/w escitalopram, bupropion, and quetiapine   - prior to discharge pt needs psych evaluation prior to returning to [facility]     DVT ppx    Activity level:    Dispo:       Hypomagnesemia - Magnesium previously supplemented with improvement. 85 year old male from [facility] with hx of anxiety, bipolar, depression, hypothyroidism, asthma/copd, idiopathic thrombocytopenia who presents c/o dysuria, UA positive and noted with UTI. Also noted with LLQ abd pain and ct abd/pelvis showing evidence of mild acute diverticulitis, also noted with possible prostate abscess.     Admit to any bed        Acute  diverticulitis of the proximal sigmoid colon    - noted LLQ tenderness on exam   - no evidence of diarrhea/ rectal bleeding/ N/V   - afebrile  - leukocytosis 28  down trended to 24 on repeat labs   - ct abd/pelvis reviewed  -c/w rocephin and flagyl D#1   -c/w florastor po bid   - ID consulted   - post tx and 4-6 weeks later pt would benefit from colonoscopy outpt     Asymmetric enlargement of the prostate   - noted on ct abd/pelvis with asymmetric enlargement of the prostate gland with a low density within this portion of the prostate. Possible prostate abscess  - f/u prostate US   -ID consulted  - will consult urology based on prostate US results     UTI  afebrile  - leukocytosis 28 down trended to 24  - pt with noted symptoms of burning with urination,  no noted discharge  - concern for possible acute urinary retention   - UA positive  - f/u U cx    - bladder scan q8hrs and straight cath x 2 if >350ml thereafter if pt retains lester to be inserted. Flomax started.   -c/w rocephin   -c/w florastor po bid     Chronic kidney disease III   - baseline cr: 1.5  currently cr 1.3   - holding lasix    - avoid nephrotoxic medications  - c/w monitoring bun/cr closely     COPD  - stable not in an exacerbation  - c/w duoneb q6hrs prn   -c/w albuterol prn     Hypothyroidism   -f/u tsh   -c/w synthroid po qd     Bipolar disorder / Anxiety   - c/w escitalopram, bupropion, and quetiapine   - prior to discharge pt needs psych evaluation prior to returning to [facility]     DVT ppx  -c/w heparin sq     Activity level: increase as tolerated  Baseline ambulates independently and sometimes uses a walker     Dispo: Pt is from [facility], will need PT and psych evaluation to return. Anticipated los likely 2-3 days pending cx results/ sensitivity.      85 year old male from [facility] with hx of anxiety, bipolar, depression, hypothyroidism, asthma/copd, idiopathic thrombocytopenia who presents c/o dysuria, UA positive and noted with UTI. Also noted with LLQ abd pain and ct abd/pelvis showing evidence of mild acute diverticulitis, also noted with possible prostate abscess.     Admit to any bed        Acute  diverticulitis of the proximal sigmoid colon    - noted LLQ tenderness on exam   - no evidence of diarrhea/ rectal bleeding/ N/V   - afebrile  - leukocytosis 28  down trended to 24 on repeat labs   - ct abd/pelvis reviewed  -c/w rocephin and flagyl D#1   -c/w florastor po bid   - ID consulted   - post tx and 4-6 weeks later pt would benefit from colonoscopy outpt     Asymmetric enlargement of the prostate   - noted on ct abd/pelvis with asymmetric enlargement of the prostate gland with a low density within this portion of the prostate. Possible prostate abscess  - f/u prostate US   -f/u PSA level  -ID consulted  - will consult urology based on prostate US results     UTI  afebrile  - leukocytosis 28 down trended to 24 (pt was also on steroids at the facility)   - pt with noted symptoms of burning with urination,  no noted discharge  - concern for possible acute urinary retention   - UA positive  - f/u U cx    - bladder scan q8hrs and straight cath x 2 if >350ml thereafter if pt retains lester to be inserted. Flomax started.   -c/w rocephin   -c/w florastor po bid     Chronic kidney disease III   - baseline cr: 1.5  currently cr 1.3   - holding lasix    - avoid nephrotoxic medications  - c/w monitoring bun/cr closely     COPD  - stable not in an exacerbation  - recently discharge 10/14 s/p copd exacerbation. Pt is [facility] patient.   -pt was on prednisone taper at the facility, currently stable will hold off on any further steroids.   - c/w duoneb q6hrs routine   -c/w albuterol prn     Hypothyroidism   -f/u tsh   -c/w synthroid po qd     Bipolar disorder / Anxiety   - c/w escitalopram, bupropion, and quetiapine   - prior to discharge pt needs psych evaluation prior to returning to [facility]     DVT ppx  -c/w heparin sq     Activity level: increase as tolerated  Baseline ambulates independently and sometimes uses a walker     Dispo: Pt is from [facility], will need PT and psych evaluation to return. Anticipated los likely 2-3 days pending cx results/ sensitivity.      85 year old male from [facility] with hx of anxiety, bipolar, depression, hypothyroidism, asthma/copd, idiopathic thrombocytopenia who presents c/o dysuria, UA positive and noted with UTI. Also noted with LLQ abd pain and ct abd/pelvis showing evidence of mild acute diverticulitis, also noted with possible prostate abscess.     Admit to any bed        Acute  diverticulitis of the proximal sigmoid colon    - noted LLQ tenderness on exam   - no evidence of diarrhea/ rectal bleeding/ N/V   - afebrile  - leukocytosis 28  down trended to 24 on repeat labs   -f/u B cx   - ct abd/pelvis reviewed  -c/w rocephin and flagyl D#1   -c/w florastor po bid   - ID consulted   - post tx and 4-6 weeks later pt would benefit from colonoscopy outpt     Asymmetric enlargement of the prostate   - noted on ct abd/pelvis with asymmetric enlargement of the prostate gland with a low density within this portion of the prostate. Possible prostate abscess  - f/u prostate US   -f/u PSA level  -ID consulted  - will consult urology based on prostate US results     UTI  afebrile  - leukocytosis 28 down trended to 24 (pt was also on steroids at the facility)   - pt with noted symptoms of burning with urination,  no noted discharge  - concern for possible acute urinary retention   - UA positive  - f/u U cx    - bladder scan q8hrs and straight cath x 2 if >350ml thereafter if pt retains lester to be inserted. Flomax started.   -c/w rocephin   -c/w florastor po bid     Chronic kidney disease III   - baseline cr: 1.5  currently cr 1.3   - holding lasix    - avoid nephrotoxic medications  - c/w monitoring bun/cr closely     COPD  - stable not in an exacerbation  - recently discharge 10/14 s/p copd exacerbation. Pt is [facility] patient.   -pt was on prednisone taper at the facility, currently stable will hold off on any further steroids.   - c/w duoneb q6hrs routine   -c/w albuterol prn     Hypothyroidism   -f/u tsh   -c/w synthroid po qd     Bipolar disorder / Anxiety   - c/w escitalopram, bupropion, and quetiapine   - pt at baseline is forgetful, unclear hx of dementia aaox2-3   - prior to discharge pt needs psych evaluation prior to returning to [facility]     DVT ppx  -c/w heparin sq     Activity level: increase as tolerated  Baseline ambulates independently and sometimes uses a walker     Advanced directive: Full code - as per facility paper work and patient himself states this   Next of kin: Wife and Son Kleber     Dispo: Pt is from [facility], will need PT and psych evaluation to return. Anticipated los likely 2-3 days pending cx results/ sensitivity.      85 year old male from [facility] with hx of anxiety, bipolar, depression, hypothyroidism, asthma/copd, idiopathic thrombocytopenia who presents c/o dysuria, UA positive and noted with UTI. Also noted with LLQ abd pain and ct abd/pelvis showing evidence of mild acute diverticulitis, also noted with possible prostate abscess.     Admit to any bed        Acute  diverticulitis of the proximal sigmoid colon    - noted LLQ tenderness on exam   - no evidence of diarrhea/ rectal bleeding/ N/V   - afebrile  - leukocytosis 28  down trended to 24 on repeat labs    - elevated lactate 4 down trended to 2   -f/u B cx   - ct abd/pelvis reviewed  -c/w rocephin and flagyl D#1   -c/w florastor po bid   - ID consulted   - post tx and 4-6 weeks later pt would benefit from colonoscopy outpt     Asymmetric enlargement of the prostate   - noted on ct abd/pelvis with asymmetric enlargement of the prostate gland with a low density within this portion of the prostate. Possible prostate abscess  - f/u prostate US   -f/u PSA level  -ID consulted  - will consult urology based on prostate US results     UTI  afebrile  - leukocytosis 28 down trended to 24 (pt was also on steroids at the facility)   - pt with noted symptoms of burning with urination,  no noted discharge  - concern for possible acute urinary retention   - UA positive  - f/u U cx    - bladder scan q8hrs and straight cath x 2 if >350ml thereafter if pt retains lester to be inserted. Flomax started.   -c/w rocephin   -c/w florastor po bid     Chronic kidney disease III   - baseline cr: 1.5  currently cr 1.3   - holding lasix    - avoid nephrotoxic medications  - c/w monitoring bun/cr closely     COPD  - stable not in an exacerbation  - recently discharge 10/14 s/p copd exacerbation. Pt is [facility] patient.   -pt was on prednisone taper at the facility, currently stable will hold off on any further steroids.   - c/w duoneb q6hrs routine   -c/w albuterol prn     Hypothyroidism   -f/u tsh   -c/w synthroid po qd     Bipolar disorder / Anxiety   - c/w escitalopram, bupropion, and quetiapine   - pt at baseline is forgetful, unclear hx of dementia aaox2-3   - prior to discharge pt needs psych evaluation prior to returning to [facility]     DVT ppx  -c/w heparin sq     Activity level: increase as tolerated  Baseline ambulates independently and sometimes uses a walker     Advanced directive: Full code - as per facility paper work and patient himself states this   Next of kin: Wife and Son Kleber     Dispo: Pt is from [facility], will need PT and psych evaluation to return. Anticipated los likely 2-3 days pending cx results/ sensitivity.

## 2020-10-22 NOTE — ED ADULT NURSE NOTE - OBJECTIVE STATEMENT
Pt c/o burning on urination and frequency. Pt presents from [facility] in [place] and states the onset of symptoms was yesterday. Pt denies any abd pain, cp, testicular tenderness, N/V/D.

## 2020-10-23 DIAGNOSIS — F41.9 ANXIETY DISORDER, UNSPECIFIED: ICD-10-CM

## 2020-10-23 DIAGNOSIS — F03.91 UNSPECIFIED DEMENTIA WITH BEHAVIORAL DISTURBANCE: ICD-10-CM

## 2020-10-23 DIAGNOSIS — F33.41 MAJOR DEPRESSIVE DISORDER, RECURRENT, IN PARTIAL REMISSION: ICD-10-CM

## 2020-10-23 LAB
ANION GAP SERPL CALC-SCNC: 11 MMOL/L — SIGNIFICANT CHANGE UP (ref 5–17)
BASOPHILS # BLD AUTO: 0.01 K/UL — SIGNIFICANT CHANGE UP (ref 0–0.2)
BASOPHILS NFR BLD AUTO: 0.1 % — SIGNIFICANT CHANGE UP (ref 0–2)
BUN SERPL-MCNC: 22 MG/DL — HIGH (ref 8–20)
CALCIUM SERPL-MCNC: 8.1 MG/DL — LOW (ref 8.6–10.2)
CHLORIDE SERPL-SCNC: 103 MMOL/L — SIGNIFICANT CHANGE UP (ref 98–107)
CO2 SERPL-SCNC: 25 MMOL/L — SIGNIFICANT CHANGE UP (ref 22–29)
CREAT SERPL-MCNC: 1.28 MG/DL — SIGNIFICANT CHANGE UP (ref 0.5–1.3)
EOSINOPHIL # BLD AUTO: 0.17 K/UL — SIGNIFICANT CHANGE UP (ref 0–0.5)
EOSINOPHIL NFR BLD AUTO: 1.3 % — SIGNIFICANT CHANGE UP (ref 0–6)
GLUCOSE SERPL-MCNC: 77 MG/DL — SIGNIFICANT CHANGE UP (ref 70–99)
HCT VFR BLD CALC: 31.1 % — LOW (ref 39–50)
HGB BLD-MCNC: 9.6 G/DL — LOW (ref 13–17)
IMM GRANULOCYTES NFR BLD AUTO: 0.7 % — SIGNIFICANT CHANGE UP (ref 0–1.5)
LYMPHOCYTES # BLD AUTO: 1.18 K/UL — SIGNIFICANT CHANGE UP (ref 1–3.3)
LYMPHOCYTES # BLD AUTO: 9 % — LOW (ref 13–44)
MCHC RBC-ENTMCNC: 29.4 PG — SIGNIFICANT CHANGE UP (ref 27–34)
MCHC RBC-ENTMCNC: 30.9 GM/DL — LOW (ref 32–36)
MCV RBC AUTO: 95.1 FL — SIGNIFICANT CHANGE UP (ref 80–100)
MONOCYTES # BLD AUTO: 0.96 K/UL — HIGH (ref 0–0.9)
MONOCYTES NFR BLD AUTO: 7.3 % — SIGNIFICANT CHANGE UP (ref 2–14)
NEUTROPHILS # BLD AUTO: 10.73 K/UL — HIGH (ref 1.8–7.4)
NEUTROPHILS NFR BLD AUTO: 81.6 % — HIGH (ref 43–77)
PLATELET # BLD AUTO: 143 K/UL — LOW (ref 150–400)
POTASSIUM SERPL-MCNC: 4.2 MMOL/L — SIGNIFICANT CHANGE UP (ref 3.5–5.3)
POTASSIUM SERPL-SCNC: 4.2 MMOL/L — SIGNIFICANT CHANGE UP (ref 3.5–5.3)
PSA FREE FLD-MCNC: 0.06 NG/ML — SIGNIFICANT CHANGE UP
PSA FREE FLD-MCNC: 9 % — SIGNIFICANT CHANGE UP
PSA SERPL-MCNC: 0.7 NG/ML — SIGNIFICANT CHANGE UP (ref 0–4)
RBC # BLD: 3.27 M/UL — LOW (ref 4.2–5.8)
RBC # FLD: 17.3 % — HIGH (ref 10.3–14.5)
SODIUM SERPL-SCNC: 139 MMOL/L — SIGNIFICANT CHANGE UP (ref 135–145)
WBC # BLD: 13.14 K/UL — HIGH (ref 3.8–10.5)
WBC # FLD AUTO: 13.14 K/UL — HIGH (ref 3.8–10.5)

## 2020-10-23 PROCEDURE — 99222 1ST HOSP IP/OBS MODERATE 55: CPT

## 2020-10-23 PROCEDURE — 99223 1ST HOSP IP/OBS HIGH 75: CPT

## 2020-10-23 PROCEDURE — 99233 SBSQ HOSP IP/OBS HIGH 50: CPT

## 2020-10-23 RX ORDER — TAMSULOSIN HYDROCHLORIDE 0.4 MG/1
0.4 CAPSULE ORAL AT BEDTIME
Refills: 0 | Status: DISCONTINUED | OUTPATIENT
Start: 2020-10-23 | End: 2020-10-27

## 2020-10-23 RX ADMIN — QUETIAPINE FUMARATE 25 MILLIGRAM(S): 200 TABLET, FILM COATED ORAL at 21:03

## 2020-10-23 RX ADMIN — Medication 250 MILLIGRAM(S): at 17:49

## 2020-10-23 RX ADMIN — ESCITALOPRAM OXALATE 10 MILLIGRAM(S): 10 TABLET, FILM COATED ORAL at 11:06

## 2020-10-23 RX ADMIN — Medication 250 MILLIGRAM(S): at 04:48

## 2020-10-23 RX ADMIN — TAMSULOSIN HYDROCHLORIDE 0.4 MILLIGRAM(S): 0.4 CAPSULE ORAL at 21:03

## 2020-10-23 RX ADMIN — BUPROPION HYDROCHLORIDE 150 MILLIGRAM(S): 150 TABLET, EXTENDED RELEASE ORAL at 11:06

## 2020-10-23 RX ADMIN — PREGABALIN 1000 MICROGRAM(S): 225 CAPSULE ORAL at 11:06

## 2020-10-23 RX ADMIN — Medication 137 MICROGRAM(S): at 04:48

## 2020-10-23 RX ADMIN — Medication 3 MILLILITER(S): at 20:38

## 2020-10-23 RX ADMIN — Medication 25 MILLIGRAM(S): at 04:48

## 2020-10-23 RX ADMIN — Medication 500 MILLIGRAM(S): at 21:03

## 2020-10-23 RX ADMIN — Medication 25 MILLIGRAM(S): at 17:49

## 2020-10-23 RX ADMIN — Medication 81 MILLIGRAM(S): at 11:06

## 2020-10-23 RX ADMIN — CEFTRIAXONE 100 MILLIGRAM(S): 500 INJECTION, POWDER, FOR SOLUTION INTRAMUSCULAR; INTRAVENOUS at 11:07

## 2020-10-23 RX ADMIN — Medication 5 MILLIGRAM(S): at 21:03

## 2020-10-23 RX ADMIN — HEPARIN SODIUM 5000 UNIT(S): 5000 INJECTION INTRAVENOUS; SUBCUTANEOUS at 17:49

## 2020-10-23 RX ADMIN — Medication 500 MILLIGRAM(S): at 11:06

## 2020-10-23 RX ADMIN — Medication 500 MILLIGRAM(S): at 04:48

## 2020-10-23 RX ADMIN — Medication 3 MILLILITER(S): at 15:30

## 2020-10-23 NOTE — PHYSICAL THERAPY INITIAL EVALUATION ADULT - LIGHT TOUCH SENSATION, RUE, REHAB EVAL
The patient has been examined and the H&P has been reviewed:    I concur with the findings and no changes have occurred since H&P was written.    Anesthesia/Surgery risks, benefits and alternative options discussed and understood by patient/family.          Active Hospital Problems    Diagnosis  POA    Closed fracture of right hand [S62.91XA]  Yes      Resolved Hospital Problems   No resolved problems to display.      within normal limits

## 2020-10-23 NOTE — BEHAVIORAL HEALTH ASSESSMENT NOTE - NSBHCONSULTFOLLOWAFTERCARE_PSY_A_CORE FT
Continue current psychiatric medication regimen above and add Seroquel 12.5 mg PO as needed for severe agitation

## 2020-10-23 NOTE — BEHAVIORAL HEALTH ASSESSMENT NOTE - HPI (INCLUDE ILLNESS QUALITY, SEVERITY, DURATION, TIMING, CONTEXT, MODIFYING FACTORS, ASSOCIATED SIGNS AND SYMPTOMS)
Patient is an 88 YOM with history of dementia, anxiety, MDD, COPD, ITP and hypothyroidism admitted under medicine for UTI and diverticulitis. Psychiatry consult requested by Dr. Gibbs, [facility] resident physician, before patient returns to [facility]. Contacted Dr. Gibbs who states the patient is normally accepting of his [facility] residence and calm but has episodes where he becomes agitated, disruptive, accusing his family of stealing his money and requesting to leave [facility]; Dr. Gibbs is requesting psychiatric evaluation before patient returns to make sure "he is okay;" states patient is alert and oriented x2-3 at baseline. Mental status, orientation and memory of patient during interview are consistent with his reported baseline. During the interview, the patient states he "does not need psychiatry," denies a psychiatric history aside from chronic anxiety which is increased due to his hospital stay, denies psychiatric medications, AHVH, SI, HI, manic symptoms. Patient admits to drinking "one bottle of whiskey per week" but denies smoking, illicit drug use or marijuana use. Mini-Mental Status Exam performed at bedside with score of 20 indicating mild impairment.

## 2020-10-23 NOTE — BEHAVIORAL HEALTH ASSESSMENT NOTE - SUMMARY
Patient is an 88 YOM with history of COPD, ITP, hypothyroidism, anxiety, MDD and dementia admitted under medicine for UTI and diverticulitis. Psychiatric evaluation requested by Dr. Gibbs, [facility] resident physician, before patient returns to [facility]. Patient states "I don't need psychiatry," denies psychiatric history other than anxiety, denies psychiatric medication, SI, HI and AHVH. Patient is at baseline mentation as reported by Dr. Gibbs and is appropriate for discharge back to [facility]. Recommend continuing current psychiatric medication regimen and can administer Seroquel 12.5 mg PO prn for severe agitation.

## 2020-10-23 NOTE — PROGRESS NOTE ADULT - SUBJECTIVE AND OBJECTIVE BOX
HPI: CTSP for help with placing a difficult lester s/p RN failed to ne able to do so and for BPH and ? prostatic abscess on CT.  This is an 88M PMHx mild dementia, bipolar disorder, anxiety, depression, former smoker, COPD, HTN, hypothyroidism, ITP, Aortic dissection s/p repair sent from [facility] for burning on urination x 2 days.  Pt somewhat confused but states 2 day hx of burning, increased urgency and difficulty voiding.   He denies fevers, chills, no foul smell to urine, no hematuria or previous problems urinating or prior UTI.  He does endorse + nocturia 2-3 times per night " for a long time now".  Denies any other complaints at this time.    ROS: negative except for above    PMHx: mild dementia, bipolar disorder, anxiety, depression, former smoker, COPD, HTN, hypothyroidism, ITP, Aortic dissection s/p repair  PSHx: Endovascular Aortic repair, Cholecysectomy  FamHx: non-contributory  Allergies: PCN-rash/hives           MEDICATIONS  (STANDING):  albuterol/ipratropium for Nebulization 3 milliLiter(s) Nebulizer every 6 hours  aspirin  chewable 81 milliGRAM(s) Oral daily  buPROPion XL . 150 milliGRAM(s) Oral daily  cefTRIAXone   IVPB 1000 milliGRAM(s) IV Intermittent every 24 hours  cyanocobalamin 1000 MICROGram(s) Oral daily  escitalopram 10 milliGRAM(s) Oral daily  heparin   Injectable 5000 Unit(s) SubCutaneous every 12 hours  levothyroxine 137 MICROGram(s) Oral daily  melatonin 5 milliGRAM(s) Oral at bedtime  metoprolol tartrate 25 milliGRAM(s) Oral two times a day  metroNIDAZOLE    Tablet 500 milliGRAM(s) Oral every 8 hours  QUEtiapine 25 milliGRAM(s) Oral at bedtime  saccharomyces boulardii 250 milliGRAM(s) Oral two times a day  tamsulosin 0.4 milliGRAM(s) Oral at bedtime    MEDICATIONS  (PRN):  ALBUTerol    90 MICROgram(s) HFA Inhaler 2 Puff(s) Inhalation every 6 hours PRN Shortness of Breath  guaiFENesin   Syrup  (Sugar-Free) 100 milliGRAM(s) Oral every 6 hours PRN Cough      Vital Signs Last 24 Hrs  T(C): 36.6 (23 Oct 2020 11:10), Max: 36.7 (23 Oct 2020 04:28)  T(F): 97.8 (23 Oct 2020 11:10), Max: 98.1 (23 Oct 2020 04:28)  HR: 72 (23 Oct 2020 11:10) (62 - 85)  BP: 107/65 (23 Oct 2020 11:10) (107/65 - 128/78)  BP(mean): --  RR: 18 (23 Oct 2020 11:10) (16 - 18)  SpO2: 98% (23 Oct 2020 11:10) (94% - 99%)    PHYSICAL EXAM:      Constitutional: NAD, mild confusion    Respiratory: no accessory muscle use or conversational dyspnea    Cardiovascular: S1S2    Gastrointestinal: mild    Genitourinary: normal appearing external genitalia    Rectal: + firm, smooth and enlarged prostate.  Non-tender          I&O's Detail    22 Oct 2020 07:01  -  23 Oct 2020 07:00  --------------------------------------------------------  IN:  Total IN: 0 mL    OUT:    Voided (mL): 300 mL  Total OUT: 300 mL    Total NET: -300 mL      23 Oct 2020 07:01  -  23 Oct 2020 14:07  --------------------------------------------------------  IN:  Total IN: 0 mL    OUT:    Indwelling Catheter - Urethral (mL): 400 mL    Voided (mL): 140 mL  Total OUT: 540 mL    Total NET: -540 mL          LABS:                        9.6    13.14 )-----------( 143      ( 23 Oct 2020 07:12 )             31.1     10-23    139  |  103  |  22.0<H>  ----------------------------<  77  4.2   |  25.0  |  1.28    Ca    8.1<L>      23 Oct 2020 07:12    TPro  6.4<L>  /  Alb  3.4  /  TBili  0.7  /  DBili  x   /  AST  16  /  ALT  15  /  AlkPhos  67  10-22      Urinalysis Basic - ( 22 Oct 2020 11:56 )    Color: Yellow / Appearance: Clear / S.010 / pH: x  Gluc: x / Ketone: Negative  / Bili: Negative / Urobili: Negative mg/dL   Blood: x / Protein: Negative mg/dL / Nitrite: Negative   Leuk Esterase: Moderate / RBC: Negative /HPF / WBC 6-10   Sq Epi: x / Non Sq Epi: Negative / Bacteria: Occasional        RADIOLOGY & ADDITIONAL STUDIES:

## 2020-10-23 NOTE — BEHAVIORAL HEALTH ASSESSMENT NOTE - NSBHCHARTREVIEWVS_PSY_A_CORE FT
Vital Signs Last 24 Hrs  T(C): 36.6 (23 Oct 2020 11:10), Max: 36.7 (23 Oct 2020 04:28)  T(F): 97.8 (23 Oct 2020 11:10), Max: 98.1 (23 Oct 2020 04:28)  HR: 72 (23 Oct 2020 11:10) (62 - 85)  BP: 107/65 (23 Oct 2020 11:10) (107/65 - 128/78)  RR: 18 (23 Oct 2020 11:10) (16 - 18)  SpO2: 98% (23 Oct 2020 11:10) (94% - 99%)

## 2020-10-23 NOTE — BEHAVIORAL HEALTH ASSESSMENT NOTE - PAST PSYCHOTROPIC MEDICATION
Ativan 0.5 mg PO BID  Seroquel 25 mg PO BID  Buproprion  mg PO once daily  Melatonin 10 mg PO once daily  Lexapro 10 mg PO once daily

## 2020-10-23 NOTE — BEHAVIORAL HEALTH ASSESSMENT NOTE - NSBHCHARTREVIEWIMAGING_PSY_A_CORE FT
< from: CT Abdomen and Pelvis No Cont (10.22.20 @ 14:02) >    Evidence of mild diverticulitis of the proximal sigmoid colon without evidence of associated abscess.    Asymmetric enlargement of the prostate gland with a low density within this portion of the prostate. Considering the patient's history, a prostatic abscess must be considered. A sonogram of the bladder/prostate is recommended.    < end of copied text >

## 2020-10-23 NOTE — CONSULT NOTE ADULT - ASSESSMENT
88y  Male from [facility] with hx of anxiety, bipolar, depression, hypothyroidism, asthma/copd, idiopathic thrombocytopenia who presents to the ER with c/o urinary burning, discomfort and pain when urinating. He reports he cannot tolerate the pain he feels, he has been holding in his urine due to how uncomfortable he feels. He is having associated abdominal discomfort in the LLQ but has not noted any symptoms with diarrhea/ N/V. He also states he has been feeling more weakness with decreased po intake. Pt is AAOx 2, person, year and hospital, He is unaware of how he came to the ER. In the ER patient is afebrile, leukocytosis to 28k. Started on flagyl and Ceftriaxone.      Acute diverticulitis  r/o Prostate abscess  Urinary retention   s/p Soto  Leukocytosis   PCN allergy       - Blood cultures pending  - Urine culture pending  - covid 19 PCR negative   - CT A/P reporitng Acute diverticulitis   - U/S prostate unable to r/o prostate abscess  - Ordered transurethral prostate U/S  - Urology eval  - UA not suggestive of UTI  - Has unknown PCN allergy, tolerating Ceftriaxone   - Continue Ceftriaxone  - Continue Flagyl   - Trend Fever  - Trend Leukocytosis      Will Follow     88y  Male from [facility] with hx of anxiety, bipolar, depression, hypothyroidism, asthma/copd, idiopathic thrombocytopenia who presents to the ER with c/o urinary burning, discomfort and pain when urinating. He reports he cannot tolerate the pain he feels, he has been holding in his urine due to how uncomfortable he feels. He is having associated abdominal discomfort in the LLQ but has not noted any symptoms with diarrhea/ N/V. He also states he has been feeling more weakness with decreased po intake. Pt is AAOx 2, person, year and hospital, He is unaware of how he came to the ER. In the ER patient is afebrile, leukocytosis to 28k. Started on flagyl and Ceftriaxone.      Acute diverticulitis  r/o Prostate abscess  Urinary retention   s/p Soto  Leukocytosis   PCN allergy       - Blood cultures pending  - Urine culture pending  - covid 19 PCR negative   - CT A/P reporitng Acute diverticulitis   - U/S prostate unable to r/o prostate abscess  - d/w Radiology no reason to do transrectal prostate US or MRI, more likely BPH vs prostate abscess  - Urology eval  - UA not suggestive of UTI  - Has unknown PCN allergy, tolerating Ceftriaxone   - Continue Ceftriaxone  - Continue Flagyl   - Trend Fever  - Trend Leukocytosis      Will Follow

## 2020-10-23 NOTE — BEHAVIORAL HEALTH ASSESSMENT NOTE - NSBHCHARTREVIEWLAB_PSY_A_CORE FT
9.6    13.14 )-----------( 143      ( 23 Oct 2020 07:12 )             31.1   Urinalysis Basic - ( 22 Oct 2020 11:56 )    Color: Yellow / Appearance: Clear / S.010 / pH: x  Gluc: x / Ketone: Negative  / Bili: Negative / Urobili: Negative mg/dL   Blood: x / Protein: Negative mg/dL / Nitrite: Negative   Leuk Esterase: Moderate / RBC: Negative /HPF / WBC 6-10   Sq Epi: x / Non Sq Epi: Negative / Bacteria: Occasional

## 2020-10-23 NOTE — PHYSICAL THERAPY INITIAL EVALUATION ADULT - CRITERIA FOR SKILLED THERAPEUTIC INTERVENTIONS
therapy frequency/rehab potential/anticipated discharge recommendation/predicted duration of therapy intervention/anticipated equipment needs at discharge/risk reduction/prevention/functional limitations in following categories/impairments found

## 2020-10-23 NOTE — PROGRESS NOTE ADULT - SUBJECTIVE AND OBJECTIVE BOX
[facility] Division of Hospital Medicine    Chief Complaint:  burning on urination    SUBJECTIVE / OVERNIGHT EVENTS: patient reports he continues to have burning when he urinates. No abdominal pain. no nausea. He is eating.     Patient denies chest pain, SOB, abd pain, N/V, fever, chills, dysuria or any other complaints. All remainder ROS negative.     MEDICATIONS  (STANDING):  albuterol/ipratropium for Nebulization 3 milliLiter(s) Nebulizer every 6 hours  aspirin  chewable 81 milliGRAM(s) Oral daily  buPROPion XL . 150 milliGRAM(s) Oral daily  cefTRIAXone   IVPB 1000 milliGRAM(s) IV Intermittent every 24 hours  cyanocobalamin 1000 MICROGram(s) Oral daily  escitalopram 10 milliGRAM(s) Oral daily  heparin   Injectable 5000 Unit(s) SubCutaneous every 12 hours  levothyroxine 137 MICROGram(s) Oral daily  melatonin 5 milliGRAM(s) Oral at bedtime  metoprolol tartrate 25 milliGRAM(s) Oral two times a day  metroNIDAZOLE    Tablet 500 milliGRAM(s) Oral every 8 hours  QUEtiapine 25 milliGRAM(s) Oral at bedtime  saccharomyces boulardii 250 milliGRAM(s) Oral two times a day  tamsulosin 0.4 milliGRAM(s) Oral at bedtime    MEDICATIONS  (PRN):  ALBUTerol    90 MICROgram(s) HFA Inhaler 2 Puff(s) Inhalation every 6 hours PRN Shortness of Breath  guaiFENesin   Syrup  (Sugar-Free) 100 milliGRAM(s) Oral every 6 hours PRN Cough        I&O's Summary    22 Oct 2020 07:01  -  23 Oct 2020 07:00  --------------------------------------------------------  IN: 0 mL / OUT: 300 mL / NET: -300 mL    23 Oct 2020 07:01  -  23 Oct 2020 10:39  --------------------------------------------------------  IN: 0 mL / OUT: 140 mL / NET: -140 mL        PHYSICAL EXAM:  Vital Signs Last 24 Hrs  T(C): 36.4 (23 Oct 2020 07:15), Max: 36.9 (22 Oct 2020 11:00)  T(F): 97.6 (23 Oct 2020 07:15), Max: 98.5 (22 Oct 2020 11:00)  HR: 70 (23 Oct 2020 09:30) (62 - 94)  BP: 115/70 (23 Oct 2020 07:15) (115/70 - 128/78)  BP(mean): --  RR: 18 (23 Oct 2020 07:15) (16 - 18)  SpO2: 96% (23 Oct 2020 09:30) (94% - 99%)        CONSTITUTIONAL: NAD, well-developed, well-groomed  ENMT: Moist oral mucosa, no pharyngeal injection or exudates  RESPIRATORY: Normal respiratory effort; lungs are clear to auscultation bilaterally  CARDIOVASCULAR: Regular rate and rhythm, normal S1 and S2, no murmur/rub/gallop; No lower extremity edema  ABDOMEN: Nontender to palpation, normoactive bowel sounds, no rebound/guarding; No hepatosplenomegaly  MUSCLOSKELETAL:  no clubbing or cyanosis of digits; no joint swelling or tenderness to palpation  PSYCH: A+O to person, place, and time; affect appropriate  NEUROLOGY: CN 2-12 are intact and symmetric; no gross sensory deficits;   SKIN: No rashes; no palpable lesions    LABS:                        9.6    13.14 )-----------( 143      ( 23 Oct 2020 07:12 )             31.1     10-    139  |  103  |  22.0<H>  ----------------------------<  77  4.2   |  25.0  |  1.28    Ca    8.1<L>      23 Oct 2020 07:12    TPro  6.4<L>  /  Alb  3.4  /  TBili  0.7  /  DBili  x   /  AST  16  /  ALT  15  /  AlkPhos  67  10-22          Urinalysis Basic - ( 22 Oct 2020 11:56 )    Color: Yellow / Appearance: Clear / S.010 / pH: x  Gluc: x / Ketone: Negative  / Bili: Negative / Urobili: Negative mg/dL   Blood: x / Protein: Negative mg/dL / Nitrite: Negative   Leuk Esterase: Moderate / RBC: Negative /HPF / WBC 6-10   Sq Epi: x / Non Sq Epi: Negative / Bacteria: Occasional        CAPILLARY BLOOD GLUCOSE            RADIOLOGY & ADDITIONAL TESTS:  Results Reviewed:   Imaging Personally Reviewed:  Electrocardiogram Personally Reviewed:

## 2020-10-23 NOTE — CONSULT NOTE ADULT - SUBJECTIVE AND OBJECTIVE BOX
[facility]  INFECTIOUS DISEASES AND INTERNAL MEDICINE at [place]  =======================================================  Emile Guthrie MD  Diplomates American Board of Internal Medicine and Infectious Diseases  Tel: [phone]      Fax: [phone]  =======================================================      [id number]  NAOMI BAXTER    History obtained from the chart. Patient is a poor historian     CC: Patient is a 88y old  Male who presents with a chief complaint of Burning on urination (23 Oct 2020 10:39)    88y  Male from [facility] with hx of anxiety, bipolar, depression, hypothyroidism, asthma/copd, idiopathic thrombocytopenia who presents to the ER with c/o urinary burning, discomfort and pain when urinating. He reports he cannot tolerate the pain he feels, he has been holding in his urine due to how uncomfortable he feels. He is having associated abdominal discomfort in the LLQ but has not noted any symptoms with diarrhea/ N/V. He also states he has been feeling more weakness with decreased po intake. Pt is AAOx 2, person, year and hospital, He is unaware of how he came to the ER. In the ER patient is afebrile, leukocytosis to 28k. Started on flagyl and Ceftriaxone. ID input requested.       Past Medical & Surgical Hx:  Anxiety and depression  Former smoker  Dementia  Chronic obstructive pulmonary disease (COPD)  Idiopathic thrombocytopenia  Hypothyroidism  Major depression  Anxiety disorder  S/P cholecystectomy  Aortic dissection repair      Social Hx:  Lives in assisted living facility       FAMILY HISTORY:  Patient is unable to recall.       Allergies  penicillin (Rash; Hives)  Intolerances      REVIEW OF SYSTEMS:  CONSTITUTIONAL:  No Fever or chills  HEENT:  No diplopia or blurred vision.  No earache, sore throat or runny nose.  CARDIOVASCULAR:  No pressure, squeezing, strangling, tightness, heaviness or aching about the chest, neck, axilla or epigastrium.  RESPIRATORY:  No cough, shortness of breath  GASTROINTESTINAL:  + nausea No vomiting or diarrhea.  GENITOURINARY:  + Soto   MUSCULOSKELETAL:  no joint aches, no muscle pain  SKIN:  No change in skin, hair or nails.  NEUROLOGIC:  No Headaches, seizures  PSYCHIATRIC:  No disorder of thought or mood.  ENDOCRINE:  No heat or cold intolerance  HEMATOLOGICAL:  No easy bruising or bleeding.       Physical Exam:  GEN: NAD, pleasant  HEENT: normocephalic and atraumatic. EOMI. PERRL.  Anicteric  NECK: Supple.   LUNGS: Clear to auscultation.  HEART: Regular rate and rhythm   ABDOMEN: Soft, nontender, and nondistended.  Positive bowel sounds.    : Soto  EXTREMITIES: Without any edema.  MSK: No joint swelling  NEUROLOGIC: No Focal Deficits  PSYCHIATRIC: Appropriate affect .  SKIN: No Rash      Vitals:  T(F): 97.8 (23 Oct 2020 11:10), Max: 98.1 (23 Oct 2020 04:28)  HR: 72 (23 Oct 2020 11:10)  BP: 107/65 (23 Oct 2020 11:10)  RR: 18 (23 Oct 2020 11:10)  SpO2: 98% (23 Oct 2020 11:10) (94% - 99%)  temp max in last 48H T(F): , Max: 98.5 (1022-20 @ 11:00)      Current Antibiotics:  cefTRIAXone   IVPB 1000 milliGRAM(s) IV Intermittent every 24 hours  metroNIDAZOLE    Tablet 500 milliGRAM(s) Oral every 8 hours    Other medications:  albuterol/ipratropium for Nebulization 3 milliLiter(s) Nebulizer every 6 hours  aspirin  chewable 81 milliGRAM(s) Oral daily  buPROPion XL . 150 milliGRAM(s) Oral daily  cyanocobalamin 1000 MICROGram(s) Oral daily  escitalopram 10 milliGRAM(s) Oral daily  heparin   Injectable 5000 Unit(s) SubCutaneous every 12 hours  levothyroxine 137 MICROGram(s) Oral daily  melatonin 5 milliGRAM(s) Oral at bedtime  metoprolol tartrate 25 milliGRAM(s) Oral two times a day  QUEtiapine 25 milliGRAM(s) Oral at bedtime  saccharomyces boulardii 250 milliGRAM(s) Oral two times a day  tamsulosin 0.4 milliGRAM(s) Oral at bedtime      Labs:             9.6    13.14 )-----------( 143      ( 23 Oct 2020 07:12 )             31.1      10-23    139  |  103  |  22.0<H>  ----------------------------<  77  4.2   |  25.0  |  1.28    Ca    8.1<L>      23 Oct 2020 07:12    TPro  6.4<L>  /  Alb  3.4  /  TBili  0.7  /  DBili  x   /  AST  16  /  ALT  15  /  AlkPhos  67  10-22      WBC Count: 13.14 K/uL (10-23-20 @ 07:12)  WBC Count: 24.68 K/uL (10-22-20 @ 19:18)  WBC Count: 28.32 K/uL (10-22-20 @ 12:15)    Creatinine, Serum: 1.28 mg/dL (10-23-20 @ 07:12)  Creatinine, Serum: 1.39 mg/dL (10-22-20 @ 12:15)    COVID- PCR: NotDetec (10-22-20 @ 13:58)  COVID- PCR: NotDetec (10-22-20 @ 11:56)  COVID- PCR: NotDetec (10-09-20 @ 01:23)    Urinalysis Basic - ( 22 Oct 2020 11:56 )    Color: Yellow / Appearance: Clear / S.010 / pH: x  Gluc: x / Ketone: Negative  / Bili: Negative / Urobili: Negative mg/dL   Blood: x / Protein: Negative mg/dL / Nitrite: Negative   Leuk Esterase: Moderate / RBC: Negative /HPF / WBC 6-10   Sq Epi: x / Non Sq Epi: Negative / Bacteria: Occasional          < from: Xray Chest 2 Views PA/Lat (10.22.20 @ 14:07) >  EXAM:  XR CHEST PA LAT 2V                          PROCEDURE DATE:  10/22/2020      INTERPRETATION:  PA and lateral chest radiographs    COMPARISON: 10/9/2020 chest radiograph.    CLINICAL INFORMATION: Leukocytosis. Assess for pneumonia..    FINDINGS:    The airway is midline.  There are no airspace consolidations.  There is no pleural effusion or pneumothorax.  The  heart is enlarged in transverse diameter. No hilar mass. Trachea midline.  Status post TEVAR procedure.   The visualized osseus structures are intact.    IMPRESSION:  Lung bases clear.  No acute radiographic cardiopulmonary pathology.    < end of copied text >      < from: CT Abdomen and Pelvis No Cont (10.22.20 @ 14:02) >   EXAM:  CT ABDOMEN AND PELVIS                          PROCEDURE DATE:  10/22/2020      INTERPRETATION:  CLINICAL INFORMATION: Leukocytosis and dysuria.    COMPARISON: None.    PROCEDURE:  CT of the Abdomen and Pelvis was performed without intravenous contrast.  Intravenous contrast: None.  Oral contrast: None.  Sagittal and coronal reformats were performed.    FINDINGS:    The lung bases are clear and there is no evidence of a pleural effusion.    The liver is unremarkable. Status post cholecystectomy.    The spleen, pancreas, and adrenal glands demonstrate no abnormality.    Left kidney demonstrates a normal noncontrast appearance without evidence of hydronephrosis. The right kidney demonstrates 2 cortical cysts including a large upper pole cyst measuring 6.1 cm. No evidence of right stone or hydronephrosis. No evidence of perinephric fat stranding on either side. No ureteral abnormality.    Postoperative changes involving the lower thoracic and upper abdominal aorta with a stent. Cannot evaluate stent integrity without IV contrast. Atherosclerotic changes of the mid and lower abdominal aorta and pelvic vessels.    No evidence of retroperitoneal or pelvic lymphadenopathy.    The prostate gland is enlarged. There is an asymmetric bulge in the left prostatic contour with a low density at this site. Further evaluation is recommended.    The bladder is unremarkable.    No evidence of ascites. Extensive colonic diverticulosis. Mildly hazy fat planes surrounding the proximal sigmoid colon consistent with diverticulitis in this location. No evidence of associated abscess. The appendix is visualized and is normal.    There is a small left inguinal hernia. The abdominal wall is otherwise unremarkable.    There are degenerative changes in the spine.    IMPRESSION:  Evidence of mild diverticulitis of the proximal sigmoid colon without evidence of associated abscess.    Asymmetric enlargement of the prostate gland with a low density within this portion of the prostate. Considering the patient's history, a prostatic abscess must be considered. A sonogram of the bladder/prostate is recommended.    < end of copied text >      < from: US Prostate, Non Rectal (10.22.20 @ 18:04) >  EXAM:  US PROSTATE NON RECTAL                          PROCEDURE DATE:  10/22/2020      INTERPRETATION:  HISTORY: Evaluate for prostate abscess is queried on 10/22/2020 CT scan 1:48 PM    TECHNIQUE: Transabdominal pelvic sonogram.    FINDINGS:    Prosthetic area measures 7.7 x 8.7 x 7.8 cm. The clarity of prostate images is inadequate to evaluate for prostate abscess or other pathologies.  Bladder is distended measuring 10.4 x 8.1 x 8.8 cm. Postvoid bladder volume segments are not 0.9 x 8.5 x 8.6 cm. Patient unable to void adequately. Postvoid volume 376 cc. There are multiple bladder diverticulum.    IMPRESSION: A pelvic sonogram inadequate for evaluation of prostate i.e. abscess. Transrectal prostate ultrasound recommended for clarification.    < end of copied text >

## 2020-10-23 NOTE — CHART NOTE - NSCHARTNOTEFT_GEN_A_CORE
Pts B/P 93/56(MAP 68)   Pt received Lopressor 25mg po at 18:00 and Flomax at 21:00  Pt asymptomatic  NS Bolus 250cc X1  Continue to monitor Pts B/P 93/56(MAP 68) P 69 R 18 PO 96%  Pt received Lopressor 25mg po at 18:00 and Flomax at 21:00  Pt asymptomatic  pt resting comfortably in bed. pt without complaints. pt states" he has low blood pressure. low blood pressure is good,"  NS Bolus 250cc X1  Continue to monitor

## 2020-10-23 NOTE — BEHAVIORAL HEALTH ASSESSMENT NOTE - SUICIDE RISK FACTORS
Recent onset of current/past psychiatric diagnosis/Alcohol/Substance abuse disorders/Agitation/Severe Anxiety/Panic

## 2020-10-23 NOTE — PHYSICAL THERAPY INITIAL EVALUATION ADULT - ADDITIONAL COMMENTS
as per pt. lives with wife in assisted living, ambulates without device, does not negotiates stairs, reports no falls

## 2020-10-24 LAB
ALBUMIN SERPL ELPH-MCNC: 2.8 G/DL — LOW (ref 3.3–5.2)
ALP SERPL-CCNC: 54 U/L — SIGNIFICANT CHANGE UP (ref 40–120)
ALT FLD-CCNC: 11 U/L — SIGNIFICANT CHANGE UP
ANION GAP SERPL CALC-SCNC: 12 MMOL/L — SIGNIFICANT CHANGE UP (ref 5–17)
AST SERPL-CCNC: 12 U/L — SIGNIFICANT CHANGE UP
BILIRUB SERPL-MCNC: 0.3 MG/DL — LOW (ref 0.4–2)
BUN SERPL-MCNC: 21 MG/DL — HIGH (ref 8–20)
CALCIUM SERPL-MCNC: 7.7 MG/DL — LOW (ref 8.6–10.2)
CHLORIDE SERPL-SCNC: 102 MMOL/L — SIGNIFICANT CHANGE UP (ref 98–107)
CO2 SERPL-SCNC: 23 MMOL/L — SIGNIFICANT CHANGE UP (ref 22–29)
CREAT SERPL-MCNC: 1.29 MG/DL — SIGNIFICANT CHANGE UP (ref 0.5–1.3)
CULTURE RESULTS: SIGNIFICANT CHANGE UP
GLUCOSE SERPL-MCNC: 119 MG/DL — HIGH (ref 70–99)
HCT VFR BLD CALC: 31.5 % — LOW (ref 39–50)
HGB BLD-MCNC: 9.8 G/DL — LOW (ref 13–17)
MAGNESIUM SERPL-MCNC: 1.9 MG/DL — SIGNIFICANT CHANGE UP (ref 1.6–2.6)
MCHC RBC-ENTMCNC: 29.8 PG — SIGNIFICANT CHANGE UP (ref 27–34)
MCHC RBC-ENTMCNC: 31.1 GM/DL — LOW (ref 32–36)
MCV RBC AUTO: 95.7 FL — SIGNIFICANT CHANGE UP (ref 80–100)
PLATELET # BLD AUTO: 153 K/UL — SIGNIFICANT CHANGE UP (ref 150–400)
POTASSIUM SERPL-MCNC: 3.8 MMOL/L — SIGNIFICANT CHANGE UP (ref 3.5–5.3)
POTASSIUM SERPL-SCNC: 3.8 MMOL/L — SIGNIFICANT CHANGE UP (ref 3.5–5.3)
PROT SERPL-MCNC: 5.9 G/DL — LOW (ref 6.6–8.7)
RBC # BLD: 3.29 M/UL — LOW (ref 4.2–5.8)
RBC # FLD: 17.3 % — HIGH (ref 10.3–14.5)
SARS-COV-2 IGG SERPL QL IA: NEGATIVE — SIGNIFICANT CHANGE UP
SARS-COV-2 IGM SERPL IA-ACNC: <0.1 INDEX — SIGNIFICANT CHANGE UP
SODIUM SERPL-SCNC: 137 MMOL/L — SIGNIFICANT CHANGE UP (ref 135–145)
SPECIMEN SOURCE: SIGNIFICANT CHANGE UP
WBC # BLD: 12.71 K/UL — HIGH (ref 3.8–10.5)
WBC # FLD AUTO: 12.71 K/UL — HIGH (ref 3.8–10.5)

## 2020-10-24 PROCEDURE — 99232 SBSQ HOSP IP/OBS MODERATE 35: CPT

## 2020-10-24 PROCEDURE — 99231 SBSQ HOSP IP/OBS SF/LOW 25: CPT

## 2020-10-24 RX ADMIN — PREGABALIN 1000 MICROGRAM(S): 225 CAPSULE ORAL at 11:36

## 2020-10-24 RX ADMIN — BUPROPION HYDROCHLORIDE 150 MILLIGRAM(S): 150 TABLET, EXTENDED RELEASE ORAL at 11:36

## 2020-10-24 RX ADMIN — Medication 137 MICROGRAM(S): at 05:38

## 2020-10-24 RX ADMIN — Medication 500 MILLIGRAM(S): at 21:28

## 2020-10-24 RX ADMIN — HEPARIN SODIUM 5000 UNIT(S): 5000 INJECTION INTRAVENOUS; SUBCUTANEOUS at 16:52

## 2020-10-24 RX ADMIN — Medication 5 MILLIGRAM(S): at 21:29

## 2020-10-24 RX ADMIN — HEPARIN SODIUM 5000 UNIT(S): 5000 INJECTION INTRAVENOUS; SUBCUTANEOUS at 05:38

## 2020-10-24 RX ADMIN — Medication 3 MILLILITER(S): at 09:13

## 2020-10-24 RX ADMIN — Medication 3 MILLILITER(S): at 02:57

## 2020-10-24 RX ADMIN — QUETIAPINE FUMARATE 25 MILLIGRAM(S): 200 TABLET, FILM COATED ORAL at 21:29

## 2020-10-24 RX ADMIN — Medication 81 MILLIGRAM(S): at 11:35

## 2020-10-24 RX ADMIN — Medication 25 MILLIGRAM(S): at 16:52

## 2020-10-24 RX ADMIN — CEFTRIAXONE 100 MILLIGRAM(S): 500 INJECTION, POWDER, FOR SOLUTION INTRAMUSCULAR; INTRAVENOUS at 11:35

## 2020-10-24 RX ADMIN — Medication 25 MILLIGRAM(S): at 05:38

## 2020-10-24 RX ADMIN — Medication 250 MILLIGRAM(S): at 16:52

## 2020-10-24 RX ADMIN — Medication 500 MILLIGRAM(S): at 05:38

## 2020-10-24 RX ADMIN — Medication 3 MILLILITER(S): at 20:53

## 2020-10-24 RX ADMIN — TAMSULOSIN HYDROCHLORIDE 0.4 MILLIGRAM(S): 0.4 CAPSULE ORAL at 21:28

## 2020-10-24 RX ADMIN — ESCITALOPRAM OXALATE 10 MILLIGRAM(S): 10 TABLET, FILM COATED ORAL at 11:36

## 2020-10-24 RX ADMIN — Medication 250 MILLIGRAM(S): at 05:38

## 2020-10-24 RX ADMIN — Medication 3 MILLILITER(S): at 16:10

## 2020-10-24 RX ADMIN — Medication 500 MILLIGRAM(S): at 14:20

## 2020-10-24 NOTE — PROGRESS NOTE ADULT - SUBJECTIVE AND OBJECTIVE BOX
[facility]  INFECTIOUS DISEASES AND INTERNAL MEDICINE at [place]  =======================================================  Emile Guthrie MD  Diplomates American Board of Internal Medicine and Infectious Diseases  Tel: [phone]      Fax: [phone]  =======================================================      [id number]  NAOMI BAXTER  follow up: diverticulitis / UTI     reports feeling better  labs with down trending WBC      REVIEW OF SYSTEMS:  CONSTITUTIONAL:  No Fever or chills  HEENT:  No diplopia or blurred vision.  No earache, sore throat or runny nose.  CARDIOVASCULAR:  No pressure, squeezing, strangling, tightness, heaviness or aching about the chest, neck, axilla or epigastrium.  RESPIRATORY:  No cough, shortness of breath  GASTROINTESTINAL:  no diarrhea  GENITOURINARY:  + Soto   MUSCULOSKELETAL:  no joint aches, no muscle pain  SKIN:  No change in skin, hair or nails.  NEUROLOGIC:  No Headaches, seizures  PSYCHIATRIC:  No disorder of thought or mood.  ENDOCRINE:  No heat or cold intolerance  HEMATOLOGICAL:  No easy bruising or bleeding.       Physical Exam:    GEN: NAD, pleasant  HEENT: normocephalic and atraumatic. EOMI. PERRL.  Anicteric  NECK: Supple.   LUNGS: Clear to auscultation.  HEART: Regular rate and rhythm   ABDOMEN: Soft, nontender, and nondistended.  Positive bowel sounds.    : Soto  EXTREMITIES: Without any edema.  MSK: No joint swelling  NEUROLOGIC: No Focal Deficits  PSYCHIATRIC: Appropriate affect .  SKIN: No Rash         Vitals:  ============  T(F): 98.9 (24 Oct 2020 16:26), Max: 98.9 (24 Oct 2020 16:26)  HR: 70 (24 Oct 2020 16:31)  BP: 120/74 (24 Oct 2020 16:26)  RR: 18 (24 Oct 2020 16:26)  SpO2: 96% (24 Oct 2020 16:26) (90% - 97%)  temp max in last 48H T(F): , Max: 98.9 (10-24-20 @ 16:26)    =======================================================  Current Antibiotics:  cefTRIAXone   IVPB 1000 milliGRAM(s) IV Intermittent every 24 hours  metroNIDAZOLE    Tablet 500 milliGRAM(s) Oral every 8 hours    Other medications:  albuterol/ipratropium for Nebulization 3 milliLiter(s) Nebulizer every 6 hours  aspirin  chewable 81 milliGRAM(s) Oral daily  buPROPion XL . 150 milliGRAM(s) Oral daily  cyanocobalamin 1000 MICROGram(s) Oral daily  escitalopram 10 milliGRAM(s) Oral daily  heparin   Injectable 5000 Unit(s) SubCutaneous every 12 hours  levothyroxine 137 MICROGram(s) Oral daily  melatonin 5 milliGRAM(s) Oral at bedtime  metoprolol tartrate 25 milliGRAM(s) Oral two times a day  QUEtiapine 25 milliGRAM(s) Oral at bedtime  saccharomyces boulardii 250 milliGRAM(s) Oral two times a day  tamsulosin 0.4 milliGRAM(s) Oral at bedtime      =======================================================  Labs:                        9.8    12.71 )-----------( 153      ( 24 Oct 2020 08:53 )             31.5      10-24    137  |  102  |  21.0<H>  ----------------------------<  119<H>  3.8   |  23.0  |  1.29    Ca    7.7<L>      24 Oct 2020 08:53  Mg     1.9     10-24    TPro  5.9<L>  /  Alb  2.8<L>  /  TBili  0.3<L>  /  DBili  x   /  AST  12  /  ALT  11  /  AlkPhos  54  10-24      Culture - Urine (collected 10-22-20 @ 21:53)  Source: .Urine Clean Catch (Midstream)  Final Report (10-24-20 @ 15:10):    >=3 organisms. Probable collection contamination.    Culture - Blood (collected 10-22-20 @ 12:44)  Source: .Blood Blood-Venous    Culture - Blood (collected 10-22-20 @ 12:44)  Source: .Blood Blood-Venous      Creatinine, Serum: 1.29 mg/dL (10-24-20 @ 08:53)  Creatinine, Serum: 1.28 mg/dL (10-23-20 @ 07:12)  Creatinine, Serum: 1.39 mg/dL (10-22-20 @ 12:15)        WBC Count: 12.71 K/uL (10-24-20 @ 08:53)  WBC Count: 13.14 K/uL (10-23-20 @ 07:12)  WBC Count: 24.68 K/uL (10-22-20 @ 19:18)  WBC Count: 28.32 K/uL (10-22-20 @ 12:15)      COVID-19 PCR: NotDetec (10-22-20 @ 13:58)  COVID-19 PCR: NotDetec (10-22-20 @ 11:56)  COVID-19 PCR: NotDetec (10-09-20 @ 01:23)      Alkaline Phosphatase, Serum: 54 U/L (10-24-20 @ 08:53)  Alkaline Phosphatase, Serum: 67 U/L (10-22-20 @ 12:15)  Alanine Aminotransferase (ALT/SGPT): 11 U/L (10-24-20 @ 08:53)  Alanine Aminotransferase (ALT/SGPT): 15 U/L (10-22-20 @ 12:15)  Aspartate Aminotransferase (AST/SGOT): 12 U/L (10-24-20 @ 08:53)  Aspartate Aminotransferase (AST/SGOT): 16 U/L (10-22-20 @ 12:15)  Bilirubin Total, Serum: 0.3 mg/dL (10-24-20 @ 08:53)  Bilirubin Total, Serum: 0.7 mg/dL (10-22-20 @ 12:15)        < from: Xray Chest 2 Views PA/Lat (10.22.20 @ 14:07) >  EXAM:  XR CHEST PA LAT 2V                          PROCEDURE DATE:  10/22/2020      INTERPRETATION:  PA and lateral chest radiographs    COMPARISON: 10/9/2020 chest radiograph.    CLINICAL INFORMATION: Leukocytosis. Assess for pneumonia..    FINDINGS:    The airway is midline.  There are no airspace consolidations.  There is no pleural effusion or pneumothorax.  The  heart is enlarged in transverse diameter. No hilar mass. Trachea midline.  Status post TEVAR procedure.   The visualized osseus structures are intact.    IMPRESSION:  Lung bases clear.  No acute radiographic cardiopulmonary pathology.    < end of copied text >      < from: CT Abdomen and Pelvis No Cont (10.22.20 @ 14:02) >   EXAM:  CT ABDOMEN AND PELVIS                          PROCEDURE DATE:  10/22/2020      INTERPRETATION:  CLINICAL INFORMATION: Leukocytosis and dysuria.    COMPARISON: None.    PROCEDURE:  CT of the Abdomen and Pelvis was performed without intravenous contrast.  Intravenous contrast: None.  Oral contrast: None.  Sagittal and coronal reformats were performed.    FINDINGS:    The lung bases are clear and there is no evidence of a pleural effusion.    The liver is unremarkable. Status post cholecystectomy.    The spleen, pancreas, and adrenal glands demonstrate no abnormality.    Left kidney demonstrates a normal noncontrast appearance without evidence of hydronephrosis. The right kidney demonstrates 2 cortical cysts including a large upper pole cyst measuring 6.1 cm. No evidence of right stone or hydronephrosis. No evidence of perinephric fat stranding on either side. No ureteral abnormality.    Postoperative changes involving the lower thoracic and upper abdominal aorta with a stent. Cannot evaluate stent integrity without IV contrast. Atherosclerotic changes of the mid and lower abdominal aorta and pelvic vessels.    No evidence of retroperitoneal or pelvic lymphadenopathy.    The prostate gland is enlarged. There is an asymmetric bulge in the left prostatic contour with a low density at this site. Further evaluation is recommended.    The bladder is unremarkable.    No evidence of ascites. Extensive colonic diverticulosis. Mildly hazy fat planes surrounding the proximal sigmoid colon consistent with diverticulitis in this location. No evidence of associated abscess. The appendix is visualized and is normal.    There is a small left inguinal hernia. The abdominal wall is otherwise unremarkable.    There are degenerative changes in the spine.    IMPRESSION:  Evidence of mild diverticulitis of the proximal sigmoid colon without evidence of associated abscess.    Asymmetric enlargement of the prostate gland with a low density within this portion of the prostate. Considering the patient's history, a prostatic abscess must be considered. A sonogram of the bladder/prostate is recommended.    < end of copied text >      < from: US Prostate, Non Rectal (10.22.20 @ 18:04) >  EXAM:  US PROSTATE NON RECTAL                          PROCEDURE DATE:  10/22/2020      INTERPRETATION:  HISTORY: Evaluate for prostate abscess is queried on 10/22/2020 CT scan 1:48 PM    TECHNIQUE: Transabdominal pelvic sonogram.    FINDINGS:    Prosthetic area measures 7.7 x 8.7 x 7.8 cm. The clarity of prostate images is inadequate to evaluate for prostate abscess or other pathologies.  Bladder is distended measuring 10.4 x 8.1 x 8.8 cm. Postvoid bladder volume segments are not 0.9 x 8.5 x 8.6 cm. Patient unable to void adequately. Postvoid volume 376 cc. There are multiple bladder diverticulum.    IMPRESSION: A pelvic sonogram inadequate for evaluation of prostate i.e. abscess. Transrectal prostate ultrasound recommended for clarification.    < end of copied text >

## 2020-10-24 NOTE — PROGRESS NOTE ADULT - SUBJECTIVE AND OBJECTIVE BOX
INTERVAL HPI/OVERNIGHT EVENTS/SUBJECTIVE:  88yM, with urinary retention, had lester placed yesterday by urology. CT scan shows questionable prostatic abscess.   Offers no complaints.     ICU Vital Signs Last 24 Hrs  T(C): 36.6 (24 Oct 2020 07:57), Max: 36.9 (23 Oct 2020 15:23)  T(F): 97.9 (24 Oct 2020 07:57), Max: 98.5 (23 Oct 2020 15:23)  HR: 66 (24 Oct 2020 09:26) (66 - 79)  BP: 115/72 (24 Oct 2020 07:57) (93/56 - 146/87)  BP(mean): --  ABP: --  ABP(mean): --  RR: 18 (24 Oct 2020 07:57) (18 - 18)  SpO2: 90% (24 Oct 2020 07:57) (90% - 98%)      I&O's Detail    23 Oct 2020 07:01  -  24 Oct 2020 07:00  --------------------------------------------------------  IN:  Total IN: 0 mL    OUT:    Indwelling Catheter - Urethral (mL): 400 mL    Voided (mL): 140 mL  Total OUT: 540 mL    Total NET: -540 mL        MEDICATIONS  (STANDING):  albuterol/ipratropium for Nebulization 3 milliLiter(s) Nebulizer every 6 hours  aspirin  chewable 81 milliGRAM(s) Oral daily  buPROPion XL . 150 milliGRAM(s) Oral daily  cefTRIAXone   IVPB 1000 milliGRAM(s) IV Intermittent every 24 hours  cyanocobalamin 1000 MICROGram(s) Oral daily  escitalopram 10 milliGRAM(s) Oral daily  heparin   Injectable 5000 Unit(s) SubCutaneous every 12 hours  levothyroxine 137 MICROGram(s) Oral daily  melatonin 5 milliGRAM(s) Oral at bedtime  metoprolol tartrate 25 milliGRAM(s) Oral two times a day  metroNIDAZOLE    Tablet 500 milliGRAM(s) Oral every 8 hours  QUEtiapine 25 milliGRAM(s) Oral at bedtime  saccharomyces boulardii 250 milliGRAM(s) Oral two times a day  tamsulosin 0.4 milliGRAM(s) Oral at bedtime    MEDICATIONS  (PRN):  ALBUTerol    90 MICROgram(s) HFA Inhaler 2 Puff(s) Inhalation every 6 hours PRN Shortness of Breath  guaiFENesin   Syrup  (Sugar-Free) 100 milliGRAM(s) Oral every 6 hours PRN Cough    MISC:     PHYSICAL EXAM:  Constitutional: NAD  Respiratory: no accessory muscle use or conversational dyspnea  Cardiovascular: S1S2  : lester, urine dark/di tinged     tender  LABS:  CBC Full  -  ( 24 Oct 2020 08:53 )  WBC Count : 12.71 K/uL  RBC Count : 3.29 M/uL  Hemoglobin : 9.8 g/dL  Hematocrit : 31.5 %  Platelet Count - Automated : 153 K/uL  Mean Cell Volume : 95.7 fl  Mean Cell Hemoglobin : 29.8 pg  Mean Cell Hemoglobin Concentration : 31.1 gm/dL  Auto Neutrophil # : x  Auto Lymphocyte # : x  Auto Monocyte # : x  Auto Eosinophil # : x  Auto Basophil # : x  Auto Neutrophil % : x  Auto Lymphocyte % : x  Auto Monocyte % : x  Auto Eosinophil % : x  Auto Basophil % : x    10-24    137  |  102  |  21.0<H>  ----------------------------<  119<H>  3.8   |  23.0  |  1.29    Ca    7.7<L>      24 Oct 2020 08:53  Mg     1.9     10-24    TPro  5.9<L>  /  Alb  2.8<L>  /  TBili  0.3<L>  /  DBili  x   /  AST  12  /  ALT  11  /  AlkPhos  54  10-24      Urinalysis Basic - ( 22 Oct 2020 11:56 )    Color: Yellow / Appearance: Clear / S.010 / pH: x  Gluc: x / Ketone: Negative  / Bili: Negative / Urobili: Negative mg/dL   Blood: x / Protein: Negative mg/dL / Nitrite: Negative   Leuk Esterase: Moderate / RBC: Negative /HPF / WBC 6-10   Sq Epi: x / Non Sq Epi: Negative / Bacteria: Occasional      RECENT CULTURES:      LIVER FUNCTIONS - ( 24 Oct 2020 08:53 )  Alb: 2.8 g/dL / Pro: 5.9 g/dL / ALK PHOS: 54 U/L / ALT: 11 U/L / AST: 12 U/L / GGT: x             ASSESSMENT/PLAN:   88M with BPH, UTI and difficult lester placement    - maintain lester, monitor UOP  - CT scan reviewed by Dr. Rock- does not appear to be a prostate abscess. Recommend rescan in a few days.

## 2020-10-24 NOTE — PROGRESS NOTE ADULT - SUBJECTIVE AND OBJECTIVE BOX
NAOMI BAXTER  ----------------------------------------  The patient was seen and evaluated for urinary tract infection. Offers no complaints. Tolerated oral intake.    Vital Signs Last 24 Hrs  T(C): 36.6 (24 Oct 2020 07:57), Max: 36.9 (23 Oct 2020 15:23)  T(F): 97.9 (24 Oct 2020 07:57), Max: 98.5 (23 Oct 2020 15:23)  HR: 66 (24 Oct 2020 09:26) (66 - 79)  BP: 115/72 (24 Oct 2020 07:57) (93/56 - 146/87)  BP(mean): --  RR: 18 (24 Oct 2020 07:57) (18 - 18)  SpO2: 90% (24 Oct 2020 07:57) (90% - 98%)    CAPILLARY BLOOD GLUCOSE        PHYSICAL EXAMINATION:  ----------------------------------------      LABORATORY STUDIES:  ----------------------------------------                        9.8    12.71 )-----------( 153      ( 24 Oct 2020 08:53 )             31.5     10-24    137  |  102  |  21.0<H>  ----------------------------<  119<H>  3.8   |  23.0  |  1.29    Ca    7.7<L>      24 Oct 2020 08:53  Mg     1.9     10-24    TPro  5.9<L>  /  Alb  2.8<L>  /  TBili  0.3<L>  /  DBili  x   /  AST  12  /  ALT  11  /  AlkPhos  54  10-24    LIVER FUNCTIONS - ( 24 Oct 2020 08:53 )  Alb: 2.8 g/dL / Pro: 5.9 g/dL / ALK PHOS: 54 U/L / ALT: 11 U/L / AST: 12 U/L / GGT: x                     Urinalysis Basic - ( 22 Oct 2020 11:56 )    Color: Yellow / Appearance: Clear / S.010 / pH: x  Gluc: x / Ketone: Negative  / Bili: Negative / Urobili: Negative mg/dL   Blood: x / Protein: Negative mg/dL / Nitrite: Negative   Leuk Esterase: Moderate / RBC: Negative /HPF / WBC 6-10   Sq Epi: x / Non Sq Epi: Negative / Bacteria: Occasional          MEDICATIONS  (STANDING):  albuterol/ipratropium for Nebulization 3 milliLiter(s) Nebulizer every 6 hours  aspirin  chewable 81 milliGRAM(s) Oral daily  buPROPion XL . 150 milliGRAM(s) Oral daily  cefTRIAXone   IVPB 1000 milliGRAM(s) IV Intermittent every 24 hours  cyanocobalamin 1000 MICROGram(s) Oral daily  escitalopram 10 milliGRAM(s) Oral daily  heparin   Injectable 5000 Unit(s) SubCutaneous every 12 hours  levothyroxine 137 MICROGram(s) Oral daily  melatonin 5 milliGRAM(s) Oral at bedtime  metoprolol tartrate 25 milliGRAM(s) Oral two times a day  metroNIDAZOLE    Tablet 500 milliGRAM(s) Oral every 8 hours  QUEtiapine 25 milliGRAM(s) Oral at bedtime  saccharomyces boulardii 250 milliGRAM(s) Oral two times a day  tamsulosin 0.4 milliGRAM(s) Oral at bedtime    MEDICATIONS  (PRN):  ALBUTerol    90 MICROgram(s) HFA Inhaler 2 Puff(s) Inhalation every 6 hours PRN Shortness of Breath  guaiFENesin   Syrup  (Sugar-Free) 100 milliGRAM(s) Oral every 6 hours PRN Cough      ASSESSMENT / PLAN:  ----------------------------------------   NAOMI BAXTER  ----------------------------------------  The patient was seen and evaluated for urinary tract infection. Offers no complaints. Tolerated oral intake.    Vital Signs Last 24 Hrs  T(C): 36.6 (24 Oct 2020 07:57), Max: 36.9 (23 Oct 2020 15:23)  T(F): 97.9 (24 Oct 2020 07:57), Max: 98.5 (23 Oct 2020 15:23)  HR: 66 (24 Oct 2020 09:26) (66 - 79)  BP: 115/72 (24 Oct 2020 07:57) (93/56 - 146/87)  BP(mean): --  RR: 18 (24 Oct 2020 07:57) (18 - 18)  SpO2: 90% (24 Oct 2020 07:57) (90% - 98%)    PHYSICAL EXAMINATION:  ----------------------------------------  General appearance: No acute distress, Awake, Alert  HEENT: Normocephalic, Atraumatic, Conjunctiva clear, EOMI  Neck: Supple, No JVD, No tenderness  Lungs: Breath sound equal bilaterally, No wheezes, No rales  Cardiovascular: S1S2, Regular rhythm  Abdomen: Soft, Nontender, Nondistended, No guarding/rebound, Positive bowel sounds  Extremities: No clubbing, No cyanosis, No edema, No calf tenderness  Neuro: Strength equal bilaterally, No tremors  Psychiatric: Appropriate mood, Normal affect    LABORATORY STUDIES:  ----------------------------------------             9.8    12.71 )-----------( 153      ( 24 Oct 2020 08:53 )             31.5     10-24    137  |  102  |  21.0<H>  ----------------------------<  119<H>  3.8   |  23.0  |  1.29    Ca    7.7<L>      24 Oct 2020 08:53  Mg     1.9     10-24    TPro  5.9<L>  /  Alb  2.8<L>  /  TBili  0.3<L>  /  DBili  x   /  AST  12  /  ALT  11  /  AlkPhos  54  10-24    LIVER FUNCTIONS - ( 24 Oct 2020 08:53 )  Alb: 2.8 g/dL / Pro: 5.9 g/dL / ALK PHOS: 54 U/L / ALT: 11 U/L / AST: 12 U/L / GGT: x           Urinalysis Basic - ( 22 Oct 2020 11:56 )  Color: Yellow / Appearance: Clear / S.010 / pH: x  Gluc: x / Ketone: Negative  / Bili: Negative / Urobili: Negative mg/dL   Blood: x / Protein: Negative mg/dL / Nitrite: Negative   Leuk Esterase: Moderate / RBC: Negative /HPF / WBC 6-10   Sq Epi: x / Non Sq Epi: Negative / Bacteria: Occasional    MEDICATIONS  (STANDING):  albuterol/ipratropium for Nebulization 3 milliLiter(s) Nebulizer every 6 hours  aspirin  chewable 81 milliGRAM(s) Oral daily  buPROPion XL . 150 milliGRAM(s) Oral daily  cefTRIAXone   IVPB 1000 milliGRAM(s) IV Intermittent every 24 hours  cyanocobalamin 1000 MICROGram(s) Oral daily  escitalopram 10 milliGRAM(s) Oral daily  heparin   Injectable 5000 Unit(s) SubCutaneous every 12 hours  levothyroxine 137 MICROGram(s) Oral daily  melatonin 5 milliGRAM(s) Oral at bedtime  metoprolol tartrate 25 milliGRAM(s) Oral two times a day  metroNIDAZOLE    Tablet 500 milliGRAM(s) Oral every 8 hours  QUEtiapine 25 milliGRAM(s) Oral at bedtime  saccharomyces boulardii 250 milliGRAM(s) Oral two times a day  tamsulosin 0.4 milliGRAM(s) Oral at bedtime    MEDICATIONS  (PRN):  ALBUTerol    90 MICROgram(s) HFA Inhaler 2 Puff(s) Inhalation every 6 hours PRN Shortness of Breath  guaiFENesin   Syrup  (Sugar-Free) 100 milliGRAM(s) Oral every 6 hours PRN Cough      ASSESSMENT / PLAN:  ----------------------------------------  85 year old male from [facility] with hx of anxiety, bipolar, depression, hypothyroidism, asthma/copd, idiopathic thrombocytopenia who presents c/o dysuria, UA positive and noted with UTI. Also noted with LLQ abd pain and ct abd/pelvis showing evidence of mild acute diverticulitis, also noted with possible prostate abscess.     Acute diverticulitis - CT of the abdomen noted mild diverticulitis of the proximal sigmoid colon without abscess. On ceftriaxone and metronidazole. No abdominal tenderness on examination. Leukocytosis improved. Afebrile.    Urinary tract infection / Urine retention - On intravenous antibiotics. Soto catheter in place. On tamsulosin. The patient reported a history of prostate issues in the past. Urine and blood cultures results to be followed.    Abnormal prostate imaging - Urology consultation noted. For further review of imaging prior to recommendations.    Hypothyroidism - On levothyroxine.    Chronic kidney disease III - Monitoring renal functions. Monitoring urine output from Soto catheter.    COPD - No dyspnea or hypoxia on examination. Inhaled bronchodilator therapy as needed.    Bipolar disorder - On bupropion, escitalopram, and quetiapine. Psychiatry consultation noted.    Physical Therapy evaluation noted. NAOMI BAXTER  ----------------------------------------  The patient was seen and evaluated for urinary tract infection. Offers no complaints. Tolerated oral intake.    Vital Signs Last 24 Hrs  T(C): 36.6 (24 Oct 2020 07:57), Max: 36.9 (23 Oct 2020 15:23)  T(F): 97.9 (24 Oct 2020 07:57), Max: 98.5 (23 Oct 2020 15:23)  HR: 66 (24 Oct 2020 09:26) (66 - 79)  BP: 115/72 (24 Oct 2020 07:57) (93/56 - 146/87)  BP(mean): --  RR: 18 (24 Oct 2020 07:57) (18 - 18)  SpO2: 90% (24 Oct 2020 07:57) (90% - 98%)    PHYSICAL EXAMINATION:  ----------------------------------------  General appearance: No acute distress, Awake, Alert  HEENT: Normocephalic, Atraumatic, Conjunctiva clear, EOMI  Neck: Supple, No JVD, No tenderness  Lungs: Breath sound equal bilaterally, No wheezes, No rales  Cardiovascular: S1S2, Regular rhythm  Abdomen: Soft, Nontender, Nondistended, No guarding/rebound, Positive bowel sounds  Extremities: No clubbing, No cyanosis, No edema, No calf tenderness  Neuro: Strength equal bilaterally, No tremors  Psychiatric: Appropriate mood, Normal affect    LABORATORY STUDIES:  ----------------------------------------             9.8    12.71 )-----------( 153      ( 24 Oct 2020 08:53 )             31.5     10-24    137  |  102  |  21.0<H>  ----------------------------<  119<H>  3.8   |  23.0  |  1.29    Ca    7.7<L>      24 Oct 2020 08:53  Mg     1.9     10-24    TPro  5.9<L>  /  Alb  2.8<L>  /  TBili  0.3<L>  /  DBili  x   /  AST  12  /  ALT  11  /  AlkPhos  54  10-24    LIVER FUNCTIONS - ( 24 Oct 2020 08:53 )  Alb: 2.8 g/dL / Pro: 5.9 g/dL / ALK PHOS: 54 U/L / ALT: 11 U/L / AST: 12 U/L / GGT: x           Urinalysis Basic - ( 22 Oct 2020 11:56 )  Color: Yellow / Appearance: Clear / S.010 / pH: x  Gluc: x / Ketone: Negative  / Bili: Negative / Urobili: Negative mg/dL   Blood: x / Protein: Negative mg/dL / Nitrite: Negative   Leuk Esterase: Moderate / RBC: Negative /HPF / WBC 6-10   Sq Epi: x / Non Sq Epi: Negative / Bacteria: Occasional    MEDICATIONS  (STANDING):  albuterol/ipratropium for Nebulization 3 milliLiter(s) Nebulizer every 6 hours  aspirin  chewable 81 milliGRAM(s) Oral daily  buPROPion XL . 150 milliGRAM(s) Oral daily  cefTRIAXone   IVPB 1000 milliGRAM(s) IV Intermittent every 24 hours  cyanocobalamin 1000 MICROGram(s) Oral daily  escitalopram 10 milliGRAM(s) Oral daily  heparin   Injectable 5000 Unit(s) SubCutaneous every 12 hours  levothyroxine 137 MICROGram(s) Oral daily  melatonin 5 milliGRAM(s) Oral at bedtime  metoprolol tartrate 25 milliGRAM(s) Oral two times a day  metroNIDAZOLE    Tablet 500 milliGRAM(s) Oral every 8 hours  QUEtiapine 25 milliGRAM(s) Oral at bedtime  saccharomyces boulardii 250 milliGRAM(s) Oral two times a day  tamsulosin 0.4 milliGRAM(s) Oral at bedtime    MEDICATIONS  (PRN):  ALBUTerol    90 MICROgram(s) HFA Inhaler 2 Puff(s) Inhalation every 6 hours PRN Shortness of Breath  guaiFENesin   Syrup  (Sugar-Free) 100 milliGRAM(s) Oral every 6 hours PRN Cough      ASSESSMENT / PLAN:  ----------------------------------------  85 year old male from [facility] with hx of anxiety, bipolar, depression, hypothyroidism, asthma/copd, idiopathic thrombocytopenia who presents c/o dysuria, UA positive and noted with UTI. Also noted with LLQ abd pain and ct abd/pelvis showing evidence of mild acute diverticulitis, also noted with possible prostate abscess.     Acute diverticulitis - CT of the abdomen noted mild diverticulitis of the proximal sigmoid colon without abscess. On ceftriaxone and metronidazole. No abdominal tenderness on examination. Leukocytosis improved. Afebrile. Infectious Disease consultation noted.  Urine and blood cultures results to be followed.    Urine retention - Soto catheter in place. On tamsulosin. The patient reported a history of prostate issues in the past.    Abnormal prostate imaging - Urology consultation noted. For further review of imaging prior to recommendations.    Hypothyroidism - On levothyroxine.    Chronic kidney disease III - Monitoring renal functions. Monitoring urine output from Soto catheter.    COPD - No dyspnea or hypoxia on examination. Inhaled bronchodilator therapy as needed.    Bipolar disorder - On bupropion, escitalopram, and quetiapine. Psychiatry consultation noted.    Physical Therapy evaluation noted.

## 2020-10-25 LAB
HCT VFR BLD CALC: 30.7 % — LOW (ref 39–50)
HGB BLD-MCNC: 9.7 G/DL — LOW (ref 13–17)
MCHC RBC-ENTMCNC: 29.9 PG — SIGNIFICANT CHANGE UP (ref 27–34)
MCHC RBC-ENTMCNC: 31.6 GM/DL — LOW (ref 32–36)
MCV RBC AUTO: 94.8 FL — SIGNIFICANT CHANGE UP (ref 80–100)
PLATELET # BLD AUTO: 158 K/UL — SIGNIFICANT CHANGE UP (ref 150–400)
RBC # BLD: 3.24 M/UL — LOW (ref 4.2–5.8)
RBC # FLD: 17.2 % — HIGH (ref 10.3–14.5)
WBC # BLD: 10.33 K/UL — SIGNIFICANT CHANGE UP (ref 3.8–10.5)
WBC # FLD AUTO: 10.33 K/UL — SIGNIFICANT CHANGE UP (ref 3.8–10.5)

## 2020-10-25 PROCEDURE — 99232 SBSQ HOSP IP/OBS MODERATE 35: CPT

## 2020-10-25 RX ADMIN — HEPARIN SODIUM 5000 UNIT(S): 5000 INJECTION INTRAVENOUS; SUBCUTANEOUS at 05:36

## 2020-10-25 RX ADMIN — Medication 137 MICROGRAM(S): at 05:36

## 2020-10-25 RX ADMIN — Medication 3 MILLILITER(S): at 02:21

## 2020-10-25 RX ADMIN — Medication 81 MILLIGRAM(S): at 09:51

## 2020-10-25 RX ADMIN — Medication 3 MILLILITER(S): at 09:10

## 2020-10-25 RX ADMIN — QUETIAPINE FUMARATE 25 MILLIGRAM(S): 200 TABLET, FILM COATED ORAL at 21:37

## 2020-10-25 RX ADMIN — Medication 5 MILLIGRAM(S): at 21:37

## 2020-10-25 RX ADMIN — Medication 3 MILLILITER(S): at 16:22

## 2020-10-25 RX ADMIN — Medication 3 MILLILITER(S): at 21:17

## 2020-10-25 RX ADMIN — HEPARIN SODIUM 5000 UNIT(S): 5000 INJECTION INTRAVENOUS; SUBCUTANEOUS at 16:08

## 2020-10-25 RX ADMIN — Medication 25 MILLIGRAM(S): at 16:09

## 2020-10-25 RX ADMIN — Medication 250 MILLIGRAM(S): at 05:36

## 2020-10-25 RX ADMIN — TAMSULOSIN HYDROCHLORIDE 0.4 MILLIGRAM(S): 0.4 CAPSULE ORAL at 21:37

## 2020-10-25 RX ADMIN — Medication 500 MILLIGRAM(S): at 13:06

## 2020-10-25 RX ADMIN — Medication 25 MILLIGRAM(S): at 05:36

## 2020-10-25 RX ADMIN — Medication 500 MILLIGRAM(S): at 05:36

## 2020-10-25 RX ADMIN — BUPROPION HYDROCHLORIDE 150 MILLIGRAM(S): 150 TABLET, EXTENDED RELEASE ORAL at 13:06

## 2020-10-25 RX ADMIN — CEFTRIAXONE 100 MILLIGRAM(S): 500 INJECTION, POWDER, FOR SOLUTION INTRAMUSCULAR; INTRAVENOUS at 09:51

## 2020-10-25 RX ADMIN — Medication 250 MILLIGRAM(S): at 16:09

## 2020-10-25 RX ADMIN — PREGABALIN 1000 MICROGRAM(S): 225 CAPSULE ORAL at 09:51

## 2020-10-25 RX ADMIN — Medication 500 MILLIGRAM(S): at 21:37

## 2020-10-25 RX ADMIN — ESCITALOPRAM OXALATE 10 MILLIGRAM(S): 10 TABLET, FILM COATED ORAL at 09:51

## 2020-10-25 NOTE — PROGRESS NOTE ADULT - SUBJECTIVE AND OBJECTIVE BOX
NAOMI BAXTER  ----------------------------------------  The patient was seen and evaluated for urinary tract infection. Offers no complaints. Tolerating oral intake.    Vital Signs Last 24 Hrs  T(C): 36.4 (25 Oct 2020 07:21), Max: 37.2 (24 Oct 2020 16:26)  T(F): 97.6 (25 Oct 2020 07:21), Max: 98.9 (24 Oct 2020 16:26)  HR: 70 (25 Oct 2020 09:20) (68 - 77)  BP: 102/63 (25 Oct 2020 07:21) (102/63 - 124/70)  BP(mean): --  RR: 18 (25 Oct 2020 07:21) (18 - 18)  SpO2: 95% (25 Oct 2020 07:21) (95% - 97%)    PHYSICAL EXAMINATION:  ----------------------------------------  General appearance: No acute distress, Awake, Alert  HEENT: Normocephalic, Atraumatic, Conjunctiva clear, EOMI  Neck: Supple, No JVD, No tenderness  Lungs: Breath sound equal bilaterally, No wheezes, No rales  Cardiovascular: S1S2, Regular rhythm  Abdomen: Soft, Nontender, Nondistended, No guarding/rebound, Positive bowel sounds  Extremities: No clubbing, No cyanosis, No edema, No calf tenderness  Neuro: Strength equal bilaterally, No tremors  Psychiatric: Appropriate mood, Normal affect    LABORATORY STUDIES:  ----------------------------------------             9.7    10.33 )-----------( 158      ( 25 Oct 2020 07:39 )             30.7     10-24    137  |  102  |  21.0<H>  ----------------------------<  119<H>  3.8   |  23.0  |  1.29    Ca    7.7<L>      24 Oct 2020 08:53  Mg     1.9     10-24    TPro  5.9<L>  /  Alb  2.8<L>  /  TBili  0.3<L>  /  DBili  x   /  AST  12  /  ALT  11  /  AlkPhos  54  10-24    LIVER FUNCTIONS - ( 24 Oct 2020 08:53 )  Alb: 2.8 g/dL / Pro: 5.9 g/dL / ALK PHOS: 54 U/L / ALT: 11 U/L / AST: 12 U/L / GGT: x           Culture - Urine (collected 22 Oct 2020 21:53)  Source: .Urine Clean Catch (Midstream)  Final Report (24 Oct 2020 15:10):    >=3 organisms. Probable collection contamination.    Culture - Blood (collected 22 Oct 2020 12:44)  Source: .Blood Blood-Venous  Preliminary Report (24 Oct 2020 13:00):    No growth at 48 hours    Culture - Blood (collected 22 Oct 2020 12:44)  Source: .Blood Blood-Venous  Preliminary Report (24 Oct 2020 13:00):    No growth at 48 hours    MEDICATIONS  (STANDING):  albuterol/ipratropium for Nebulization 3 milliLiter(s) Nebulizer every 6 hours  aspirin  chewable 81 milliGRAM(s) Oral daily  buPROPion XL . 150 milliGRAM(s) Oral daily  cefTRIAXone   IVPB 1000 milliGRAM(s) IV Intermittent every 24 hours  cyanocobalamin 1000 MICROGram(s) Oral daily  escitalopram 10 milliGRAM(s) Oral daily  heparin   Injectable 5000 Unit(s) SubCutaneous every 12 hours  levothyroxine 137 MICROGram(s) Oral daily  melatonin 5 milliGRAM(s) Oral at bedtime  metoprolol tartrate 25 milliGRAM(s) Oral two times a day  metroNIDAZOLE    Tablet 500 milliGRAM(s) Oral every 8 hours  QUEtiapine 25 milliGRAM(s) Oral at bedtime  saccharomyces boulardii 250 milliGRAM(s) Oral two times a day  tamsulosin 0.4 milliGRAM(s) Oral at bedtime    MEDICATIONS  (PRN):  ALBUTerol    90 MICROgram(s) HFA Inhaler 2 Puff(s) Inhalation every 6 hours PRN Shortness of Breath  guaiFENesin   Syrup  (Sugar-Free) 100 milliGRAM(s) Oral every 6 hours PRN Cough      ASSESSMENT / PLAN:  ----------------------------------------  85 year old male from [facility] with hx of anxiety, bipolar, depression, hypothyroidism, asthma/copd, idiopathic thrombocytopenia who presents c/o dysuria, UA positive and noted with UTI. Also noted with LLQ abd pain and ct abd/pelvis showing evidence of mild acute diverticulitis, also noted with possible prostate abscess.     Acute diverticulitis - CT of the abdomen noted mild diverticulitis of the proximal sigmoid colon without abscess. On ceftriaxone and metronidazole. No abdominal tenderness on examination. Leukocytosis improved. Afebrile. Infectious Disease consultation noted.  Urine and blood cultures results to be followed.    Urine retention - On tamsulosin. Soto catheter in place, draining clear urine. The patient reported a history of prostate issues in the past.    Abnormal prostate imaging - Urology consultation noted. Urology follow up noted, for repeat imaging of the prostate.    Hypothyroidism - On levothyroxine.    Chronic kidney disease III - Monitoring urine output from Soto catheter. Renal function stable.    COPD - No dyspnea or hypoxia on examination. Inhaled bronchodilator therapy as needed.    Bipolar disorder - On bupropion, escitalopram, and quetiapine. Psychiatry consultation noted, no further intervention recommended.    Physical Therapy evaluation noted.

## 2020-10-25 NOTE — PROGRESS NOTE ADULT - SUBJECTIVE AND OBJECTIVE BOX
[facility]  INFECTIOUS DISEASES AND INTERNAL MEDICINE at [place]  =======================================================  Emile Guthrie MD  Diplomates American Board of Internal Medicine and Infectious Diseases  Tel: [phone]      Fax: [phone]  =======================================================    NAOMI BAXTER [id number]    Follow up: UTI    No fever or chills  No flank pain  No complaints       Allergies:  penicillin (Rash; Hives)      REVIEW OF SYSTEMS:  CONSTITUTIONAL:  No Fever or chills  HEENT:  No diplopia or blurred vision.  No earache, sore throat or runny nose.  CARDIOVASCULAR:  No pressure, squeezing, strangling, tightness, heaviness or aching about the chest, neck, axilla or epigastrium.  RESPIRATORY:  No cough, shortness of breath  GASTROINTESTINAL:  No nausea No vomiting or diarrhea.  GENITOURINARY:  + Soto   MUSCULOSKELETAL:  no joint aches, no muscle pain  SKIN:  No change in skin, hair or nails.  NEUROLOGIC:  No Headaches, seizures  PSYCHIATRIC:  No disorder of thought or mood.  ENDOCRINE:  No heat or cold intolerance  HEMATOLOGICAL:  No easy bruising or bleeding.       Physical Exam:  GEN: NAD, pleasant  HEENT: normocephalic and atraumatic. EOMI. PERRL.  Anicteric  NECK: Supple.   LUNGS: Clear to auscultation.  HEART: Regular rate and rhythm   ABDOMEN: Soft, nontender, and nondistended.  Positive bowel sounds.    : Soto  EXTREMITIES: Without any edema.  MSK: No joint swelling  NEUROLOGIC: No Focal Deficits  PSYCHIATRIC: Appropriate affect .  SKIN: No Rash      Vitals:  T(F): 97.6 (25 Oct 2020 07:21), Max: 98.9 (24 Oct 2020 16:26)  HR: 69 (25 Oct 2020 07:21)  BP: 102/63 (25 Oct 2020 07:21)  RR: 18 (25 Oct 2020 07:21)  SpO2: 95% (25 Oct 2020 07:21) (90% - 97%)  temp max in last 48H T(F): , Max: 98.9 (10-24-20 @ 16:26)      Current Antibiotics:  cefTRIAXone   IVPB 1000 milliGRAM(s) IV Intermittent every 24 hours  metroNIDAZOLE    Tablet 500 milliGRAM(s) Oral every 8 hours      Other medications:  albuterol/ipratropium for Nebulization 3 milliLiter(s) Nebulizer every 6 hours  aspirin  chewable 81 milliGRAM(s) Oral daily  buPROPion XL . 150 milliGRAM(s) Oral daily  cyanocobalamin 1000 MICROGram(s) Oral daily  escitalopram 10 milliGRAM(s) Oral daily  heparin   Injectable 5000 Unit(s) SubCutaneous every 12 hours  levothyroxine 137 MICROGram(s) Oral daily  melatonin 5 milliGRAM(s) Oral at bedtime  metoprolol tartrate 25 milliGRAM(s) Oral two times a day  QUEtiapine 25 milliGRAM(s) Oral at bedtime  saccharomyces boulardii 250 milliGRAM(s) Oral two times a day  tamsulosin 0.4 milliGRAM(s) Oral at bedtime      Labs:                        9.8    12.71 )-----------( 153      ( 24 Oct 2020 08:53 )             31.5      10-24    137  |  102  |  21.0<H>  ----------------------------<  119<H>  3.8   |  23.0  |  1.29    Ca    7.7<L>      24 Oct 2020 08:53  Mg     1.9     10-24    TPro  5.9<L>  /  Alb  2.8<L>  /  TBili  0.3<L>  /  DBili  x   /  AST  12  /  ALT  11  /  AlkPhos  54  10-24      Culture - Urine (collected 10-22-20 @ 21:53)  Source: .Urine Clean Catch (Midstream)  Final Report (10-24-20 @ 15:10):    >=3 organisms. Probable collection contamination.    Culture - Blood (collected 10-22-20 @ 12:44)  Source: .Blood Blood-Venous    Culture - Blood (collected 10-22-20 @ 12:44)  Source: .Blood Blood-Venous      WBC Count: 12.71 K/uL (10-24-20 @ 08:53)  WBC Count: 13.14 K/uL (10-23-20 @ 07:12)  WBC Count: 24.68 K/uL (10-22-20 @ 19:18)  WBC Count: 28.32 K/uL (10-22-20 @ 12:15)    Creatinine, Serum: 1.29 mg/dL (10-24-20 @ 08:53)  Creatinine, Serum: 1.28 mg/dL (10-23-20 @ 07:12)  Creatinine, Serum: 1.39 mg/dL (10-22-20 @ 12:15)    COVID-19 PCR: NotDetec (10-22-20 @ 13:58)  COVID-19 PCR: NotDetec (10-22-20 @ 11:56)  COVID-19 PCR: NotDetec (10-09-20 @ 01:23)

## 2020-10-26 ENCOUNTER — TRANSCRIPTION ENCOUNTER (OUTPATIENT)
Age: 85
End: 2020-10-26

## 2020-10-26 LAB
PROCALCITONIN SERPL-MCNC: 0.38 NG/ML — HIGH (ref 0.02–0.1)
SARS-COV-2 RNA SPEC QL NAA+PROBE: SIGNIFICANT CHANGE UP

## 2020-10-26 PROCEDURE — 99232 SBSQ HOSP IP/OBS MODERATE 35: CPT

## 2020-10-26 PROCEDURE — 74177 CT ABD & PELVIS W/CONTRAST: CPT | Mod: 26

## 2020-10-26 RX ORDER — RISPERIDONE 4 MG/1
1 TABLET ORAL
Qty: 0 | Refills: 0 | DISCHARGE

## 2020-10-26 RX ORDER — ALPRAZOLAM 0.25 MG
2 TABLET ORAL
Qty: 0 | Refills: 0 | DISCHARGE

## 2020-10-26 RX ORDER — LANOLIN ALCOHOL/MO/W.PET/CERES
0 CREAM (GRAM) TOPICAL
Qty: 0 | Refills: 0 | DISCHARGE

## 2020-10-26 RX ORDER — QUETIAPINE FUMARATE 200 MG/1
1 TABLET, FILM COATED ORAL
Qty: 0 | Refills: 0 | DISCHARGE

## 2020-10-26 RX ORDER — ESCITALOPRAM OXALATE 10 MG/1
0 TABLET, FILM COATED ORAL
Qty: 0 | Refills: 0 | DISCHARGE

## 2020-10-26 RX ORDER — ALPRAZOLAM 0.25 MG
1 TABLET ORAL
Qty: 0 | Refills: 0 | DISCHARGE

## 2020-10-26 RX ORDER — PREGABALIN 225 MG/1
1 CAPSULE ORAL
Qty: 0 | Refills: 0 | DISCHARGE

## 2020-10-26 RX ORDER — ESCITALOPRAM OXALATE 10 MG/1
1.5 TABLET, FILM COATED ORAL
Qty: 0 | Refills: 0 | DISCHARGE

## 2020-10-26 RX ORDER — FUROSEMIDE 40 MG
1 TABLET ORAL
Qty: 0 | Refills: 0 | DISCHARGE

## 2020-10-26 RX ORDER — METOPROLOL TARTRATE 50 MG
0 TABLET ORAL
Qty: 0 | Refills: 0 | DISCHARGE

## 2020-10-26 RX ADMIN — ESCITALOPRAM OXALATE 10 MILLIGRAM(S): 10 TABLET, FILM COATED ORAL at 12:09

## 2020-10-26 RX ADMIN — CEFTRIAXONE 100 MILLIGRAM(S): 500 INJECTION, POWDER, FOR SOLUTION INTRAMUSCULAR; INTRAVENOUS at 12:03

## 2020-10-26 RX ADMIN — Medication 250 MILLIGRAM(S): at 18:18

## 2020-10-26 RX ADMIN — HEPARIN SODIUM 5000 UNIT(S): 5000 INJECTION INTRAVENOUS; SUBCUTANEOUS at 16:54

## 2020-10-26 RX ADMIN — Medication 3 MILLILITER(S): at 16:39

## 2020-10-26 RX ADMIN — BUPROPION HYDROCHLORIDE 150 MILLIGRAM(S): 150 TABLET, EXTENDED RELEASE ORAL at 12:03

## 2020-10-26 RX ADMIN — Medication 500 MILLIGRAM(S): at 12:03

## 2020-10-26 RX ADMIN — Medication 500 MILLIGRAM(S): at 05:06

## 2020-10-26 RX ADMIN — PREGABALIN 1000 MICROGRAM(S): 225 CAPSULE ORAL at 12:03

## 2020-10-26 RX ADMIN — QUETIAPINE FUMARATE 25 MILLIGRAM(S): 200 TABLET, FILM COATED ORAL at 22:33

## 2020-10-26 RX ADMIN — Medication 25 MILLIGRAM(S): at 18:18

## 2020-10-26 RX ADMIN — Medication 137 MICROGRAM(S): at 05:06

## 2020-10-26 RX ADMIN — Medication 25 MILLIGRAM(S): at 05:06

## 2020-10-26 RX ADMIN — Medication 250 MILLIGRAM(S): at 05:06

## 2020-10-26 RX ADMIN — Medication 81 MILLIGRAM(S): at 12:03

## 2020-10-26 RX ADMIN — TAMSULOSIN HYDROCHLORIDE 0.4 MILLIGRAM(S): 0.4 CAPSULE ORAL at 22:33

## 2020-10-26 RX ADMIN — HEPARIN SODIUM 5000 UNIT(S): 5000 INJECTION INTRAVENOUS; SUBCUTANEOUS at 05:06

## 2020-10-26 RX ADMIN — Medication 500 MILLIGRAM(S): at 22:33

## 2020-10-26 RX ADMIN — Medication 3 MILLILITER(S): at 08:46

## 2020-10-26 RX ADMIN — Medication 5 MILLIGRAM(S): at 22:33

## 2020-10-26 NOTE — PROGRESS NOTE ADULT - SUBJECTIVE AND OBJECTIVE BOX
Subjective:88yMale with urinary retention, abnormal appearing prostate on CT, poss abscess.  Pt resting comfortably, no complaints at this time.  Soto cath in place, draining well    Soto: yellow with moderate amount of sediment    Vital Signs Last 24 Hrs  T(C): 36.7 (26 Oct 2020 07:51), Max: 36.9 (25 Oct 2020 23:08)  T(F): 98.1 (26 Oct 2020 07:51), Max: 98.5 (25 Oct 2020 23:08)  HR: 75 (26 Oct 2020 08:47) (67 - 82)  BP: 115/77 (26 Oct 2020 07:51) (115/77 - 123/70)  BP(mean): --  RR: 18 (26 Oct 2020 07:51) (18 - 18)  SpO2: 97% (26 Oct 2020 08:47) (95% - 98%)  I&O's Detail    25 Oct 2020 07:01  -  26 Oct 2020 07:00  --------------------------------------------------------  IN:    Oral Fluid: 700 mL  Total IN: 700 mL    OUT:    Indwelling Catheter - Urethral (mL): 1200 mL  Total OUT: 1200 mL    Total NET: -500 mL      26 Oct 2020 07:01  -  26 Oct 2020 10:34  --------------------------------------------------------  IN:  Total IN: 0 mL    OUT:    Indwelling Catheter - Urethral (mL): 900 mL  Total OUT: 900 mL    Total NET: -900 mL          Labs:                        9.7    10.33 )-----------( 158      ( 25 Oct 2020 07:39 )             30.7

## 2020-10-26 NOTE — PROGRESS NOTE ADULT - SUBJECTIVE AND OBJECTIVE BOX
[facility]  INFECTIOUS DISEASES AND INTERNAL MEDICINE at [place]  =======================================================  Emile Guthrie MD  Diplomates American Board of Internal Medicine and Infectious Diseases  Tel: [phone]      Fax: [phone]  =======================================================    NAOMI BAXTER [id number]    Follow up: UTI    No fever or chills  No flank pain  No complaints       Allergies:  penicillin (Rash; Hives)      REVIEW OF SYSTEMS:  CONSTITUTIONAL:  No Fever or chills  HEENT:  No diplopia or blurred vision.  No earache, sore throat or runny nose.  CARDIOVASCULAR:  No pressure, squeezing, strangling, tightness, heaviness or aching about the chest, neck, axilla or epigastrium.  RESPIRATORY:  No cough, shortness of breath  GASTROINTESTINAL:  No nausea No vomiting or diarrhea.  GENITOURINARY:  + Soto   MUSCULOSKELETAL:  no joint aches, no muscle pain  SKIN:  No change in skin, hair or nails.  NEUROLOGIC:  No Headaches, seizures  PSYCHIATRIC:  No disorder of thought or mood.  ENDOCRINE:  No heat or cold intolerance  HEMATOLOGICAL:  No easy bruising or bleeding.       Physical Exam:  GEN: NAD, pleasant  HEENT: normocephalic and atraumatic. EOMI. PERRL.  Anicteric  NECK: Supple.   LUNGS: Clear to auscultation.  HEART: Regular rate and rhythm   ABDOMEN: Soft, nontender, and nondistended.  Positive bowel sounds.    : Soto  EXTREMITIES: Without any edema.  MSK: No joint swelling  NEUROLOGIC: No Focal Deficits  PSYCHIATRIC: Appropriate affect .  SKIN: No Rash      Vitals:  T(F): 98.1 (26 Oct 2020 07:51), Max: 98.5 (25 Oct 2020 23:08)  HR: 75 (26 Oct 2020 08:47)  BP: 115/77 (26 Oct 2020 07:51)  RR: 18 (26 Oct 2020 07:51)  SpO2: 97% (26 Oct 2020 08:47) (95% - 98%)  temp max in last 48H T(F): , Max: 98.9 (10-24-20 @ 16:26)      Current Antibiotics:  cefTRIAXone   IVPB 1000 milliGRAM(s) IV Intermittent every 24 hours  metroNIDAZOLE    Tablet 500 milliGRAM(s) Oral every 8 hours      Other medications:  albuterol/ipratropium for Nebulization 3 milliLiter(s) Nebulizer every 6 hours  aspirin  chewable 81 milliGRAM(s) Oral daily  buPROPion XL . 150 milliGRAM(s) Oral daily  cyanocobalamin 1000 MICROGram(s) Oral daily  escitalopram 10 milliGRAM(s) Oral daily  heparin   Injectable 5000 Unit(s) SubCutaneous every 12 hours  levothyroxine 137 MICROGram(s) Oral daily  melatonin 5 milliGRAM(s) Oral at bedtime  metoprolol tartrate 25 milliGRAM(s) Oral two times a day  QUEtiapine 25 milliGRAM(s) Oral at bedtime  saccharomyces boulardii 250 milliGRAM(s) Oral two times a day  tamsulosin 0.4 milliGRAM(s) Oral at bedtime      Labs:             9.7    10.33 )-----------( 158      ( 25 Oct 2020 07:39 )             30.7      Culture - Urine (collected 10-22-20 @ 21:53)  Source: .Urine Clean Catch (Midstream)  Final Report (10-24-20 @ 15:10):    >=3 organisms. Probable collection contamination.    Culture - Blood (collected 10-22-20 @ 12:44)  Source: .Blood Blood-Venous    Culture - Blood (collected 10-22-20 @ 12:44)  Source: .Blood Blood-Venous      WBC Count: 10.33 K/uL (10-25-20 @ 07:39)  WBC Count: 12.71 K/uL (10-24-20 @ 08:53)  WBC Count: 13.14 K/uL (10-23-20 @ 07:12)  WBC Count: 24.68 K/uL (10-22-20 @ 19:18)  WBC Count: 28.32 K/uL (10-22-20 @ 12:15)    Creatinine, Serum: 1.29 mg/dL (10-24-20 @ 08:53)  Creatinine, Serum: 1.28 mg/dL (10-23-20 @ 07:12)  Creatinine, Serum: 1.39 mg/dL (10-22-20 @ 12:15)    Procalcitonin, Serum: 0.38 ng/mL (10-26-20 @ 07:54)    COVID-19 PCR: NotDetec (10-22-20 @ 13:58)  COVID-19 PCR: NotDetec (10-22-20 @ 11:56)  COVID-19 PCR: NotDetec (10-09-20 @ 01:23)

## 2020-10-26 NOTE — PROGRESS NOTE ADULT - SUBJECTIVE AND OBJECTIVE BOX
NAOMI BAXTER  ----------------------------------------  The patient was seen and evaluated for diverticulitis. Offers no complaints. Denied abdominal pain.    Vital Signs Last 24 Hrs  T(C): 36.7 (26 Oct 2020 07:51), Max: 36.9 (25 Oct 2020 23:08)  T(F): 98.1 (26 Oct 2020 07:51), Max: 98.5 (25 Oct 2020 23:08)  HR: 75 (26 Oct 2020 08:47) (67 - 82)  BP: 115/77 (26 Oct 2020 07:51) (115/77 - 123/70)  BP(mean): --  RR: 18 (26 Oct 2020 07:51) (18 - 18)  SpO2: 97% (26 Oct 2020 08:47) (95% - 98%)    PHYSICAL EXAMINATION:  ----------------------------------------  General appearance: No acute distress, Awake, Alert  HEENT: Normocephalic, Atraumatic, Conjunctiva clear, EOMI  Neck: Supple, No JVD, No tenderness  Lungs: Breath sound equal bilaterally, No wheezes, No rales  Cardiovascular: S1S2, Regular rhythm  Abdomen: Soft, Nontender, Nondistended, No guarding/rebound, Positive bowel sounds  Extremities: No clubbing, No cyanosis, No edema, No calf tenderness  Neuro: Strength equal bilaterally, No tremors  Psychiatric: Appropriate mood, Normal affect    LABORATORY STUDIES:  ----------------------------------------             9.7    10.33 )-----------( 158      ( 25 Oct 2020 07:39 )             30.7     MEDICATIONS  (STANDING):  albuterol/ipratropium for Nebulization 3 milliLiter(s) Nebulizer every 6 hours  aspirin  chewable 81 milliGRAM(s) Oral daily  buPROPion XL . 150 milliGRAM(s) Oral daily  cefTRIAXone   IVPB 1000 milliGRAM(s) IV Intermittent every 24 hours  cyanocobalamin 1000 MICROGram(s) Oral daily  escitalopram 10 milliGRAM(s) Oral daily  heparin   Injectable 5000 Unit(s) SubCutaneous every 12 hours  levothyroxine 137 MICROGram(s) Oral daily  melatonin 5 milliGRAM(s) Oral at bedtime  metoprolol tartrate 25 milliGRAM(s) Oral two times a day  metroNIDAZOLE    Tablet 500 milliGRAM(s) Oral every 8 hours  QUEtiapine 25 milliGRAM(s) Oral at bedtime  saccharomyces boulardii 250 milliGRAM(s) Oral two times a day  tamsulosin 0.4 milliGRAM(s) Oral at bedtime    MEDICATIONS  (PRN):  ALBUTerol    90 MICROgram(s) HFA Inhaler 2 Puff(s) Inhalation every 6 hours PRN Shortness of Breath  guaiFENesin   Syrup  (Sugar-Free) 100 milliGRAM(s) Oral every 6 hours PRN Cough      ASSESSMENT / PLAN:  ----------------------------------------  85 year old male from [facility] with hx of anxiety, bipolar, depression, hypothyroidism, asthma/copd, idiopathic thrombocytopenia who presents c/o dysuria, UA positive and noted with UTI. Also noted with LLQ abd pain and ct abd/pelvis showing evidence of mild acute diverticulitis, also noted with possible prostate abscess.    Acute diverticulitis - Mild diverticulitis of the proximal sigmoid colon without abscess. On ceftriaxone and metronidazole. No abdominal tenderness or diarrhea. Afebrile and without leukocytosis. improved. Blood cultures were without growth.    Urine retention - On tamsulosin. Noted difficulty Soto catheter insertion. Urology follow up noted.     Abnormal prostate imaging - For repeat CT to evaluate prostate findings noted on the prior study.    Hypothyroidism - On levothyroxine.    Chronic kidney disease III - Monitoring urine output from Soto catheter. Renal function stable.    COPD - No dyspnea or hypoxia on examination. Inhaled bronchodilator therapy as needed.    Bipolar disorder - On bupropion, escitalopram, and quetiapine. Psychiatry consultation noted, no further intervention recommended. NAOMI BAXTER  ----------------------------------------  The patient was seen and evaluated for diverticulitis. Offers no complaints. Denied abdominal pain.    Vital Signs Last 24 Hrs  T(C): 36.7 (26 Oct 2020 07:51), Max: 36.9 (25 Oct 2020 23:08)  T(F): 98.1 (26 Oct 2020 07:51), Max: 98.5 (25 Oct 2020 23:08)  HR: 75 (26 Oct 2020 08:47) (67 - 82)  BP: 115/77 (26 Oct 2020 07:51) (115/77 - 123/70)  BP(mean): --  RR: 18 (26 Oct 2020 07:51) (18 - 18)  SpO2: 97% (26 Oct 2020 08:47) (95% - 98%)    PHYSICAL EXAMINATION:  ----------------------------------------  General appearance: No acute distress, Awake, Alert  HEENT: Normocephalic, Atraumatic, Conjunctiva clear, EOMI  Neck: Supple, No JVD, No tenderness  Lungs: Breath sound equal bilaterally, No wheezes, No rales  Cardiovascular: S1S2, Regular rhythm  Abdomen: Soft, Nontender, Nondistended, No guarding/rebound, Positive bowel sounds  Extremities: No clubbing, No cyanosis, No edema, No calf tenderness  Neuro: Strength equal bilaterally, No tremors  Psychiatric: Appropriate mood, Normal affect    LABORATORY STUDIES:  ----------------------------------------             9.7    10.33 )-----------( 158      ( 25 Oct 2020 07:39 )             30.7     MEDICATIONS  (STANDING):  albuterol/ipratropium for Nebulization 3 milliLiter(s) Nebulizer every 6 hours  aspirin  chewable 81 milliGRAM(s) Oral daily  buPROPion XL . 150 milliGRAM(s) Oral daily  cefTRIAXone   IVPB 1000 milliGRAM(s) IV Intermittent every 24 hours  cyanocobalamin 1000 MICROGram(s) Oral daily  escitalopram 10 milliGRAM(s) Oral daily  heparin   Injectable 5000 Unit(s) SubCutaneous every 12 hours  levothyroxine 137 MICROGram(s) Oral daily  melatonin 5 milliGRAM(s) Oral at bedtime  metoprolol tartrate 25 milliGRAM(s) Oral two times a day  metroNIDAZOLE    Tablet 500 milliGRAM(s) Oral every 8 hours  QUEtiapine 25 milliGRAM(s) Oral at bedtime  saccharomyces boulardii 250 milliGRAM(s) Oral two times a day  tamsulosin 0.4 milliGRAM(s) Oral at bedtime    MEDICATIONS  (PRN):  ALBUTerol    90 MICROgram(s) HFA Inhaler 2 Puff(s) Inhalation every 6 hours PRN Shortness of Breath  guaiFENesin   Syrup  (Sugar-Free) 100 milliGRAM(s) Oral every 6 hours PRN Cough      ASSESSMENT / PLAN:  ----------------------------------------  85 year old male from [facility] with hx of anxiety, bipolar, depression, hypothyroidism, asthma/copd, idiopathic thrombocytopenia who presents c/o dysuria, UA positive and noted with UTI. Also noted with LLQ abd pain and ct abd/pelvis showing evidence of mild acute diverticulitis, also noted with possible prostate abscess.    Acute diverticulitis - Mild diverticulitis of the proximal sigmoid colon without abscess. On ceftriaxone and metronidazole. No abdominal tenderness or diarrhea. Afebrile and without leukocytosis. improved. Blood cultures were without growth.    Urine retention - On tamsulosin. Noted difficulty Soto catheter insertion. Urology follow up noted.     Abnormal prostate imaging - For repeat CT to evaluate prostate findings noted on the prior study. Patient agreeable to the procedure.    Hypothyroidism - On levothyroxine.    Chronic kidney disease III - Monitoring urine output from Soto catheter. Renal function stable.    COPD - No dyspnea or hypoxia on examination. Inhaled bronchodilator therapy as needed.    Bipolar disorder - On bupropion, escitalopram, and quetiapine. Psychiatry consultation noted, no further intervention recommended.

## 2020-10-27 ENCOUNTER — RESULT REVIEW (OUTPATIENT)
Age: 85
End: 2020-10-27

## 2020-10-27 LAB
ANION GAP SERPL CALC-SCNC: 12 MMOL/L — SIGNIFICANT CHANGE UP (ref 5–17)
BUN SERPL-MCNC: 15 MG/DL — SIGNIFICANT CHANGE UP (ref 8–20)
CALCIUM SERPL-MCNC: 8.2 MG/DL — LOW (ref 8.6–10.2)
CHLORIDE SERPL-SCNC: 100 MMOL/L — SIGNIFICANT CHANGE UP (ref 98–107)
CO2 SERPL-SCNC: 22 MMOL/L — SIGNIFICANT CHANGE UP (ref 22–29)
CREAT SERPL-MCNC: 1.27 MG/DL — SIGNIFICANT CHANGE UP (ref 0.5–1.3)
CULTURE RESULTS: SIGNIFICANT CHANGE UP
CULTURE RESULTS: SIGNIFICANT CHANGE UP
GLUCOSE BLDC GLUCOMTR-MCNC: 101 MG/DL — HIGH (ref 70–99)
GLUCOSE SERPL-MCNC: 87 MG/DL — SIGNIFICANT CHANGE UP (ref 70–99)
POTASSIUM SERPL-MCNC: 4.1 MMOL/L — SIGNIFICANT CHANGE UP (ref 3.5–5.3)
POTASSIUM SERPL-SCNC: 4.1 MMOL/L — SIGNIFICANT CHANGE UP (ref 3.5–5.3)
SODIUM SERPL-SCNC: 134 MMOL/L — LOW (ref 135–145)
SPECIMEN SOURCE: SIGNIFICANT CHANGE UP
SPECIMEN SOURCE: SIGNIFICANT CHANGE UP

## 2020-10-27 PROCEDURE — 99233 SBSQ HOSP IP/OBS HIGH 50: CPT | Mod: 25

## 2020-10-27 PROCEDURE — 99232 SBSQ HOSP IP/OBS MODERATE 35: CPT

## 2020-10-27 PROCEDURE — 88305 TISSUE EXAM BY PATHOLOGIST: CPT | Mod: 26

## 2020-10-27 PROCEDURE — 52700 DRAINAGE OF PROSTATE ABSCESS: CPT

## 2020-10-27 PROCEDURE — 51703 INSERT BLADDER CATH COMPLEX: CPT

## 2020-10-27 RX ORDER — TAMSULOSIN HYDROCHLORIDE 0.4 MG/1
0.4 CAPSULE ORAL AT BEDTIME
Refills: 0 | Status: DISCONTINUED | OUTPATIENT
Start: 2020-10-27 | End: 2020-10-30

## 2020-10-27 RX ORDER — ALBUTEROL 90 UG/1
2 AEROSOL, METERED ORAL EVERY 6 HOURS
Refills: 0 | Status: DISCONTINUED | OUTPATIENT
Start: 2020-10-27 | End: 2020-10-30

## 2020-10-27 RX ORDER — LEVOTHYROXINE SODIUM 125 MCG
137 TABLET ORAL DAILY
Refills: 0 | Status: DISCONTINUED | OUTPATIENT
Start: 2020-10-27 | End: 2020-10-30

## 2020-10-27 RX ORDER — QUETIAPINE FUMARATE 200 MG/1
25 TABLET, FILM COATED ORAL AT BEDTIME
Refills: 0 | Status: DISCONTINUED | OUTPATIENT
Start: 2020-10-27 | End: 2020-10-30

## 2020-10-27 RX ORDER — ONDANSETRON 8 MG/1
4 TABLET, FILM COATED ORAL ONCE
Refills: 0 | Status: DISCONTINUED | OUTPATIENT
Start: 2020-10-27 | End: 2020-10-27

## 2020-10-27 RX ORDER — METRONIDAZOLE 500 MG
500 TABLET ORAL EVERY 8 HOURS
Refills: 0 | Status: DISCONTINUED | OUTPATIENT
Start: 2020-10-27 | End: 2020-10-29

## 2020-10-27 RX ORDER — METOPROLOL TARTRATE 50 MG
25 TABLET ORAL
Refills: 0 | Status: DISCONTINUED | OUTPATIENT
Start: 2020-10-27 | End: 2020-10-30

## 2020-10-27 RX ORDER — PREGABALIN 225 MG/1
1000 CAPSULE ORAL DAILY
Refills: 0 | Status: DISCONTINUED | OUTPATIENT
Start: 2020-10-27 | End: 2020-10-30

## 2020-10-27 RX ORDER — BUPROPION HYDROCHLORIDE 150 MG/1
150 TABLET, EXTENDED RELEASE ORAL DAILY
Refills: 0 | Status: DISCONTINUED | OUTPATIENT
Start: 2020-10-27 | End: 2020-10-30

## 2020-10-27 RX ORDER — SACCHAROMYCES BOULARDII 250 MG
250 POWDER IN PACKET (EA) ORAL
Refills: 0 | Status: DISCONTINUED | OUTPATIENT
Start: 2020-10-27 | End: 2020-10-30

## 2020-10-27 RX ORDER — ACETAMINOPHEN 500 MG
975 TABLET ORAL EVERY 8 HOURS
Refills: 0 | Status: DISCONTINUED | OUTPATIENT
Start: 2020-10-27 | End: 2020-10-30

## 2020-10-27 RX ORDER — ESCITALOPRAM OXALATE 10 MG/1
10 TABLET, FILM COATED ORAL DAILY
Refills: 0 | Status: DISCONTINUED | OUTPATIENT
Start: 2020-10-27 | End: 2020-10-30

## 2020-10-27 RX ORDER — CEFTRIAXONE 500 MG/1
1000 INJECTION, POWDER, FOR SOLUTION INTRAMUSCULAR; INTRAVENOUS EVERY 24 HOURS
Refills: 0 | Status: DISCONTINUED | OUTPATIENT
Start: 2020-10-27 | End: 2020-10-30

## 2020-10-27 RX ORDER — SODIUM CHLORIDE 9 MG/ML
1000 INJECTION, SOLUTION INTRAVENOUS
Refills: 0 | Status: DISCONTINUED | OUTPATIENT
Start: 2020-10-27 | End: 2020-10-27

## 2020-10-27 RX ORDER — FENTANYL CITRATE 50 UG/ML
25 INJECTION INTRAVENOUS
Refills: 0 | Status: DISCONTINUED | OUTPATIENT
Start: 2020-10-27 | End: 2020-10-27

## 2020-10-27 RX ORDER — LANOLIN ALCOHOL/MO/W.PET/CERES
5 CREAM (GRAM) TOPICAL AT BEDTIME
Refills: 0 | Status: DISCONTINUED | OUTPATIENT
Start: 2020-10-27 | End: 2020-10-30

## 2020-10-27 RX ADMIN — TAMSULOSIN HYDROCHLORIDE 0.4 MILLIGRAM(S): 0.4 CAPSULE ORAL at 23:03

## 2020-10-27 RX ADMIN — Medication 500 MILLIGRAM(S): at 23:03

## 2020-10-27 RX ADMIN — FENTANYL CITRATE 25 MICROGRAM(S): 50 INJECTION INTRAVENOUS at 21:09

## 2020-10-27 RX ADMIN — QUETIAPINE FUMARATE 25 MILLIGRAM(S): 200 TABLET, FILM COATED ORAL at 23:03

## 2020-10-27 RX ADMIN — FENTANYL CITRATE 25 MICROGRAM(S): 50 INJECTION INTRAVENOUS at 20:41

## 2020-10-27 RX ADMIN — Medication 500 MILLIGRAM(S): at 05:25

## 2020-10-27 RX ADMIN — ALBUTEROL 2 PUFF(S): 90 AEROSOL, METERED ORAL at 15:18

## 2020-10-27 RX ADMIN — Medication 3 MILLILITER(S): at 09:18

## 2020-10-27 RX ADMIN — Medication 250 MILLIGRAM(S): at 05:25

## 2020-10-27 RX ADMIN — HEPARIN SODIUM 5000 UNIT(S): 5000 INJECTION INTRAVENOUS; SUBCUTANEOUS at 05:25

## 2020-10-27 RX ADMIN — Medication 5 MILLIGRAM(S): at 23:03

## 2020-10-27 RX ADMIN — FENTANYL CITRATE 25 MICROGRAM(S): 50 INJECTION INTRAVENOUS at 20:51

## 2020-10-27 RX ADMIN — Medication 137 MICROGRAM(S): at 05:25

## 2020-10-27 RX ADMIN — Medication 25 MILLIGRAM(S): at 05:26

## 2020-10-27 RX ADMIN — CEFTRIAXONE 100 MILLIGRAM(S): 500 INJECTION, POWDER, FOR SOLUTION INTRAMUSCULAR; INTRAVENOUS at 12:39

## 2020-10-27 NOTE — PROGRESS NOTE ADULT - SUBJECTIVE AND OBJECTIVE BOX
Subjective:88yMale with dysuria on admission, leukocytosis, abnormal appearing prostate on initial CT, poss abscess.  Pt had lester catheter removed last night, has not voided much since then, ~100ml.  Pt states he voided this am, about 50ml in urinal. Pt states he still has the urge to void.      Vital Signs Last 24 Hrs  T(C): 36.8 (27 Oct 2020 07:56), Max: 36.9 (26 Oct 2020 15:23)  T(F): 98.3 (27 Oct 2020 07:56), Max: 98.4 (26 Oct 2020 15:23)  HR: 66 (27 Oct 2020 09:19) (66 - 75)  BP: 113/74 (27 Oct 2020 07:56) (108/62 - 116/68)  BP(mean): --  RR: 18 (27 Oct 2020 07:56) (18 - 18)  SpO2: 96% (27 Oct 2020 09:19) (94% - 98%)  I&O's Detail    26 Oct 2020 07:01  -  27 Oct 2020 07:00  --------------------------------------------------------  IN:  Total IN: 0 mL    OUT:    Indwelling Catheter - Urethral (mL): 900 mL    Voided (mL): 100 mL  Total OUT: 1000 mL    Total NET: -1000 mL          Labs:    10-27    134<L>  |  100  |  15.0  ----------------------------<  87  4.1   |  22.0  |  1.27    Ca    8.2<L>      27 Oct 2020 08:21           Subjective:88yMale with dysuria on admission, leukocytosis, abnormal appearing prostate on initial CT, poss abscess.  Pt had lester catheter removed last night, has not voided much since then, ~100ml.  Pt states he voided this am, about 50ml in urinal. Pt states he still has the urge to void.      Vital Signs Last 24 Hrs  T(C): 36.8 (27 Oct 2020 07:56), Max: 36.9 (26 Oct 2020 15:23)  T(F): 98.3 (27 Oct 2020 07:56), Max: 98.4 (26 Oct 2020 15:23)  HR: 66 (27 Oct 2020 09:19) (66 - 75)  BP: 113/74 (27 Oct 2020 07:56) (108/62 - 116/68)  BP(mean): --  RR: 18 (27 Oct 2020 07:56) (18 - 18)  SpO2: 96% (27 Oct 2020 09:19) (94% - 98%)  I&O's Detail    26 Oct 2020 07:01  -  27 Oct 2020 07:00  --------------------------------------------------------  IN:  Total IN: 0 mL    OUT:    Indwelling Catheter - Urethral (mL): 900 mL    Voided (mL): 100 mL  Total OUT: 1000 mL    Total NET: -1000 mL          Labs:    10-27    134<L>  |  100  |  15.0  ----------------------------<  87  4.1   |  22.0  |  1.27    Ca    8.2<L>      27 Oct 2020 08:21      Radiology:  < from: CT Abdomen and Pelvis w/ Oral Cont and w/ IV Cont (10.26.20 @ 18:05) >  EXAM:  CT ABDOMEN AND PELVIS OC IC                          PROCEDURE DATE:  10/26/2020          INTERPRETATION:  CLINICAL INFORMATION: Possible pelvic abscess. Abnormal appearance of the prostate on noncontrast study. Diverticulosis.    COMPARISON: 10/22/2020    PROCEDURE:  CT of the Abdomen and Pelvis was performed with intravenous contrast.  Intravenous contrast: 90 ml Omnipaque 350. 10 ml discarded.  Oral contrast: positive contrast was administered.  Sagittal and coronal reformats were performed.    FINDINGS:  LOWER CHEST: Evidence of previous descending thoracic aneurysm repair with stent placement. Minimal pleural effusions. Small cyst or pneumatocele at the left lung base. Mild linear scarring at the lung bases  LIVER: Within normal limits.  BILE DUCTS: Normal caliber.  GALLBLADDER: Cholecystectomy.  SPLEEN: Within normal limits.  PANCREAS: Within normal limits.  ADRENALS: Within normal limits.  KIDNEYS/URETERS: Large exophytic upper pole right renal cyst. Additional smaller right upper to midpole cortical cyst    BLADDER: Lester catheter.  REPRODUCTIVE ORGANS: The prostate gland is heterogeneous in attenuation with low attenuation foci in the left side of the prostate land. In the appropriate clinical scenario this may represent prostatic abscess. Please correlate for any recent instrumentation. This area the prostate measures 3.7 cm in length by 3.1 cm transverse by 2.4 cm AP    BOWEL: No bowel obstruction. Appendix is normal.  PERITONEUM: No ascites.  VESSELS: Atherosclerotic changes.  RETROPERITONEUM/LYMPH NODES: No lymphadenopathy.  ABDOMINAL WALL: Within normal limits.  BONES: Degenerative changes with multilevel vacuum phenomenon and spondylosis and facet hypertrophy.    IMPRESSION: Low attenuation foci withinthe left posterior aspect of the prostate gland with enlargement of this part of the prostate gland suggestive of intraprostatic abscess.  Diverticulosis without diverticulitis  Right renal cysts  Status post aortic thoracic aortic aneurysm repair  Cholecystectomy              MAYUR JO MD; Attending Radiologist  This document has been electronically signed. Oct 26 2020  7:38PM    < end of copied text >

## 2020-10-27 NOTE — PROGRESS NOTE ADULT - SUBJECTIVE AND OBJECTIVE BOX
NAOMI BAXTER  ----------------------------------------  The patient was seen and evaluated for diverticulitis. Offers no complaints. Soto catheter previously removed, urine retention noted.    Vital Signs Last 24 Hrs  T(C): 36.8 (27 Oct 2020 07:56), Max: 36.9 (26 Oct 2020 15:23)  T(F): 98.3 (27 Oct 2020 07:56), Max: 98.4 (26 Oct 2020 15:23)  HR: 66 (27 Oct 2020 09:19) (66 - 75)  BP: 113/74 (27 Oct 2020 07:56) (108/62 - 116/68)  BP(mean): --  RR: 18 (27 Oct 2020 07:56) (18 - 18)  SpO2: 96% (27 Oct 2020 09:19) (94% - 98%)    PHYSICAL EXAMINATION:  ----------------------------------------  General appearance: No acute distress, Awake, Alert  HEENT: Normocephalic, Atraumatic, Conjunctiva clear, EOMI  Neck: Supple, No JVD, No tenderness  Lungs: Breath sound equal bilaterally, No wheezes, No rales  Cardiovascular: S1S2, Regular rhythm  Abdomen: Soft, Nontender, Nondistended, No guarding/rebound, Positive bowel sounds  Extremities: No clubbing, No cyanosis, No edema, No calf tenderness  Neuro: Strength equal bilaterally, No tremors  Psychiatric: Appropriate mood, Normal affect    LABORATORY STUDIES:  ----------------------------------------  10-27    134<L>  |  100  |  15.0  ----------------------------<  87  4.1   |  22.0  |  1.27    Ca    8.2<L>      27 Oct 2020 08:21    MEDICATIONS  (STANDING):  albuterol/ipratropium for Nebulization 3 milliLiter(s) Nebulizer every 6 hours  aspirin  chewable 81 milliGRAM(s) Oral daily  buPROPion XL . 150 milliGRAM(s) Oral daily  cefTRIAXone   IVPB 1000 milliGRAM(s) IV Intermittent every 24 hours  cyanocobalamin 1000 MICROGram(s) Oral daily  escitalopram 10 milliGRAM(s) Oral daily  heparin   Injectable 5000 Unit(s) SubCutaneous every 12 hours  levothyroxine 137 MICROGram(s) Oral daily  melatonin 5 milliGRAM(s) Oral at bedtime  metoprolol tartrate 25 milliGRAM(s) Oral two times a day  metroNIDAZOLE    Tablet 500 milliGRAM(s) Oral every 8 hours  QUEtiapine 25 milliGRAM(s) Oral at bedtime  saccharomyces boulardii 250 milliGRAM(s) Oral two times a day  tamsulosin 0.4 milliGRAM(s) Oral at bedtime    MEDICATIONS  (PRN):  ALBUTerol    90 MICROgram(s) HFA Inhaler 2 Puff(s) Inhalation every 6 hours PRN Shortness of Breath  guaiFENesin   Syrup  (Sugar-Free) 100 milliGRAM(s) Oral every 6 hours PRN Cough      ASSESSMENT / PLAN:  ----------------------------------------  85 year old male from [facility] with hx of anxiety, bipolar, depression, hypothyroidism, asthma/copd, idiopathic thrombocytopenia who presents c/o dysuria, UA positive and noted with UTI. Also noted with LLQ abd pain and ct abd/pelvis showing evidence of mild acute diverticulitis, also noted with possible prostate abscess.    Acute diverticulitis - Mild diverticulitis of the proximal sigmoid colon without abscess. On ceftriaxone and metronidazole. Denied any gastrointestinal complaints. Afebrile and without leukocytosis. improved. Blood cultures were without growth.    Urine retention - On tamsulosin. For insertion of Soto catheter.    Abnormal prostate imaging - CT with contrast noted low attenuation foci in the prostate suggestive of an intraprostatic abscess. Urology follow up noted, for review of the study prior to recommendations.    Hypothyroidism - On levothyroxine.    Chronic kidney disease III - Renal function stable.    COPD - No dyspnea or hypoxia on examination. Inhaled bronchodilator therapy as needed.    Bipolar disorder - On bupropion, escitalopram, and quetiapine. Psychiatry consultation noted, no further intervention recommended.

## 2020-10-27 NOTE — PROGRESS NOTE ADULT - SUBJECTIVE AND OBJECTIVE BOX
[facility]  INFECTIOUS DISEASES AND INTERNAL MEDICINE at [place]  =======================================================  Emile Guthrie MD  Diplomates American Board of Internal Medicine and Infectious Diseases  Tel: [phone]      Fax: [phone]  =======================================================    NAOMI BAXTER [id number]    Follow up: UTI    No fever or chills  No flank pain  No complaints       Allergies:  penicillin (Rash; Hives)      REVIEW OF SYSTEMS:  CONSTITUTIONAL:  No Fever or chills  HEENT:  No diplopia or blurred vision.  No earache, sore throat or runny nose.  CARDIOVASCULAR:  No pressure, squeezing, strangling, tightness, heaviness or aching about the chest, neck, axilla or epigastrium.  RESPIRATORY:  No cough, shortness of breath  GASTROINTESTINAL:  No nausea No vomiting or diarrhea.  GENITOURINARY:  no flank pain  MUSCULOSKELETAL:  no joint aches, no muscle pain  SKIN:  No change in skin, hair or nails.  NEUROLOGIC:  No Headaches, seizures  PSYCHIATRIC:  No disorder of thought or mood.  ENDOCRINE:  No heat or cold intolerance  HEMATOLOGICAL:  No easy bruising or bleeding.       Physical Exam:  GEN: NAD, pleasant  HEENT: normocephalic and atraumatic. EOMI. PERRL.  Anicteric  NECK: Supple.   LUNGS: Clear to auscultation.  HEART: Regular rate and rhythm   ABDOMEN: Soft, nontender, and nondistended.  Positive bowel sounds.    : No flank pain  EXTREMITIES: Without any edema.  MSK: No joint swelling  NEUROLOGIC: No Focal Deficits  PSYCHIATRIC: Appropriate affect .  SKIN: No Rash      Vitals:  T(F): 98.1 (26 Oct 2020 07:51), Max: 98.5 (25 Oct 2020 23:08)  HR: 75 (26 Oct 2020 08:47)  BP: 115/77 (26 Oct 2020 07:51)  RR: 18 (26 Oct 2020 07:51)  SpO2: 97% (26 Oct 2020 08:47) (95% - 98%)  temp max in last 48H T(F): , Max: 98.9 (10-24-20 @ 16:26)      Current Antibiotics:  cefTRIAXone   IVPB 1000 milliGRAM(s) IV Intermittent every 24 hours  metroNIDAZOLE    Tablet 500 milliGRAM(s) Oral every 8 hours      Other medications:  albuterol/ipratropium for Nebulization 3 milliLiter(s) Nebulizer every 6 hours  aspirin  chewable 81 milliGRAM(s) Oral daily  buPROPion XL . 150 milliGRAM(s) Oral daily  cyanocobalamin 1000 MICROGram(s) Oral daily  escitalopram 10 milliGRAM(s) Oral daily  heparin   Injectable 5000 Unit(s) SubCutaneous every 12 hours  levothyroxine 137 MICROGram(s) Oral daily  melatonin 5 milliGRAM(s) Oral at bedtime  metoprolol tartrate 25 milliGRAM(s) Oral two times a day  QUEtiapine 25 milliGRAM(s) Oral at bedtime  saccharomyces boulardii 250 milliGRAM(s) Oral two times a day  tamsulosin 0.4 milliGRAM(s) Oral at bedtime      Labs:             9.7    10.33 )-----------( 158      ( 25 Oct 2020 07:39 )             30.7      Culture - Urine (collected 10-22-20 @ 21:53)  Source: .Urine Clean Catch (Midstream)  Final Report (10-24-20 @ 15:10):    >=3 organisms. Probable collection contamination.    Culture - Blood (collected 10-22-20 @ 12:44)  Source: .Blood Blood-Venous    Culture - Blood (collected 10-22-20 @ 12:44)  Source: .Blood Blood-Venous      WBC Count: 10.33 K/uL (10-25-20 @ 07:39)  WBC Count: 12.71 K/uL (10-24-20 @ 08:53)  WBC Count: 13.14 K/uL (10-23-20 @ 07:12)  WBC Count: 24.68 K/uL (10-22-20 @ 19:18)  WBC Count: 28.32 K/uL (10-22-20 @ 12:15)    Creatinine, Serum: 1.29 mg/dL (10-24-20 @ 08:53)  Creatinine, Serum: 1.28 mg/dL (10-23-20 @ 07:12)  Creatinine, Serum: 1.39 mg/dL (10-22-20 @ 12:15)    Procalcitonin, Serum: 0.38 ng/mL (10-26-20 @ 07:54)    COVID-19 PCR: NotDetec (10-22-20 @ 13:58)  COVID-19 PCR: NotDetec (10-22-20 @ 11:56)  COVID-19 PCR: NotDetec (10-09-20 @ 01:23)      < from: CT Abdomen and Pelvis w/ Oral Cont and w/ IV Cont (10.26.20 @ 18:05) >  EXAM:  CT ABDOMEN AND PELVIS OC IC                          PROCEDURE DATE:  10/26/2020          INTERPRETATION:  CLINICAL INFORMATION: Possible pelvic abscess. Abnormal appearance of the prostate on noncontrast study. Diverticulosis.    COMPARISON: 10/22/2020    PROCEDURE:  CT of the Abdomen and Pelvis was performed with intravenous contrast.  Intravenous contrast: 90 ml Omnipaque 350. 10 ml discarded.  Oral contrast: positive contrast was administered.  Sagittal and coronal reformats were performed.    FINDINGS:  LOWER CHEST: Evidence of previous descending thoracic aneurysm repair with stent placement. Minimal pleural effusions. Small cyst or pneumatocele at the left lung base. Mild linear scarring at the lung bases  LIVER: Within normal limits.  BILE DUCTS: Normal caliber.  GALLBLADDER: Cholecystectomy.  SPLEEN: Within normal limits.  PANCREAS: Within normal limits.  ADRENALS: Within normal limits.  KIDNEYS/URETERS: Large exophytic upper pole right renal cyst. Additional smaller right upper to midpole cortical cyst    BLADDER: Soto catheter.  REPRODUCTIVE ORGANS: The prostate gland is heterogeneous in attenuation with low attenuation foci in the left side of the prostate land. In the appropriate clinical scenario this may represent prostatic abscess. Please correlate for any recent instrumentation. This area the prostate measures 3.7 cm in length by 3.1 cm transverse by 2.4 cm AP    BOWEL: No bowel obstruction. Appendix is normal.  PERITONEUM: No ascites.  VESSELS: Atherosclerotic changes.  RETROPERITONEUM/LYMPH NODES: No lymphadenopathy.  ABDOMINAL WALL: Within normal limits.  BONES: Degenerative changes with multilevel vacuum phenomenon and spondylosis and facet hypertrophy.    IMPRESSION: Low attenuation foci withinthe left posterior aspect of the prostate gland with enlargement of this part of the prostate gland suggestive of intraprostatic abscess.  Diverticulosis without diverticulitis  Right renal cysts  Status post aortic thoracic aortic aneurysm repair  Cholecystectomy    < end of copied text >

## 2020-10-27 NOTE — PROCEDURE NOTE - NSURITECHNIQUE_GU_A_CORE
A sterile drape was used to cover all adjacent areas/The catheter was secured with a securement device (e.g. StatLock)/The urinary drainage system is closed at the end of the procedure/All applicable medical record documentation is completed/Sterile gloves were worn for the duration of the procedure/The site was cleaned with soap/water and sterile solution (betadine)/The catheter was appropriately lubricated/Proper hand hygiene was performed/The collection bag is below the level of the patient and urinary bladder
The urinary drainage system is closed at the end of the procedure/The collection bag is below the level of the patient and urinary bladder/Sterile gloves were worn for the duration of the procedure/A sterile drape was used to cover all adjacent areas/The catheter was appropriately lubricated/Proper hand hygiene was performed/The site was cleaned with soap/water and sterile solution (betadine)/The catheter was secured with a securement device (e.g. StatLock)/All applicable medical record documentation is completed

## 2020-10-27 NOTE — CHART NOTE - NSCHARTNOTEFT_GEN_A_CORE
Pt still has not voided, pt uncomfortable  16 Fr coude lester catheter reinserted without difficulty    Explained possible planned procedure for cystoscopy, unroofing of prostatic abscess  pt agrees  keep npo for now  keep lester cath In place

## 2020-10-28 ENCOUNTER — TRANSCRIPTION ENCOUNTER (OUTPATIENT)
Age: 85
End: 2020-10-28

## 2020-10-28 LAB
ANION GAP SERPL CALC-SCNC: 11 MMOL/L — SIGNIFICANT CHANGE UP (ref 5–17)
BUN SERPL-MCNC: 15 MG/DL — SIGNIFICANT CHANGE UP (ref 8–20)
CALCIUM SERPL-MCNC: 8.5 MG/DL — LOW (ref 8.6–10.2)
CHLORIDE SERPL-SCNC: 98 MMOL/L — SIGNIFICANT CHANGE UP (ref 98–107)
CO2 SERPL-SCNC: 23 MMOL/L — SIGNIFICANT CHANGE UP (ref 22–29)
CREAT SERPL-MCNC: 1.3 MG/DL — SIGNIFICANT CHANGE UP (ref 0.5–1.3)
GLUCOSE SERPL-MCNC: 140 MG/DL — HIGH (ref 70–99)
HCT VFR BLD CALC: 31.6 % — LOW (ref 39–50)
HGB BLD-MCNC: 10 G/DL — LOW (ref 13–17)
MCHC RBC-ENTMCNC: 29.4 PG — SIGNIFICANT CHANGE UP (ref 27–34)
MCHC RBC-ENTMCNC: 31.6 GM/DL — LOW (ref 32–36)
MCV RBC AUTO: 92.9 FL — SIGNIFICANT CHANGE UP (ref 80–100)
PLATELET # BLD AUTO: 177 K/UL — SIGNIFICANT CHANGE UP (ref 150–400)
POTASSIUM SERPL-MCNC: 5 MMOL/L — SIGNIFICANT CHANGE UP (ref 3.5–5.3)
POTASSIUM SERPL-SCNC: 5 MMOL/L — SIGNIFICANT CHANGE UP (ref 3.5–5.3)
RBC # BLD: 3.4 M/UL — LOW (ref 4.2–5.8)
RBC # FLD: 16.6 % — HIGH (ref 10.3–14.5)
SODIUM SERPL-SCNC: 131 MMOL/L — LOW (ref 135–145)
WBC # BLD: 7.45 K/UL — SIGNIFICANT CHANGE UP (ref 3.8–10.5)
WBC # FLD AUTO: 7.45 K/UL — SIGNIFICANT CHANGE UP (ref 3.8–10.5)

## 2020-10-28 PROCEDURE — 99232 SBSQ HOSP IP/OBS MODERATE 35: CPT

## 2020-10-28 PROCEDURE — 99233 SBSQ HOSP IP/OBS HIGH 50: CPT

## 2020-10-28 RX ADMIN — TAMSULOSIN HYDROCHLORIDE 0.4 MILLIGRAM(S): 0.4 CAPSULE ORAL at 21:43

## 2020-10-28 RX ADMIN — Medication 250 MILLIGRAM(S): at 05:14

## 2020-10-28 RX ADMIN — Medication 137 MICROGRAM(S): at 05:14

## 2020-10-28 RX ADMIN — Medication 500 MILLIGRAM(S): at 05:14

## 2020-10-28 RX ADMIN — Medication 5 MILLIGRAM(S): at 21:43

## 2020-10-28 RX ADMIN — CEFTRIAXONE 100 MILLIGRAM(S): 500 INJECTION, POWDER, FOR SOLUTION INTRAMUSCULAR; INTRAVENOUS at 12:03

## 2020-10-28 RX ADMIN — Medication 500 MILLIGRAM(S): at 14:55

## 2020-10-28 RX ADMIN — PREGABALIN 1000 MICROGRAM(S): 225 CAPSULE ORAL at 12:03

## 2020-10-28 RX ADMIN — ESCITALOPRAM OXALATE 10 MILLIGRAM(S): 10 TABLET, FILM COATED ORAL at 12:03

## 2020-10-28 RX ADMIN — ALBUTEROL 2 PUFF(S): 90 AEROSOL, METERED ORAL at 14:50

## 2020-10-28 RX ADMIN — Medication 25 MILLIGRAM(S): at 05:14

## 2020-10-28 RX ADMIN — ALBUTEROL 2 PUFF(S): 90 AEROSOL, METERED ORAL at 08:50

## 2020-10-28 RX ADMIN — QUETIAPINE FUMARATE 25 MILLIGRAM(S): 200 TABLET, FILM COATED ORAL at 21:43

## 2020-10-28 RX ADMIN — BUPROPION HYDROCHLORIDE 150 MILLIGRAM(S): 150 TABLET, EXTENDED RELEASE ORAL at 12:03

## 2020-10-28 RX ADMIN — Medication 500 MILLIGRAM(S): at 21:43

## 2020-10-28 RX ADMIN — Medication 250 MILLIGRAM(S): at 17:29

## 2020-10-28 NOTE — DISCHARGE NOTE NURSING/CASE MANAGEMENT/SOCIAL WORK - PATIENT PORTAL LINK FT
You can access the FollowMyHealth Patient Portal offered by [facility] by registering at the following website: [email or web address]. By joining [facility]’s FollowMyHealth portal, you will also be able to view your health information using other applications (apps) compatible with our system.

## 2020-10-28 NOTE — PROGRESS NOTE ADULT - SUBJECTIVE AND OBJECTIVE BOX
Post-op Check    Subjective:  Pt offers no acute complaints at this time. Pain well controlled on current regiment. Denies chest pain, SOB, palpitations. Patient states " I'm feeling better, the burning sensation is going away" CBI running, very light pink tinged urine.     STATUS POST:  Drainage, abscess, prostate, transurethral    POST OPERATIVE DAY #: 0    MEDICATIONS  (STANDING):  buPROPion XL . 150 milliGRAM(s) Oral daily  cefTRIAXone   IVPB 1000 milliGRAM(s) IV Intermittent every 24 hours  cyanocobalamin 1000 MICROGram(s) Oral daily  escitalopram 10 milliGRAM(s) Oral daily  levothyroxine 137 MICROGram(s) Oral daily  melatonin 5 milliGRAM(s) Oral at bedtime  metoprolol tartrate 25 milliGRAM(s) Oral two times a day  metroNIDAZOLE    Tablet 500 milliGRAM(s) Oral every 8 hours  QUEtiapine 25 milliGRAM(s) Oral at bedtime  saccharomyces boulardii 250 milliGRAM(s) Oral two times a day  tamsulosin 0.4 milliGRAM(s) Oral at bedtime    MEDICATIONS  (PRN):  acetaminophen   Tablet .. 975 milliGRAM(s) Oral every 8 hours PRN Mild Pain (1 - 3)  ALBUTerol    90 MICROgram(s) HFA Inhaler 2 Puff(s) Inhalation every 6 hours PRN Shortness of Breath  guaiFENesin   Syrup  (Sugar-Free) 100 milliGRAM(s) Oral every 6 hours PRN Cough      Vital Signs Last 24 Hrs  T(C): 36.7 (27 Oct 2020 22:02), Max: 37.1 (27 Oct 2020 18:54)  T(F): 98.1 (27 Oct 2020 22:02), Max: 98.8 (27 Oct 2020 18:54)  HR: 91 (27 Oct 2020 23:05) (66 - 91)  BP: 131/76 (27 Oct 2020 23:05) (106/57 - 132/57)  BP(mean): 69 (27 Oct 2020 21:30) (64 - 81)  RR: 17 (27 Oct 2020 23:05) (14 - 19)  SpO2: 96% (27 Oct 2020 23:05) (94% - 100%)    Physical Exam:    Constitutional: NAD  HEENT: PERRL, EOMI  Neck: No JVD, FROM without pain  Respiratory: no accessory muscle use, respirations non-labored  GI: abdomen soft, non-tender, non-distended  : CBI running, very light pink tinged urine  Neurological: A&O x 3; without gross deficit    A:     P:  Continue current care  Pain control  OOB as tolerated  Encourage IS  continue cbi for now  DVT ppx

## 2020-10-28 NOTE — DISCHARGE NOTE NURSING/CASE MANAGEMENT/SOCIAL WORK - NSDCFUADDAPPT_GEN_ALL_CORE_FT
Pt does not meet STAR criteria. No appointment has been made.  Pt was not treated for COPD this admission.

## 2020-10-28 NOTE — PROGRESS NOTE ADULT - SUBJECTIVE AND OBJECTIVE BOX
[facility]  INFECTIOUS DISEASES AND INTERNAL MEDICINE at [place]  =======================================================  Emile Guthrie MD  Diplomates American Board of Internal Medicine and Infectious Diseases  Tel: [phone]      Fax: [phone]  =======================================================    NAOMI BAXTER [id number]    Follow up: UTI    No fever or chills  No flank pain  No complaints       Allergies:  penicillin (Rash; Hives)      REVIEW OF SYSTEMS:  CONSTITUTIONAL:  No Fever or chills  HEENT:  No diplopia or blurred vision.  No earache, sore throat or runny nose.  CARDIOVASCULAR:  No pressure, squeezing, strangling, tightness, heaviness or aching about the chest, neck, axilla or epigastrium.  RESPIRATORY:  No cough, shortness of breath  GASTROINTESTINAL:  No nausea No vomiting or diarrhea.  GENITOURINARY:  no flank pain  MUSCULOSKELETAL:  no joint aches, no muscle pain  SKIN:  No change in skin, hair or nails.  NEUROLOGIC:  No Headaches, seizures  PSYCHIATRIC:  No disorder of thought or mood.  ENDOCRINE:  No heat or cold intolerance  HEMATOLOGICAL:  No easy bruising or bleeding.       Physical Exam:  GEN: NAD, pleasant  HEENT: normocephalic and atraumatic. EOMI. PERRL.  Anicteric  NECK: Supple.   LUNGS: Clear to auscultation.  HEART: Regular rate and rhythm   ABDOMEN: Soft, nontender, and nondistended.  Positive bowel sounds.    : No flank pain  EXTREMITIES: Without any edema.  MSK: No joint swelling  NEUROLOGIC: No Focal Deficits  PSYCHIATRIC: Appropriate affect .  SKIN: No Rash      Vitals:  T(F): 97.7 (28 Oct 2020 08:00), Max: 98.8 (27 Oct 2020 18:54)  HR: 76 (28 Oct 2020 08:51)  BP: 114/70 (28 Oct 2020 08:00)  RR: 18 (28 Oct 2020 08:00)  SpO2: 96% (28 Oct 2020 08:51) (93% - 100%)  temp max in last 48H T(F): , Max: 98.8 (10-27-20 @ 18:54)      Current Antibiotics:  cefTRIAXone   IVPB 1000 milliGRAM(s) IV Intermittent every 24 hours  metroNIDAZOLE    Tablet 500 milliGRAM(s) Oral every 8 hours      Other medications:  buPROPion XL . 150 milliGRAM(s) Oral daily  cyanocobalamin 1000 MICROGram(s) Oral daily  escitalopram 10 milliGRAM(s) Oral daily  levothyroxine 137 MICROGram(s) Oral daily  melatonin 5 milliGRAM(s) Oral at bedtime  metoprolol tartrate 25 milliGRAM(s) Oral two times a day  QUEtiapine 25 milliGRAM(s) Oral at bedtime  saccharomyces boulardii 250 milliGRAM(s) Oral two times a day  tamsulosin 0.4 milliGRAM(s) Oral at bedtime      Labs:             10.0   7.45  )-----------( 177      ( 28 Oct 2020 08:08 )             31.6      10-28    131<L>  |  98  |  15.0  ----------------------------<  140<H>  5.0   |  23.0  |  1.30    Ca    8.5<L>      28 Oct 2020 08:08        Culture - Urine (collected 10-22-20 @ 21:53)  Source: .Urine Clean Catch (Midstream)  Final Report (10-24-20 @ 15:10):    >=3 organisms. Probable collection contamination.    Culture - Blood (collected 10-22-20 @ 12:44)  Source: .Blood Blood-Venous  Final Report (10-27-20 @ 13:01):    No growth at 5 days.    Culture - Blood (collected 10-22-20 @ 12:44)  Source: .Blood Blood-Venous  Final Report (10-27-20 @ 13:01):    No growth at 5 days.      WBC Count: 7.45 K/uL (10-28-20 @ 08:08)  WBC Count: 10.33 K/uL (10-25-20 @ 07:39)  WBC Count: 12.71 K/uL (10-24-20 @ 08:53)    Creatinine, Serum: 1.30 mg/dL (10-28-20 @ 08:08)  Creatinine, Serum: 1.27 mg/dL (10-27-20 @ 08:21)  Creatinine, Serum: 1.29 mg/dL (10-24-20 @ 08:53)    Procalcitonin, Serum: 0.38 ng/mL (10-26-20 @ 07:54)    COVID-19 PCR: NotDetec (10-26-20 @ 12:36)  COVID-19 IgG Antibody Index: <0.10 Index (10-23-20 @ 04:24)  COVID-19 IgG Antibody Interpretation: Negative (10-23-20 @ 04:24)  COVID-19 PCR: NotDetec (10-22-20 @ 13:58)  COVID-19 PCR: NotDetec (10-22-20 @ 11:56)  COVID-19 IgG Antibody Interpretation: Negative (10-09-20 @ 15:19)  COVID-19 IgG Antibody Index: <0.10 Index (10-09-20 @ 15:19)  COVID-19 PCR: NotDetec (10-09-20 @ 01:23)        < from: CT Abdomen and Pelvis w/ Oral Cont and w/ IV Cont (10.26.20 @ 18:05) >  EXAM:  CT ABDOMEN AND PELVIS OC IC                          PROCEDURE DATE:  10/26/2020      INTERPRETATION:  CLINICAL INFORMATION: Possible pelvic abscess. Abnormal appearance of the prostate on noncontrast study. Diverticulosis.    COMPARISON: 10/22/2020    PROCEDURE:  CT of the Abdomen and Pelvis was performed with intravenous contrast.  Intravenous contrast: 90 ml Omnipaque 350. 10 ml discarded.  Oral contrast: positive contrast was administered.  Sagittal and coronal reformats were performed.    FINDINGS:  LOWER CHEST: Evidence of previous descending thoracic aneurysm repair with stent placement. Minimal pleural effusions. Small cyst or pneumatocele at the left lung base. Mild linear scarring at the lung bases  LIVER: Within normal limits.  BILE DUCTS: Normal caliber.  GALLBLADDER: Cholecystectomy.  SPLEEN: Within normal limits.  PANCREAS: Within normal limits.  ADRENALS: Within normal limits.  KIDNEYS/URETERS: Large exophytic upper pole right renal cyst. Additional smaller right upper to midpole cortical cyst    BLADDER: Soto catheter.  REPRODUCTIVE ORGANS: The prostate gland is heterogeneous in attenuation with low attenuation foci in the left side of the prostate land. In the appropriate clinical scenario this may represent prostatic abscess. Please correlate for any recent instrumentation. This area the prostate measures 3.7 cm in length by 3.1 cm transverse by 2.4 cm AP    BOWEL: No bowel obstruction. Appendix is normal.  PERITONEUM: No ascites.  VESSELS: Atherosclerotic changes.  RETROPERITONEUM/LYMPH NODES: No lymphadenopathy.  ABDOMINAL WALL: Within normal limits.  BONES: Degenerative changes with multilevel vacuum phenomenon and spondylosis and facet hypertrophy.    IMPRESSION: Low attenuation foci withinthe left posterior aspect of the prostate gland with enlargement of this part of the prostate gland suggestive of intraprostatic abscess.  Diverticulosis without diverticulitis  Right renal cysts  Status post aortic thoracic aortic aneurysm repair  Cholecystectomy    < end of copied text >

## 2020-10-28 NOTE — PROGRESS NOTE ADULT - SUBJECTIVE AND OBJECTIVE BOX
Subjective:88yMale pt with multiple medical issues, UTI, urinary retention, POD#1 s/p cystoscopy, unroofing of prostatic abscess.  Pt resting comfortably, no c/o pain, no rectal pain.  3 way lester cath in place, cbi on at slow rate. Urine light pink in color with some sediment.    [place]: light pink, sediment    Vital Signs Last 24 Hrs  T(C): 36.5 (28 Oct 2020 08:00), Max: 37.1 (27 Oct 2020 18:54)  T(F): 97.7 (28 Oct 2020 08:00), Max: 98.8 (27 Oct 2020 18:54)  HR: 79 (28 Oct 2020 08:00) (66 - 91)  BP: 114/70 (28 Oct 2020 08:00) (106/57 - 132/57)  BP(mean): 69 (27 Oct 2020 21:30) (64 - 81)  RR: 18 (28 Oct 2020 08:00) (14 - 19)  SpO2: 93% (28 Oct 2020 08:00) (93% - 100%)  I&O's Detail    27 Oct 2020 07:01  -  28 Oct 2020 07:00  --------------------------------------------------------  IN:    Continuous Bladder Irrigation (mL): 3100 mL  Total IN: 3100 mL    OUT:    Continuous Bladder Irrigation (mL): 6650 mL  Total OUT: 6650 mL    Total NET: -3550 mL          Labs:    10-27    134<L>  |  100  |  15.0  ----------------------------<  87  4.1   |  22.0  |  1.27    Ca    8.2<L>      27 Oct 2020 08:21

## 2020-10-28 NOTE — PROGRESS NOTE ADULT - SUBJECTIVE AND OBJECTIVE BOX
NAOMI BAXTER  ----------------------------------------  The patient was seen and evaluated for prostate abscess. Somnolent. Offers no complaints.    Vital Signs Last 24 Hrs  T(C): 36.5 (28 Oct 2020 08:00), Max: 37.1 (27 Oct 2020 18:54)  T(F): 97.7 (28 Oct 2020 08:00), Max: 98.8 (27 Oct 2020 18:54)  HR: 76 (28 Oct 2020 08:51) (76 - 91)  BP: 114/70 (28 Oct 2020 08:00) (106/57 - 132/57)  BP(mean): 69 (27 Oct 2020 21:30) (64 - 81)  RR: 18 (28 Oct 2020 08:00) (14 - 19)  SpO2: 96% (28 Oct 2020 08:51) (93% - 100%)    CAPILLARY BLOOD GLUCOSE  POCT Blood Glucose.: 101 mg/dL (27 Oct 2020 16:54)    PHYSICAL EXAMINATION:  ----------------------------------------  General appearance: No acute distress, Awake, Alert  HEENT: Normocephalic, Atraumatic, Conjunctiva clear, EOMI  Neck: Supple, No JVD, No tenderness  Lungs: Breath sound equal bilaterally, No wheezes, No rales  Cardiovascular: S1S2, Regular rhythm  Abdomen: Soft, Nontender, Nondistended, No guarding/rebound, Positive bowel sounds  Extremities: No clubbing, No cyanosis, No edema, No calf tenderness  Neuro: Strength equal bilaterally, No tremors  Psychiatric: Appropriate mood, Normal affect    LABORATORY STUDIES:  ----------------------------------------             10.0   7.45  )-----------( 177      ( 28 Oct 2020 08:08 )             31.6     10-28    131<L>  |  98  |  15.0  ----------------------------<  140<H>  5.0   |  23.0  |  1.30    Ca    8.5<L>      28 Oct 2020 08:08    MEDICATIONS  (STANDING):  buPROPion XL . 150 milliGRAM(s) Oral daily  cefTRIAXone   IVPB 1000 milliGRAM(s) IV Intermittent every 24 hours  cyanocobalamin 1000 MICROGram(s) Oral daily  escitalopram 10 milliGRAM(s) Oral daily  levothyroxine 137 MICROGram(s) Oral daily  melatonin 5 milliGRAM(s) Oral at bedtime  metoprolol tartrate 25 milliGRAM(s) Oral two times a day  metroNIDAZOLE    Tablet 500 milliGRAM(s) Oral every 8 hours  QUEtiapine 25 milliGRAM(s) Oral at bedtime  saccharomyces boulardii 250 milliGRAM(s) Oral two times a day  tamsulosin 0.4 milliGRAM(s) Oral at bedtime    MEDICATIONS  (PRN):  acetaminophen   Tablet .. 975 milliGRAM(s) Oral every 8 hours PRN Mild Pain (1 - 3)  ALBUTerol    90 MICROgram(s) HFA Inhaler 2 Puff(s) Inhalation every 6 hours PRN Shortness of Breath  guaiFENesin   Syrup  (Sugar-Free) 100 milliGRAM(s) Oral every 6 hours PRN Cough      ASSESSMENT / PLAN:  ----------------------------------------  85 year old male from [facility] with hx of anxiety, bipolar, depression, hypothyroidism, asthma/copd, idiopathic thrombocytopenia who presents c/o dysuria, UA positive and noted with UTI. Also noted with LLQ abd pain and ct abd/pelvis showing evidence of mild acute diverticulitis, also noted with possible prostate abscess.    Prostate abscess - Status post cystoscopy and unroofing of prostate abscess. Continuous bladder irrigation in progress. Urology follow up noted. On ceftriaxone and metronidazole.    Acute diverticulitis - Mild diverticulitis of the proximal sigmoid colon without abscess. On ceftriaxone and metronidazole. Denied any gastrointestinal complaints. Afebrile and without leukocytosis. improved. Blood cultures were without growth.    Urine retention - On tamsulosin. Continuous bladder irrigation in progress.    Hypothyroidism - On levothyroxine.    Hyponatremia - Repeat laboratory studies ordered to monitor.    Chronic kidney disease III - Renal function stable.    COPD - No dyspnea or hypoxia on examination. Inhaled bronchodilator therapy as needed.    Bipolar disorder - On bupropion, escitalopram, and quetiapine. Psychiatry consultation noted, no further intervention recommended. NAOMI BAXTER  ----------------------------------------  The patient was seen and evaluated for prostate abscess. Somnolent. Offers no complaints.    Vital Signs Last 24 Hrs  T(C): 36.5 (28 Oct 2020 08:00), Max: 37.1 (27 Oct 2020 18:54)  T(F): 97.7 (28 Oct 2020 08:00), Max: 98.8 (27 Oct 2020 18:54)  HR: 76 (28 Oct 2020 08:51) (76 - 91)  BP: 114/70 (28 Oct 2020 08:00) (106/57 - 132/57)  BP(mean): 69 (27 Oct 2020 21:30) (64 - 81)  RR: 18 (28 Oct 2020 08:00) (14 - 19)  SpO2: 96% (28 Oct 2020 08:51) (93% - 100%)    CAPILLARY BLOOD GLUCOSE  POCT Blood Glucose.: 101 mg/dL (27 Oct 2020 16:54)    PHYSICAL EXAMINATION:  ----------------------------------------  General appearance: No acute distress, Awake, Alert  HEENT: Normocephalic, Atraumatic, Conjunctiva clear, EOMI  Neck: Supple, No JVD, No tenderness  Lungs: Breath sound equal bilaterally, No wheezes, No rales  Cardiovascular: S1S2, Regular rhythm  Abdomen: Soft, Nontender, Nondistended, No guarding/rebound, Positive bowel sounds  Extremities: No clubbing, No cyanosis, No edema, No calf tenderness  Neuro: Strength equal bilaterally, No tremors  Psychiatric: Appropriate mood, Normal affect    LABORATORY STUDIES:  ----------------------------------------             10.0   7.45  )-----------( 177      ( 28 Oct 2020 08:08 )             31.6     10-28    131<L>  |  98  |  15.0  ----------------------------<  140<H>  5.0   |  23.0  |  1.30    Ca    8.5<L>      28 Oct 2020 08:08    MEDICATIONS  (STANDING):  buPROPion XL . 150 milliGRAM(s) Oral daily  cefTRIAXone   IVPB 1000 milliGRAM(s) IV Intermittent every 24 hours  cyanocobalamin 1000 MICROGram(s) Oral daily  escitalopram 10 milliGRAM(s) Oral daily  levothyroxine 137 MICROGram(s) Oral daily  melatonin 5 milliGRAM(s) Oral at bedtime  metoprolol tartrate 25 milliGRAM(s) Oral two times a day  metroNIDAZOLE    Tablet 500 milliGRAM(s) Oral every 8 hours  QUEtiapine 25 milliGRAM(s) Oral at bedtime  saccharomyces boulardii 250 milliGRAM(s) Oral two times a day  tamsulosin 0.4 milliGRAM(s) Oral at bedtime    MEDICATIONS  (PRN):  acetaminophen   Tablet .. 975 milliGRAM(s) Oral every 8 hours PRN Mild Pain (1 - 3)  ALBUTerol    90 MICROgram(s) HFA Inhaler 2 Puff(s) Inhalation every 6 hours PRN Shortness of Breath  guaiFENesin   Syrup  (Sugar-Free) 100 milliGRAM(s) Oral every 6 hours PRN Cough      ASSESSMENT / PLAN:  ----------------------------------------  85 year old male from [facility] with hx of anxiety, bipolar, depression, hypothyroidism, asthma/copd, idiopathic thrombocytopenia who presents c/o dysuria, UA positive and noted with UTI. Also noted with LLQ abd pain and ct abd/pelvis showing evidence of mild acute diverticulitis, also noted with possible prostate abscess.    Prostate abscess - Status post cystoscopy and unroofing of prostate abscess. Continuous bladder irrigation in progress. Urology follow up noted. On ceftriaxone and metronidazole.    Acute diverticulitis - Mild diverticulitis of the proximal sigmoid colon without abscess. On ceftriaxone and metronidazole. Denied any gastrointestinal complaints. Afebrile and without leukocytosis. improved. Blood cultures were without growth.    Urine retention - On tamsulosin. Continuous bladder irrigation in progress.    Hypothyroidism - On levothyroxine.    Hyponatremia - Repeat laboratory studies ordered to monitor.    Chronic kidney disease III - Renal function stable.    COPD - No dyspnea or hypoxia on examination. Inhaled bronchodilator therapy as needed.    Bipolar disorder - On bupropion, escitalopram, and quetiapine. Psychiatry consultation noted, no further intervention recommended.    Discussed with the patient's wife on the telephone.

## 2020-10-29 LAB
SARS-COV-2 RNA SPEC QL NAA+PROBE: SIGNIFICANT CHANGE UP
SURGICAL PATHOLOGY STUDY: SIGNIFICANT CHANGE UP

## 2020-10-29 PROCEDURE — 99024 POSTOP FOLLOW-UP VISIT: CPT

## 2020-10-29 PROCEDURE — 76942 ECHO GUIDE FOR BIOPSY: CPT | Mod: 26,59

## 2020-10-29 PROCEDURE — 99232 SBSQ HOSP IP/OBS MODERATE 35: CPT

## 2020-10-29 PROCEDURE — 76937 US GUIDE VASCULAR ACCESS: CPT | Mod: 26,59

## 2020-10-29 RX ORDER — HEPARIN SODIUM 5000 [USP'U]/ML
5000 INJECTION INTRAVENOUS; SUBCUTANEOUS EVERY 12 HOURS
Refills: 0 | Status: DISCONTINUED | OUTPATIENT
Start: 2020-10-29 | End: 2020-10-30

## 2020-10-29 RX ORDER — SODIUM CHLORIDE 9 MG/ML
1 INJECTION INTRAMUSCULAR; INTRAVENOUS; SUBCUTANEOUS THREE TIMES A DAY
Refills: 0 | Status: COMPLETED | OUTPATIENT
Start: 2020-10-29 | End: 2020-10-30

## 2020-10-29 RX ADMIN — QUETIAPINE FUMARATE 25 MILLIGRAM(S): 200 TABLET, FILM COATED ORAL at 21:04

## 2020-10-29 RX ADMIN — Medication 975 MILLIGRAM(S): at 05:38

## 2020-10-29 RX ADMIN — Medication 137 MICROGRAM(S): at 05:38

## 2020-10-29 RX ADMIN — Medication 250 MILLIGRAM(S): at 05:38

## 2020-10-29 RX ADMIN — Medication 25 MILLIGRAM(S): at 17:33

## 2020-10-29 RX ADMIN — Medication 500 MILLIGRAM(S): at 05:38

## 2020-10-29 RX ADMIN — ALBUTEROL 2 PUFF(S): 90 AEROSOL, METERED ORAL at 09:37

## 2020-10-29 RX ADMIN — CEFTRIAXONE 100 MILLIGRAM(S): 500 INJECTION, POWDER, FOR SOLUTION INTRAMUSCULAR; INTRAVENOUS at 10:35

## 2020-10-29 RX ADMIN — Medication 5 MILLIGRAM(S): at 21:04

## 2020-10-29 RX ADMIN — PREGABALIN 1000 MICROGRAM(S): 225 CAPSULE ORAL at 11:38

## 2020-10-29 RX ADMIN — BUPROPION HYDROCHLORIDE 150 MILLIGRAM(S): 150 TABLET, EXTENDED RELEASE ORAL at 11:36

## 2020-10-29 RX ADMIN — ESCITALOPRAM OXALATE 10 MILLIGRAM(S): 10 TABLET, FILM COATED ORAL at 11:37

## 2020-10-29 RX ADMIN — HEPARIN SODIUM 5000 UNIT(S): 5000 INJECTION INTRAVENOUS; SUBCUTANEOUS at 17:33

## 2020-10-29 RX ADMIN — SODIUM CHLORIDE 1 GRAM(S): 9 INJECTION INTRAMUSCULAR; INTRAVENOUS; SUBCUTANEOUS at 21:04

## 2020-10-29 RX ADMIN — Medication 250 MILLIGRAM(S): at 17:33

## 2020-10-29 RX ADMIN — TAMSULOSIN HYDROCHLORIDE 0.4 MILLIGRAM(S): 0.4 CAPSULE ORAL at 21:04

## 2020-10-29 NOTE — PROCEDURE NOTE - NSSITEPREP_SKIN_A_CORE
povidone iodine (if allergic to chlorhexidine)
povidone iodine (if allergic to chlorhexidine)
Adherence to aseptic technique: hand hygiene prior to donning barriers (gown, gloves), don cap and mask, sterile drape over patient/chlorhexidine

## 2020-10-29 NOTE — PROCEDURE NOTE - ADDITIONAL PROCEDURE DETAILS
urojet was used for local and lubrication, catheter inserted without difficulty  foreskin reduced
18G 10CM 35CIRC BARD POWER MIDLINE good heme back ns flush left basilic vein

## 2020-10-29 NOTE — PROGRESS NOTE ADULT - SUBJECTIVE AND OBJECTIVE BOX
NAOMI BAXTER  ----------------------------------------  The patient was seen and evaluated for prostate abscess. Offers no complaints.    Vital Signs Last 24 Hrs  T(C): 36.4 (29 Oct 2020 08:56), Max: 36.7 (28 Oct 2020 23:06)  T(F): 97.5 (29 Oct 2020 08:56), Max: 98 (28 Oct 2020 23:06)  HR: 76 (29 Oct 2020 09:38) (69 - 78)  BP: 111/66 (29 Oct 2020 08:56) (104/60 - 111/66)  BP(mean): --  RR: 18 (29 Oct 2020 08:56) (18 - 18)  SpO2: 97% (29 Oct 2020 09:38) (92% - 97%)    PHYSICAL EXAMINATION:  ----------------------------------------  General appearance: No acute distress, Awake, Alert  HEENT: Normocephalic, Atraumatic, Conjunctiva clear, EOMI  Neck: Supple, No JVD, No tenderness  Lungs: Breath sound equal bilaterally, No wheezes, No rales  Cardiovascular: S1S2, Regular rhythm  Abdomen: Soft, Nontender, Nondistended, No guarding/rebound, Positive bowel sounds  Extremities: No clubbing, No cyanosis, No edema, No calf tenderness  Neuro: Strength equal bilaterally, No tremors  Psychiatric: Appropriate mood, Normal affect    LABORATORY STUDIES:  ----------------------------------------             10.0   7.45  )-----------( 177      ( 28 Oct 2020 08:08 )             31.6     10-28    131<L>  |  98  |  15.0  ----------------------------<  140<H>  5.0   |  23.0  |  1.30    Ca    8.5<L>      28 Oct 2020 08:08                  MEDICATIONS  (STANDING):  buPROPion XL . 150 milliGRAM(s) Oral daily  cefTRIAXone   IVPB 1000 milliGRAM(s) IV Intermittent every 24 hours  cyanocobalamin 1000 MICROGram(s) Oral daily  escitalopram 10 milliGRAM(s) Oral daily  heparin   Injectable 5000 Unit(s) SubCutaneous every 12 hours  levothyroxine 137 MICROGram(s) Oral daily  melatonin 5 milliGRAM(s) Oral at bedtime  metoprolol tartrate 25 milliGRAM(s) Oral two times a day  QUEtiapine 25 milliGRAM(s) Oral at bedtime  saccharomyces boulardii 250 milliGRAM(s) Oral two times a day  tamsulosin 0.4 milliGRAM(s) Oral at bedtime    MEDICATIONS  (PRN):  acetaminophen   Tablet .. 975 milliGRAM(s) Oral every 8 hours PRN Mild Pain (1 - 3)  ALBUTerol    90 MICROgram(s) HFA Inhaler 2 Puff(s) Inhalation every 6 hours PRN Shortness of Breath  guaiFENesin   Syrup  (Sugar-Free) 100 milliGRAM(s) Oral every 6 hours PRN Cough      ASSESSMENT / PLAN:  ----------------------------------------  Anticipate discharge in 24-48 hours NAOMI BAXTER  ----------------------------------------  The patient was seen and evaluated for prostate abscess. Offers no complaints.    Vital Signs Last 24 Hrs  T(C): 36.4 (29 Oct 2020 08:56), Max: 36.7 (28 Oct 2020 23:06)  T(F): 97.5 (29 Oct 2020 08:56), Max: 98 (28 Oct 2020 23:06)  HR: 76 (29 Oct 2020 09:38) (69 - 78)  BP: 111/66 (29 Oct 2020 08:56) (104/60 - 111/66)  BP(mean): --  RR: 18 (29 Oct 2020 08:56) (18 - 18)  SpO2: 97% (29 Oct 2020 09:38) (92% - 97%)    PHYSICAL EXAMINATION:  ----------------------------------------  General appearance: No acute distress, Awake, Alert  HEENT: Normocephalic, Atraumatic, Conjunctiva clear, EOMI  Neck: Supple, No JVD, No tenderness  Lungs: Breath sound equal bilaterally, No wheezes, No rales  Cardiovascular: S1S2, Regular rhythm  Abdomen: Soft, Nontender, Nondistended, No guarding/rebound, Positive bowel sounds  Extremities: No clubbing, No cyanosis, No edema, No calf tenderness  Neuro: Strength equal bilaterally, No tremors  Psychiatric: Appropriate mood, Normal affect    LABORATORY STUDIES:  ----------------------------------------             10.0   7.45  )-----------( 177      ( 28 Oct 2020 08:08 )             31.6     10-28    131<L>  |  98  |  15.0  ----------------------------<  140<H>  5.0   |  23.0  |  1.30    Ca    8.5<L>      28 Oct 2020 08:08    MEDICATIONS  (STANDING):  buPROPion XL . 150 milliGRAM(s) Oral daily  cefTRIAXone   IVPB 1000 milliGRAM(s) IV Intermittent every 24 hours  cyanocobalamin 1000 MICROGram(s) Oral daily  escitalopram 10 milliGRAM(s) Oral daily  heparin   Injectable 5000 Unit(s) SubCutaneous every 12 hours  levothyroxine 137 MICROGram(s) Oral daily  melatonin 5 milliGRAM(s) Oral at bedtime  metoprolol tartrate 25 milliGRAM(s) Oral two times a day  QUEtiapine 25 milliGRAM(s) Oral at bedtime  saccharomyces boulardii 250 milliGRAM(s) Oral two times a day  tamsulosin 0.4 milliGRAM(s) Oral at bedtime    MEDICATIONS  (PRN):  acetaminophen   Tablet .. 975 milliGRAM(s) Oral every 8 hours PRN Mild Pain (1 - 3)  ALBUTerol    90 MICROgram(s) HFA Inhaler 2 Puff(s) Inhalation every 6 hours PRN Shortness of Breath  guaiFENesin   Syrup  (Sugar-Free) 100 milliGRAM(s) Oral every 6 hours PRN Cough      ASSESSMENT / PLAN:  ----------------------------------------  85 year old male from [facility] with hx of anxiety, bipolar, depression, hypothyroidism, asthma/copd, idiopathic thrombocytopenia who presents c/o dysuria, UA positive and noted with UTI. Also noted with LLQ abd pain and ct abd/pelvis showing evidence of mild acute diverticulitis, also noted with possible prostate abscess requiring surgical drainage.    Prostate abscess - Status post cystoscopy and unroofing of prostate abscess. No significant hematuria noted, continuous bladder irrigation discontinued. To monitor urine output. On ceftriaxone. Discussed with Infectious Disease for long term intravenous antibiotics via midline.    Acute diverticulitis - Mild diverticulitis of the proximal sigmoid colon without abscess. Treated with ceftriaxone and metronidazole. Denied any gastrointestinal complaints. Afebrile and without leukocytosis. Blood cultures were without growth.    Urine retention - On tamsulosin. Continuous bladder irrigation in progress.    Hypothyroidism - On levothyroxine.    Hyponatremia - Repeat laboratory studies ordered to monitor.    Chronic kidney disease III - Renal function stable.    COPD - No dyspnea or hypoxia on examination. Inhaled bronchodilator therapy as needed.    Bipolar disorder - On bupropion, escitalopram, and quetiapine. Psychiatry consultation noted, no further intervention recommended.

## 2020-10-29 NOTE — PROCEDURE NOTE - NSPROCDETAILS_GEN_ALL_CORE
sterile technique, indwelling urinary device inserted
sterile technique, indwelling urinary device inserted
sterile dressing applied/sterile technique, catheter placed/ultrasound guidance/location identified, draped/prepped, sterile technique used/supine position/ultrasound assessment

## 2020-10-29 NOTE — DIETITIAN INITIAL EVALUATION ADULT. - OTHER INFO
89yo male presents c/o dysuria, UA positive and noted with UTI. Also noted with LLQ abd pain and ct abd/pelvis showing evidence of mild acute diverticulitis, now s/p cystoscopy and unroofing of prostate abscess (10/27), hx COPD, dementia. Aware PT AA+O x2, reports decreased appetite/PO intake due to not wanting to go to the bathroom 2/2 dysuria. PT reports UBW 211lbs, current weight 212.3lbs. Noted box of cookies and bin of trail mix at bedside. Per documentation, Pt completing 100% of meals.

## 2020-10-29 NOTE — PROGRESS NOTE ADULT - SUBJECTIVE AND OBJECTIVE BOX
[facility]  INFECTIOUS DISEASES AND INTERNAL MEDICINE at [place]  =======================================================  Emile Guthrie MD  Diplomates American Board of Internal Medicine and Infectious Diseases  Tel: [phone]      Fax: [phone]  =======================================================    NAOMI BAXTER [id number]    Follow up: UTI    No fever or chills  No flank pain  No complaints       Allergies:  penicillin (Rash; Hives)      REVIEW OF SYSTEMS:  CONSTITUTIONAL:  No Fever or chills  HEENT:  No diplopia or blurred vision.  No earache, sore throat or runny nose.  CARDIOVASCULAR:  No pressure, squeezing, strangling, tightness, heaviness or aching about the chest, neck, axilla or epigastrium.  RESPIRATORY:  No cough, shortness of breath  GASTROINTESTINAL:  No nausea No vomiting or diarrhea.  GENITOURINARY:  no flank pain  MUSCULOSKELETAL:  no joint aches, no muscle pain  SKIN:  No change in skin, hair or nails.  NEUROLOGIC:  No Headaches, seizures  PSYCHIATRIC:  No disorder of thought or mood.  ENDOCRINE:  No heat or cold intolerance  HEMATOLOGICAL:  No easy bruising or bleeding.       Physical Exam:  GEN: NAD, pleasant  HEENT: normocephalic and atraumatic. EOMI. PERRL.  Anicteric  NECK: Supple.   LUNGS: Clear to auscultation.  HEART: Regular rate and rhythm   ABDOMEN: Soft, nontender, and nondistended.  Positive bowel sounds.    : No flank pain  EXTREMITIES: Without any edema.  MSK: No joint swelling  NEUROLOGIC: No Focal Deficits  PSYCHIATRIC: Appropriate affect .  SKIN: No Rash      Vitals:    T(F): 98 (28 Oct 2020 23:06), Max: 98 (28 Oct 2020 23:06)  HR: 77 (29 Oct 2020 05:36)  BP: 105/62 (29 Oct 2020 05:36)  RR: 18 (28 Oct 2020 23:06)  SpO2: 95% (28 Oct 2020 23:48) (92% - 96%)  temp max in last 48H T(F): , Max: 98.8 (10-27-20 @ 18:54)      Current Antibiotics:  cefTRIAXone   IVPB 1000 milliGRAM(s) IV Intermittent every 24 hours    Other medications:  buPROPion XL . 150 milliGRAM(s) Oral daily  cyanocobalamin 1000 MICROGram(s) Oral daily  escitalopram 10 milliGRAM(s) Oral daily  levothyroxine 137 MICROGram(s) Oral daily  melatonin 5 milliGRAM(s) Oral at bedtime  metoprolol tartrate 25 milliGRAM(s) Oral two times a day  QUEtiapine 25 milliGRAM(s) Oral at bedtime  saccharomyces boulardii 250 milliGRAM(s) Oral two times a day  tamsulosin 0.4 milliGRAM(s) Oral at bedtime        Labs:                        10.0   7.45  )-----------( 177      ( 28 Oct 2020 08:08 )             31.6      10-28    131<L>  |  98  |  15.0  ----------------------------<  140<H>  5.0   |  23.0  |  1.30    Ca    8.5<L>      28 Oct 2020 08:08        Culture - Urine (collected 10-22-20 @ 21:53)  Source: .Urine Clean Catch (Midstream)  Final Report (10-24-20 @ 15:10):    >=3 organisms. Probable collection contamination.    Culture - Blood (collected 10-22-20 @ 12:44)  Source: .Blood Blood-Venous  Final Report (10-27-20 @ 13:01):    No growth at 5 days.    Culture - Blood (collected 10-22-20 @ 12:44)  Source: .Blood Blood-Venous  Final Report (10-27-20 @ 13:01):    No growth at 5 days.      WBC Count: 7.45 K/uL (10-28-20 @ 08:08)  WBC Count: 10.33 K/uL (10-25-20 @ 07:39)  WBC Count: 12.71 K/uL (10-24-20 @ 08:53)    Creatinine, Serum: 1.30 mg/dL (10-28-20 @ 08:08)  Creatinine, Serum: 1.27 mg/dL (10-27-20 @ 08:21)  Creatinine, Serum: 1.29 mg/dL (10-24-20 @ 08:53)    Procalcitonin, Serum: 0.38 ng/mL (10-26-20 @ 07:54)    COVID-19 PCR: NotDetec (10-26-20 @ 12:36)  COVID-19 IgG Antibody Index: <0.10 Index (10-23-20 @ 04:24)  COVID-19 IgG Antibody Interpretation: Negative (10-23-20 @ 04:24)  COVID-19 PCR: NotDetec (10-22-20 @ 13:58)  COVID-19 PCR: NotDetec (10-22-20 @ 11:56)  COVID-19 IgG Antibody Interpretation: Negative (10-09-20 @ 15:19)  COVID-19 IgG Antibody Index: <0.10 Index (10-09-20 @ 15:19)  COVID-19 PCR: NotDetec (10-09-20 @ 01:23)        < from: CT Abdomen and Pelvis w/ Oral Cont and w/ IV Cont (10.26.20 @ 18:05) >  EXAM:  CT ABDOMEN AND PELVIS OC IC                          PROCEDURE DATE:  10/26/2020      INTERPRETATION:  CLINICAL INFORMATION: Possible pelvic abscess. Abnormal appearance of the prostate on noncontrast study. Diverticulosis.    COMPARISON: 10/22/2020    PROCEDURE:  CT of the Abdomen and Pelvis was performed with intravenous contrast.  Intravenous contrast: 90 ml Omnipaque 350. 10 ml discarded.  Oral contrast: positive contrast was administered.  Sagittal and coronal reformats were performed.    FINDINGS:  LOWER CHEST: Evidence of previous descending thoracic aneurysm repair with stent placement. Minimal pleural effusions. Small cyst or pneumatocele at the left lung base. Mild linear scarring at the lung bases  LIVER: Within normal limits.  BILE DUCTS: Normal caliber.  GALLBLADDER: Cholecystectomy.  SPLEEN: Within normal limits.  PANCREAS: Within normal limits.  ADRENALS: Within normal limits.  KIDNEYS/URETERS: Large exophytic upper pole right renal cyst. Additional smaller right upper to midpole cortical cyst    BLADDER: Soto catheter.  REPRODUCTIVE ORGANS: The prostate gland is heterogeneous in attenuation with low attenuation foci in the left side of the prostate land. In the appropriate clinical scenario this may represent prostatic abscess. Please correlate for any recent instrumentation. This area the prostate measures 3.7 cm in length by 3.1 cm transverse by 2.4 cm AP    BOWEL: No bowel obstruction. Appendix is normal.  PERITONEUM: No ascites.  VESSELS: Atherosclerotic changes.  RETROPERITONEUM/LYMPH NODES: No lymphadenopathy.  ABDOMINAL WALL: Within normal limits.  BONES: Degenerative changes with multilevel vacuum phenomenon and spondylosis and facet hypertrophy.    IMPRESSION: Low attenuation foci withinthe left posterior aspect of the prostate gland with enlargement of this part of the prostate gland suggestive of intraprostatic abscess.  Diverticulosis without diverticulitis  Right renal cysts  Status post aortic thoracic aortic aneurysm repair  Cholecystectomy    < end of copied text >

## 2020-10-29 NOTE — PROGRESS NOTE ADULT - SUBJECTIVE AND OBJECTIVE BOX
Subjective:88yMale with prostate abscess, urinary retention, POD#2 s/p cysto, unroofing of prostatic abscess. Pt resting comfortably, eating breakfast.  Pt appears better today and in better spirits.  CBI held yesterday, urine remained nonhematuric,.    Soto: yellow with small amount of sediment    Vital Signs Last 24 Hrs  T(C): 36.4 (29 Oct 2020 08:56), Max: 36.7 (28 Oct 2020 23:06)  T(F): 97.5 (29 Oct 2020 08:56), Max: 98 (28 Oct 2020 23:06)  HR: 69 (29 Oct 2020 08:56) (69 - 78)  BP: 111/66 (29 Oct 2020 08:56) (104/60 - 111/66)  BP(mean): --  RR: 18 (29 Oct 2020 08:56) (18 - 18)  SpO2: 95% (29 Oct 2020 08:56) (92% - 95%)  I&O's Detail    28 Oct 2020 07:01  -  29 Oct 2020 07:00  --------------------------------------------------------  IN:  Total IN: 0 mL    OUT:    Continuous Bladder Irrigation (mL): 800 mL    Indwelling Catheter - Urethral (mL): 2900 mL  Total OUT: 3700 mL    Total NET: -3700 mL          Labs:                        10.0   7.45  )-----------( 177      ( 28 Oct 2020 08:08 )             31.6     10-28    131<L>  |  98  |  15.0  ----------------------------<  140<H>  5.0   |  23.0  |  1.30    Ca    8.5<L>      28 Oct 2020 08:08

## 2020-10-29 NOTE — PROGRESS NOTE ADULT - ATTENDING COMMENTS
recommend repeat CT scan of pelvis tomorrow.     Continue IV antibiotics per ID
Anticipate discharge in 24-48 hours
Urine clear yellow in tubing.     Will discontinue lester in AM    Push fluids.
prostate abscess noted on Ct.  Patient needs unroofing.  explained to patient and step son

## 2020-10-29 NOTE — PROCEDURE NOTE - NSPOSTCAREGUIDE_GEN_A_CORE
Verbal/written post procedure instructions were given to patient/caregiver
Instructed patient/caregiver regarding signs and symptoms of infection/Care for catheter as per unit/ICU protocols/Keep the cast/splint/dressing clean and dry/Verbal/written post procedure instructions were given to patient/caregiver/Instructed patient/caregiver to follow-up with primary care physician

## 2020-10-29 NOTE — PROCEDURE NOTE - NSINDICATIONS_GEN_A_CORE
bladder distention/urinry obstruction or retention
post-void residual/urinry obstruction or retention/bladder distention/history of difficult urethral catheterization
antibiotic therapy

## 2020-10-30 ENCOUNTER — TRANSCRIPTION ENCOUNTER (OUTPATIENT)
Age: 85
End: 2020-10-30

## 2020-10-30 VITALS
OXYGEN SATURATION: 96 % | HEART RATE: 86 BPM | SYSTOLIC BLOOD PRESSURE: 136 MMHG | DIASTOLIC BLOOD PRESSURE: 78 MMHG | TEMPERATURE: 98 F | RESPIRATION RATE: 18 BRPM

## 2020-10-30 PROCEDURE — 82803 BLOOD GASES ANY COMBINATION: CPT

## 2020-10-30 PROCEDURE — 85025 COMPLETE CBC W/AUTO DIFF WBC: CPT

## 2020-10-30 PROCEDURE — 84145 PROCALCITONIN (PCT): CPT

## 2020-10-30 PROCEDURE — 83605 ASSAY OF LACTIC ACID: CPT

## 2020-10-30 PROCEDURE — 82330 ASSAY OF CALCIUM: CPT

## 2020-10-30 PROCEDURE — 80048 BASIC METABOLIC PNL TOTAL CA: CPT

## 2020-10-30 PROCEDURE — 82947 ASSAY GLUCOSE BLOOD QUANT: CPT

## 2020-10-30 PROCEDURE — 74177 CT ABD & PELVIS W/CONTRAST: CPT

## 2020-10-30 PROCEDURE — 97530 THERAPEUTIC ACTIVITIES: CPT

## 2020-10-30 PROCEDURE — 87040 BLOOD CULTURE FOR BACTERIA: CPT

## 2020-10-30 PROCEDURE — 97163 PT EVAL HIGH COMPLEX 45 MIN: CPT

## 2020-10-30 PROCEDURE — 86769 SARS-COV-2 COVID-19 ANTIBODY: CPT

## 2020-10-30 PROCEDURE — 85018 HEMOGLOBIN: CPT

## 2020-10-30 PROCEDURE — 94640 AIRWAY INHALATION TREATMENT: CPT

## 2020-10-30 PROCEDURE — 99232 SBSQ HOSP IP/OBS MODERATE 35: CPT

## 2020-10-30 PROCEDURE — 71046 X-RAY EXAM CHEST 2 VIEWS: CPT

## 2020-10-30 PROCEDURE — 83735 ASSAY OF MAGNESIUM: CPT

## 2020-10-30 PROCEDURE — 76857 US EXAM PELVIC LIMITED: CPT

## 2020-10-30 PROCEDURE — 82962 GLUCOSE BLOOD TEST: CPT

## 2020-10-30 PROCEDURE — 36415 COLL VENOUS BLD VENIPUNCTURE: CPT

## 2020-10-30 PROCEDURE — 85014 HEMATOCRIT: CPT

## 2020-10-30 PROCEDURE — 97116 GAIT TRAINING THERAPY: CPT

## 2020-10-30 PROCEDURE — U0003: CPT

## 2020-10-30 PROCEDURE — 87086 URINE CULTURE/COLONY COUNT: CPT

## 2020-10-30 PROCEDURE — 84132 ASSAY OF SERUM POTASSIUM: CPT

## 2020-10-30 PROCEDURE — 84153 ASSAY OF PSA TOTAL: CPT

## 2020-10-30 PROCEDURE — 99239 HOSP IP/OBS DSCHRG MGMT >30: CPT

## 2020-10-30 PROCEDURE — 74176 CT ABD & PELVIS W/O CONTRAST: CPT

## 2020-10-30 PROCEDURE — 85027 COMPLETE CBC AUTOMATED: CPT

## 2020-10-30 PROCEDURE — 99285 EMERGENCY DEPT VISIT HI MDM: CPT

## 2020-10-30 PROCEDURE — 99497 ADVNCD CARE PLAN 30 MIN: CPT

## 2020-10-30 PROCEDURE — 81001 URINALYSIS AUTO W/SCOPE: CPT

## 2020-10-30 PROCEDURE — 84295 ASSAY OF SERUM SODIUM: CPT

## 2020-10-30 PROCEDURE — 82435 ASSAY OF BLOOD CHLORIDE: CPT

## 2020-10-30 PROCEDURE — 84154 ASSAY OF PSA FREE: CPT

## 2020-10-30 PROCEDURE — 88305 TISSUE EXAM BY PATHOLOGIST: CPT

## 2020-10-30 PROCEDURE — 80053 COMPREHEN METABOLIC PANEL: CPT

## 2020-10-30 RX ORDER — BUPROPION HYDROCHLORIDE 150 MG/1
1 TABLET, EXTENDED RELEASE ORAL
Qty: 0 | Refills: 0 | DISCHARGE

## 2020-10-30 RX ORDER — METOPROLOL TARTRATE 50 MG
1 TABLET ORAL
Qty: 0 | Refills: 0 | DISCHARGE

## 2020-10-30 RX ORDER — SACCHAROMYCES BOULARDII 250 MG
1 POWDER IN PACKET (EA) ORAL
Qty: 0 | Refills: 0 | DISCHARGE
Start: 2020-10-30

## 2020-10-30 RX ORDER — LANOLIN ALCOHOL/MO/W.PET/CERES
1 CREAM (GRAM) TOPICAL
Qty: 0 | Refills: 0 | DISCHARGE

## 2020-10-30 RX ORDER — ESCITALOPRAM OXALATE 10 MG/1
1 TABLET, FILM COATED ORAL
Qty: 0 | Refills: 0 | DISCHARGE

## 2020-10-30 RX ORDER — ALBUTEROL 90 UG/1
1 AEROSOL, METERED ORAL
Qty: 0 | Refills: 0 | DISCHARGE

## 2020-10-30 RX ORDER — LEVOTHYROXINE SODIUM 125 MCG
1 TABLET ORAL
Qty: 0 | Refills: 0 | DISCHARGE

## 2020-10-30 RX ORDER — TAMSULOSIN HYDROCHLORIDE 0.4 MG/1
1 CAPSULE ORAL
Qty: 0 | Refills: 0 | DISCHARGE
Start: 2020-10-30

## 2020-10-30 RX ORDER — CEFTRIAXONE 500 MG/1
1 INJECTION, POWDER, FOR SOLUTION INTRAMUSCULAR; INTRAVENOUS
Qty: 0 | Refills: 0 | DISCHARGE
Start: 2020-10-30

## 2020-10-30 RX ADMIN — Medication 250 MILLIGRAM(S): at 05:10

## 2020-10-30 RX ADMIN — Medication 25 MILLIGRAM(S): at 05:11

## 2020-10-30 RX ADMIN — PREGABALIN 1000 MICROGRAM(S): 225 CAPSULE ORAL at 12:15

## 2020-10-30 RX ADMIN — SODIUM CHLORIDE 1 GRAM(S): 9 INJECTION INTRAMUSCULAR; INTRAVENOUS; SUBCUTANEOUS at 05:10

## 2020-10-30 RX ADMIN — SODIUM CHLORIDE 1 GRAM(S): 9 INJECTION INTRAMUSCULAR; INTRAVENOUS; SUBCUTANEOUS at 12:14

## 2020-10-30 RX ADMIN — ESCITALOPRAM OXALATE 10 MILLIGRAM(S): 10 TABLET, FILM COATED ORAL at 12:15

## 2020-10-30 RX ADMIN — HEPARIN SODIUM 5000 UNIT(S): 5000 INJECTION INTRAVENOUS; SUBCUTANEOUS at 18:51

## 2020-10-30 RX ADMIN — CEFTRIAXONE 100 MILLIGRAM(S): 500 INJECTION, POWDER, FOR SOLUTION INTRAMUSCULAR; INTRAVENOUS at 12:15

## 2020-10-30 RX ADMIN — BUPROPION HYDROCHLORIDE 150 MILLIGRAM(S): 150 TABLET, EXTENDED RELEASE ORAL at 12:14

## 2020-10-30 RX ADMIN — HEPARIN SODIUM 5000 UNIT(S): 5000 INJECTION INTRAVENOUS; SUBCUTANEOUS at 05:11

## 2020-10-30 RX ADMIN — Medication 137 MICROGRAM(S): at 05:11

## 2020-10-30 NOTE — GOALS OF CARE CONVERSATION - ADVANCED CARE PLANNING - CONVERSATION DETAILS
Discussed with patient MOLST form and HCP. Patient states he wants to make wife, Michele Fleming his HCP and son Darwin Dent the alternate agent. He feels at this time he would want CPR and Intubation if he were to stop breathing or his heart were to stop. He trusts that his wife would make appropriate decisions if he was unable to do so. He will defer filling out MOLST at this time. Discussed with patient MOLST form and HCP. Patient states he wants to make wife, Michele Fleming his HCP and son Darwin Dent the alternate agent. He feels at this time he would want CPR and Intubation if he were to stop breathing or his heart were to stop. He trusts that his wife would make appropriate decisions if he was unable to do so. He will defer filling out MOLST at this time.    30 minutes total time

## 2020-10-30 NOTE — PROGRESS NOTE ADULT - REASON FOR ADMISSION
Burning on urination

## 2020-10-30 NOTE — DISCHARGE NOTE PROVIDER - NSDCMRMEDTOKEN_GEN_ALL_CORE_FT
albuterol 90 mcg/inh inhalation aerosol with adapter: 1 puff(s) inhaled 2 times a day  aspirin 81 mg oral delayed release tablet: 1 tab(s) orally once a day  buPROPion 150 mg/24 hours (XL) oral tablet, extended release: 1 tab(s) orally every 24 hours  cefTRIAXone 1 g intravenous injection: 1 gram(s) intravenous every 24 hours through 11/4/20  cyanocobalamin 1000 mcg oral tablet: 1 tab(s) orally once a day  ergocalciferol 50,000 intl units (1.25 mg) oral capsule: 1 cap(s) orally once a week  escitalopram 10 mg oral tablet: 1 tab(s) orally once a day  Melatonin 10 mg oral capsule: 1 cap(s) orally once a day (at bedtime)  metoprolol tartrate 25 mg oral tablet: 1 tab(s) orally 2 times a day  QUEtiapine 25 mg oral tablet: 1 tab(s) orally 2 times a day  saccharomyces boulardii lyo 250 mg oral capsule: 1 cap(s) orally 2 times a day throught 11/4/20  Synthroid 137 mcg (0.137 mg) oral tablet: 1 tab(s) orally once a day  tamsulosin 0.4 mg oral capsule: 1 cap(s) orally once a day (at bedtime)

## 2020-10-30 NOTE — PROGRESS NOTE ADULT - SUBJECTIVE AND OBJECTIVE BOX
[facility]  INFECTIOUS DISEASES AND INTERNAL MEDICINE at [place]  =======================================================  Emile Guthrie MD  Diplomates American Board of Internal Medicine and Infectious Diseases  Tel: [phone]      Fax: [phone]  =======================================================    NAOMI BAXTER [id number]    Follow up: UTI    No fever or chills  No flank pain  No complaints       Allergies:  penicillin (Rash; Hives)      REVIEW OF SYSTEMS:  CONSTITUTIONAL:  No Fever or chills  HEENT:  No diplopia or blurred vision.  No earache, sore throat or runny nose.  CARDIOVASCULAR:  No pressure, squeezing, strangling, tightness, heaviness or aching about the chest, neck, axilla or epigastrium.  RESPIRATORY:  No cough, shortness of breath  GASTROINTESTINAL:  No nausea No vomiting or diarrhea.  GENITOURINARY:  no flank pain  MUSCULOSKELETAL:  no joint aches, no muscle pain  SKIN:  No change in skin, hair or nails.  NEUROLOGIC:  No Headaches, seizures  PSYCHIATRIC:  No disorder of thought or mood.  ENDOCRINE:  No heat or cold intolerance  HEMATOLOGICAL:  No easy bruising or bleeding.       Physical Exam:  GEN: NAD, pleasant  HEENT: normocephalic and atraumatic. EOMI. PERRL.  Anicteric  NECK: Supple.   LUNGS: Clear to auscultation.  HEART: Regular rate and rhythm   ABDOMEN: Soft, nontender, and nondistended.  Positive bowel sounds.    : No flank pain  EXTREMITIES: Without any edema.  MSK: No joint swelling  NEUROLOGIC: No Focal Deficits  PSYCHIATRIC: Appropriate affect .  SKIN: No Rash      Vitals:  T(F): 98 (28 Oct 2020 23:06), Max: 98 (28 Oct 2020 23:06)  HR: 77 (29 Oct 2020 05:36)  BP: 105/62 (29 Oct 2020 05:36)  RR: 18 (28 Oct 2020 23:06)  SpO2: 95% (28 Oct 2020 23:48) (92% - 96%)  temp max in last 48H T(F): , Max: 98.8 (10-27-20 @ 18:54)      Current Antibiotics:  cefTRIAXone   IVPB 1000 milliGRAM(s) IV Intermittent every 24 hours      Other medications:  buPROPion XL . 150 milliGRAM(s) Oral daily  cyanocobalamin 1000 MICROGram(s) Oral daily  escitalopram 10 milliGRAM(s) Oral daily  levothyroxine 137 MICROGram(s) Oral daily  melatonin 5 milliGRAM(s) Oral at bedtime  metoprolol tartrate 25 milliGRAM(s) Oral two times a day  QUEtiapine 25 milliGRAM(s) Oral at bedtime  saccharomyces boulardii 250 milliGRAM(s) Oral two times a day  tamsulosin 0.4 milliGRAM(s) Oral at bedtime      Labs:             10.0   7.45  )-----------( 177      ( 28 Oct 2020 08:08 )             31.6      10-28    131<L>  |  98  |  15.0  ----------------------------<  140<H>  5.0   |  23.0  |  1.30    Ca    8.5<L>      28 Oct 2020 08:08      Culture - Urine (collected 10-22-20 @ 21:53)  Source: .Urine Clean Catch (Midstream)  Final Report (10-24-20 @ 15:10):    >=3 organisms. Probable collection contamination.    Culture - Blood (collected 10-22-20 @ 12:44)  Source: .Blood Blood-Venous  Final Report (10-27-20 @ 13:01):    No growth at 5 days.    Culture - Blood (collected 10-22-20 @ 12:44)  Source: .Blood Blood-Venous  Final Report (10-27-20 @ 13:01):    No growth at 5 days.      WBC Count: 7.45 K/uL (10-28-20 @ 08:08)  WBC Count: 10.33 K/uL (10-25-20 @ 07:39)  WBC Count: 12.71 K/uL (10-24-20 @ 08:53)    Creatinine, Serum: 1.30 mg/dL (10-28-20 @ 08:08)  Creatinine, Serum: 1.27 mg/dL (10-27-20 @ 08:21)  Creatinine, Serum: 1.29 mg/dL (10-24-20 @ 08:53)    Procalcitonin, Serum: 0.38 ng/mL (10-26-20 @ 07:54)    COVID-19 PCR: NotDetec (10-26-20 @ 12:36)  COVID-19 IgG Antibody Index: <0.10 Index (10-23-20 @ 04:24)  COVID-19 IgG Antibody Interpretation: Negative (10-23-20 @ 04:24)  COVID-19 PCR: NotDetec (10-22-20 @ 13:58)  COVID-19 PCR: NotDetec (10-22-20 @ 11:56)  COVID-19 IgG Antibody Interpretation: Negative (10-09-20 @ 15:19)  COVID-19 IgG Antibody Index: <0.10 Index (10-09-20 @ 15:19)  COVID-19 PCR: NotDetec (10-09-20 @ 01:23)        < from: CT Abdomen and Pelvis w/ Oral Cont and w/ IV Cont (10.26.20 @ 18:05) >  EXAM:  CT ABDOMEN AND PELVIS OC IC                          PROCEDURE DATE:  10/26/2020      INTERPRETATION:  CLINICAL INFORMATION: Possible pelvic abscess. Abnormal appearance of the prostate on noncontrast study. Diverticulosis.    COMPARISON: 10/22/2020    PROCEDURE:  CT of the Abdomen and Pelvis was performed with intravenous contrast.  Intravenous contrast: 90 ml Omnipaque 350. 10 ml discarded.  Oral contrast: positive contrast was administered.  Sagittal and coronal reformats were performed.    FINDINGS:  LOWER CHEST: Evidence of previous descending thoracic aneurysm repair with stent placement. Minimal pleural effusions. Small cyst or pneumatocele at the left lung base. Mild linear scarring at the lung bases  LIVER: Within normal limits.  BILE DUCTS: Normal caliber.  GALLBLADDER: Cholecystectomy.  SPLEEN: Within normal limits.  PANCREAS: Within normal limits.  ADRENALS: Within normal limits.  KIDNEYS/URETERS: Large exophytic upper pole right renal cyst. Additional smaller right upper to midpole cortical cyst    BLADDER: Soto catheter.  REPRODUCTIVE ORGANS: The prostate gland is heterogeneous in attenuation with low attenuation foci in the left side of the prostate land. In the appropriate clinical scenario this may represent prostatic abscess. Please correlate for any recent instrumentation. This area the prostate measures 3.7 cm in length by 3.1 cm transverse by 2.4 cm AP    BOWEL: No bowel obstruction. Appendix is normal.  PERITONEUM: No ascites.  VESSELS: Atherosclerotic changes.  RETROPERITONEUM/LYMPH NODES: No lymphadenopathy.  ABDOMINAL WALL: Within normal limits.  BONES: Degenerative changes with multilevel vacuum phenomenon and spondylosis and facet hypertrophy.    IMPRESSION: Low attenuation foci within the left posterior aspect of the prostate gland with enlargement of this part of the prostate gland suggestive of intraprostatic abscess.  Diverticulosis without diverticulitis  Right renal cysts  Status post aortic thoracic aortic aneurysm repair  Cholecystectomy    < end of copied text >

## 2020-10-30 NOTE — DISCHARGE NOTE PROVIDER - HOSPITAL COURSE
88M presented from the assisted living facility with dysuria. The patient also reported weakness and decreased oral intake. On presentation, WBC(28.31), H/H(11/34.2), BUN/Cr(21/1.39), Lactate(4.2). urinalysis was noted to be abnormal and indicative of a urinary tact infection for which antibiotics were initiated. CT of the abdomen noted no hydronephrosis, or perinephric fat stranding, noted an enlarged prostate gland with an asymmetric bulge in the left prostatic contour with a low density at the site, extensive colonic diverticulosis with diverticulitis of the proximal sigmoid colon. The patient was noted to have urine retention and required consultation by Urology for insertion of a urinary catheter. The CT was reviewed by Urology and thought not to represent a prostate abscess with recommendations for repeat imaging to confirm. The patient was seen by Infectious Disease and continued on antibiotics. He was also seen by Psychiatry in consultation at the request of the assisted living facility with recommendations to continue on his current regimen of medications, no further intervention was recommended prior to returning to the assisted living facility upon discharge. Blood cultures were without growth. Repeat studies noted improvement in the lactate level and leukocytosis. CT of the abdomen with contrast noted a low attenuation foci within the left posterior aspect of the prostate gland with enlargement of this part of the prostate gland suggestive of intraprostatic abscess, diverticulosis without diverticulitis. The patient underwent surgery with transurethral drainage of the left posterolateral prostate abscess. A midline was placed for long-term antibiotic therapy. There was no evidence of hematuria and continuous bladder irrigation was discontinued. The patient was seen by Urology and the Soto catheter was removed.      35 minutes total time

## 2020-10-30 NOTE — PROGRESS NOTE ADULT - SUBJECTIVE AND OBJECTIVE BOX
INTERVAL HPI/OVERNIGHT EVENTS/SUBJECTIVE:  Pt seen and examined. CBI remained off overnight, no clots.  I removed the lester at 0800. Patient tolerated well. Offers no complaints. tolerating diet. Urine in lester bag is di colored. Remains afebrile.   Denies cp, sob, n/v/d, abd pain.     ICU Vital Signs Last 24 Hrs  T(C): 36.8 (30 Oct 2020 05:03), Max: 36.8 (29 Oct 2020 15:45)  T(F): 98.2 (30 Oct 2020 05:03), Max: 98.2 (29 Oct 2020 15:45)  HR: 80 (30 Oct 2020 05:03) (69 - 81)  BP: 116/67 (30 Oct 2020 05:03) (111/66 - 124/68)  BP(mean): --  ABP: --  ABP(mean): --  RR: 19 (30 Oct 2020 05:03) (18 - 19)  SpO2: 96% (30 Oct 2020 05:03) (94% - 97%)      I&O's Detail    29 Oct 2020 07:01  -  30 Oct 2020 07:00  --------------------------------------------------------  IN:  Total IN: 0 mL    OUT:    Indwelling Catheter - Urethral (mL): 900 mL  Total OUT: 900 mL    Total NET: -900 mL      MEDICATIONS  (STANDING):  buPROPion XL . 150 milliGRAM(s) Oral daily  cefTRIAXone   IVPB 1000 milliGRAM(s) IV Intermittent every 24 hours  cyanocobalamin 1000 MICROGram(s) Oral daily  escitalopram 10 milliGRAM(s) Oral daily  heparin   Injectable 5000 Unit(s) SubCutaneous every 12 hours  levothyroxine 137 MICROGram(s) Oral daily  melatonin 5 milliGRAM(s) Oral at bedtime  metoprolol tartrate 25 milliGRAM(s) Oral two times a day  QUEtiapine 25 milliGRAM(s) Oral at bedtime  saccharomyces boulardii 250 milliGRAM(s) Oral two times a day  sodium chloride 1 Gram(s) Oral three times a day  tamsulosin 0.4 milliGRAM(s) Oral at bedtime    MEDICATIONS  (PRN):  acetaminophen   Tablet .. 975 milliGRAM(s) Oral every 8 hours PRN Mild Pain (1 - 3)  ALBUTerol    90 MICROgram(s) HFA Inhaler 2 Puff(s) Inhalation every 6 hours PRN Shortness of Breath  guaiFENesin   Syrup  (Sugar-Free) 100 milliGRAM(s) Oral every 6 hours PRN Cough      MISC:     PHYSICAL EXAM:    Gen: NAD, laying comfortably  Neurological: AAOx3  Pulmonary: non-labored, no accessory muscle use  Cardiovascular: s1/s2  Gastrointestinal: soft, NTND  Genitourinary: lester discontinued , urine color in lester bag di colored       LABS:  CBC Full  -  ( 28 Oct 2020 08:08 )  WBC Count : 7.45 K/uL  RBC Count : 3.40 M/uL  Hemoglobin : 10.0 g/dL  Hematocrit : 31.6 %  Platelet Count - Automated : 177 K/uL  Mean Cell Volume : 92.9 fl  Mean Cell Hemoglobin : 29.4 pg  Mean Cell Hemoglobin Concentration : 31.6 gm/dL  Auto Neutrophil # : x  Auto Lymphocyte # : x  Auto Monocyte # : x  Auto Eosinophil # : x  Auto Basophil # : x  Auto Neutrophil % : x  Auto Lymphocyte % : x  Auto Monocyte % : x  Auto Eosinophil % : x  Auto Basophil % : x    10-28    131<L>  |  98  |  15.0  ----------------------------<  140<H>  5.0   |  23.0  |  1.30    Ca    8.5<L>      28 Oct 2020 08:08    ASSESSMENT/PLAN:  88yMale presenting with: prostatic abscess, urinary retention, POD#3 s/p cysto, unroofing of prostatic abscess  - lester discontinued, awaiting TOV  - cont abx   - encourage oral hydration  -rest of care per primary team

## 2020-10-30 NOTE — DISCHARGE NOTE PROVIDER - NSDCCPCAREPLAN_GEN_ALL_CORE_FT
PRINCIPAL DISCHARGE DIAGNOSIS  Diagnosis: Prostate abscess  Assessment and Plan of Treatment: Complete the course of antibiotics as prescribed. Follow up with Urology.      SECONDARY DISCHARGE DIAGNOSES  Diagnosis: Hypothyroidism  Assessment and Plan of Treatment: Continue on levothyroxine.    Diagnosis: Urine retention  Assessment and Plan of Treatment: Continue on tamsulosin.

## 2020-10-30 NOTE — PROGRESS NOTE ADULT - SUBJECTIVE AND OBJECTIVE BOX
NAOMI BAXTER  ----------------------------------------  The patient was seen and evaluated for prostate abscess. Offers no complaints.    Vital Signs Last 24 Hrs  T(C): 36.8 (30 Oct 2020 05:03), Max: 36.8 (29 Oct 2020 15:45)  T(F): 98.2 (30 Oct 2020 05:03), Max: 98.2 (29 Oct 2020 15:45)  HR: 80 (30 Oct 2020 05:03) (75 - 81)  BP: 116/67 (30 Oct 2020 05:03) (116/67 - 124/68)  BP(mean): --  RR: 19 (30 Oct 2020 05:03) (18 - 19)  SpO2: 96% (30 Oct 2020 05:03) (94% - 96%)    PHYSICAL EXAMINATION:  ----------------------------------------  General appearance: No acute distress, Awake, Alert  HEENT: Normocephalic, Atraumatic, Conjunctiva clear, EOMI  Neck: Supple, No JVD, No tenderness  Lungs: Breath sound equal bilaterally, No wheezes, No rales  Cardiovascular: S1S2, Regular rhythm  Abdomen: Soft, Nontender, Nondistended, No guarding/rebound, Positive bowel sounds  Extremities: No clubbing, No cyanosis, No edema, No calf tenderness  Neuro: Strength equal bilaterally, No tremors  Psychiatric: Appropriate mood, Normal affect    MEDICATIONS  (STANDING):  buPROPion XL . 150 milliGRAM(s) Oral daily  cefTRIAXone   IVPB 1000 milliGRAM(s) IV Intermittent every 24 hours  cyanocobalamin 1000 MICROGram(s) Oral daily  escitalopram 10 milliGRAM(s) Oral daily  heparin   Injectable 5000 Unit(s) SubCutaneous every 12 hours  levothyroxine 137 MICROGram(s) Oral daily  melatonin 5 milliGRAM(s) Oral at bedtime  metoprolol tartrate 25 milliGRAM(s) Oral two times a day  QUEtiapine 25 milliGRAM(s) Oral at bedtime  saccharomyces boulardii 250 milliGRAM(s) Oral two times a day  sodium chloride 1 Gram(s) Oral three times a day  tamsulosin 0.4 milliGRAM(s) Oral at bedtime    MEDICATIONS  (PRN):  acetaminophen   Tablet .. 975 milliGRAM(s) Oral every 8 hours PRN Mild Pain (1 - 3)  ALBUTerol    90 MICROgram(s) HFA Inhaler 2 Puff(s) Inhalation every 6 hours PRN Shortness of Breath  guaiFENesin   Syrup  (Sugar-Free) 100 milliGRAM(s) Oral every 6 hours PRN Cough      ASSESSMENT / PLAN:  ----------------------------------------  85 year old male from [facility] with hx of anxiety, bipolar, depression, hypothyroidism, asthma/copd, idiopathic thrombocytopenia who presents c/o dysuria, UA positive and noted with UTI. Also noted with LLQ abd pain and ct abd/pelvis showing evidence of mild acute diverticulitis, also noted with possible prostate abscess requiring surgical drainage.    Prostate abscess - Status post cystoscopy and unroofing of prostate abscess. Continuous bladder irrigation discontinued yesterday. Urology follow up noted, Soto catheter removed. To monitor urine output. For continuation of intravenous antibiotics through 11/4/20 via midline.    Acute diverticulitis - Mild diverticulitis of the proximal sigmoid colon without abscess. Treated with ceftriaxone and metronidazole. Denied any gastrointestinal complaints. Afebrile and without leukocytosis. Blood cultures were without growth.    Urine retention - On tamsulosin. Monitor for urine output after removal of Soto catheter.    Hypothyroidism - On levothyroxine.    Hyponatremia - Repeat laboratory studies ordered to monitor.    Chronic kidney disease III - Renal function stable.    COPD - No dyspnea or hypoxia on examination. Inhaled bronchodilator therapy as needed.    Bipolar disorder - On bupropion, escitalopram, and quetiapine. Psychiatry consultation noted, no further intervention recommended.

## 2020-10-30 NOTE — DISCHARGE NOTE PROVIDER - CARE PROVIDER_API CALL
Timothy Rock J  UROLOGY  [address] [facility], [address]  [place] [address]  Phone: [phone]  Fax: [phone]  Follow Up Time:     Robin iGbbs  [address], [address] [place] [id number]  Phone: [phone]  Fax: [phone]  Follow Up Time:

## 2020-10-30 NOTE — PROGRESS NOTE ADULT - ASSESSMENT
85 [facility] hx of anxiety, bipolar, depression, hypothyroidism, asthma/copd, idiopathic thrombocytopenia who presents c/o dysuria, UA positive and noted with UTI. Also noted with LLQ abd pain and ct abd/pelvis showing evidence of mild acute diverticulitis, also noted with possible prostate abscess.     Acute  diverticulitis of the proximal sigmoid colon    - afebrile  leukocytosis improving  - CT a/p Evidence of mild diverticulitis of the proximal sigmoid colon without evidence of associated abscess.  -f/u B cx   -c/w rocephin and flagyl D#2  -c/w florastor po bid   - ID consulted   - post tx and 4-6 weeks later pt would benefit from colonoscopy outpt     Asymmetric enlargement of the prostate   - noted on ct abd/pelvis with asymmetric enlargement of the prostate gland with a low density within this portion of the prostate. Possible prostate abscess  - prostate US:  Bladder is distended measuring 10.4 x 8.1 x 8.8 cm. Postvoid bladder volume segments are not 0.9 x 8.5 x 8.6 cm. Patient unable to void adequately. Postvoid volume 376 cc.   - f/u PSA leve  -ID consulted  - will consult urology based on prostate US results   - bladder scan and start Flomax    UTI  - concern for possible acute urinary retention   - UA positive  - f/u U cx    - bladder scan q8hrs and straight cath x 2 if >350ml thereafter if pt retains lester to be inserted. Flomax started.   -c/w rocephin   -c/w florastor po bid     Chronic kidney disease III   - baseline cr: 1.5  currently cr 1.28  - holding lasix    - avoid nephrotoxic medications  - c/w monitoring bun/cr closely     COPD  - stable not in an exacerbation  - recently discharge 10/14 s/p copd exacerbation. Pt is [facility] patient.   -pt was on prednisone taper at the facility, currently stable will hold off on any further steroids.   - c/w duoneb q6hrs routine   -c/w albuterol prn     Hypothyroidism   -c/w synthroid po qd     Bipolar disorder / Anxiety   - c/w escitalopram, bupropion, and quetiapine   - pt at baseline is forgetful, unclear hx of dementia aaox2-3   - prior to discharge pt needs psych evaluation [facility]     DVT ppx: c/w heparin sq     Activity level: increase as tolerated  Baseline ambulates independently and sometimes uses a walker   Advanced directive: Full code - as per facility paper work and patient himself states this   Next of kin: Wife and Son Kleber     Dispo: Pt is from [facility], will need PT and psych evaluation to return. Anticipated los likely 2-3 days pending cx results/ sensitivity.     
88M with Hx as above with BPH, UTI and difficult lester placement    - 16Fr Coude catheter placed without difficulty, should remain in place for now   - suggest start flomax 0.4mg daily  - + WBC, UA with minimal WBC  - will review CT with Dr. Rock
88y  Male from [facility] with hx of anxiety, bipolar, depression, hypothyroidism, asthma/copd, idiopathic thrombocytopenia who presents to the ER with c/o urinary burning, discomfort and pain when urinating. He reports he cannot tolerate the pain he feels, he has been holding in his urine due to how uncomfortable he feels. He is having associated abdominal discomfort in the LLQ but has not noted any symptoms with diarrhea/ N/V. He also states he has been feeling more weakness with decreased po intake. Pt is AAOx 2, person, year and hospital, He is unaware of how he came to the ER. In the ER patient is afebrile, leukocytosis to 28k. Started on flagyl and Ceftriaxone.      Acute diverticulitis  r/o Prostate abscess  Urinary retention   s/p Soto  Leukocytosis   PCN allergy       - Blood cultures No growth   - Urine culture contaminated   - covid 19 PCR negative   - CT A/P reporting Acute diverticulitis   - U/S prostate unable to r/o prostate abscess  - d/w Radiology no reason to do transrectal prostate US or MRI, more likely BPH vs prostate abscess  - Urology eval noted, plan for repeat CT scan Monday   - UA not suggestive of UTI  - Has unknown PCN allergy, tolerating Ceftriaxone   - Continue Ceftriaxone  - Continue Flagyl   - Trend Fever  - Trend Leukocytosis      Will Follow  
88y  Male from [facility] with hx of anxiety, bipolar, depression, hypothyroidism, asthma/copd, idiopathic thrombocytopenia who presents to the ER with c/o urinary burning, discomfort and pain when urinating. He reports he cannot tolerate the pain he feels, he has been holding in his urine due to how uncomfortable he feels. He is having associated abdominal discomfort in the LLQ but has not noted any symptoms with diarrhea/ N/V. He also states he has been feeling more weakness with decreased po intake. Pt is AAOx 2, person, year and hospital, He is unaware of how he came to the ER. In the ER patient is afebrile, leukocytosis to 28k. Started on flagyl and Ceftriaxone.      Acute diverticulitis  r/o Prostate abscess  Urinary retention   s/p Soto  Leukocytosis   PCN allergy       - Blood cultures pending  - Urine culture  - Covid 19 PCR negative   - CT A/P reporting Acute diverticulitis   - U/S prostate unable to r/o prostate abscess  - d/w Radiology no reason to do transrectal prostate US or MRI, more likely BPH vs prostate abscess    - Urology eval  - UA not suggestive of UTI  - Has unknown PCN allergy, tolerating Ceftriaxone     - Continue Ceftriaxone  - Continue Flagyl     - Trend Fever  - Trend Leukocytosis      Will Follow    
88y  Male from [facility] with hx of anxiety, bipolar, depression, hypothyroidism, asthma/copd, idiopathic thrombocytopenia who presents to the ER with c/o urinary burning, discomfort and pain when urinating. He reports he cannot tolerate the pain he feels, he has been holding in his urine due to how uncomfortable he feels. He is having associated abdominal discomfort in the LLQ but has not noted any symptoms with diarrhea/ N/V. He also states he has been feeling more weakness with decreased po intake. Pt is AAOx 2, person, year and hospital, He is unaware of how he came to the ER. In the ER patient is afebrile, leukocytosis to 28k. Started on flagyl and Ceftriaxone.    Prostate abscess  Urinary retention   s/p Acute diverticulitis  Leukocytosis   PCN allergy       - Blood cultures No growth   - Urine culture contaminated   - covid 19 PCR negative   - CT A/P reporting Acute diverticulitis   - U/S prostate unable to r/o prostate abscess  - d/w Radiology no reason to do transrectal prostate US or MRI, more likely BPH vs prostate abscess  - CT A/P with contrast reporting enlargement of this part of the prostate gland suggestive of intraprostatic abscess  - Urology eval noted, plan for MRI  - UA not suggestive of UTI  - Has unknown PCN allergy, tolerating Ceftriaxone   - Continue Ceftriaxone  - Continue Flagyl   - Trend Fever  - Trend Leukocytosis      Will Follow  
88y  Male from [facility] with hx of anxiety, bipolar, depression, hypothyroidism, asthma/copd, idiopathic thrombocytopenia who presents to the ER with c/o urinary burning, discomfort and pain when urinating. He reports he cannot tolerate the pain he feels, he has been holding in his urine due to how uncomfortable he feels. He is having associated abdominal discomfort in the LLQ but has not noted any symptoms with diarrhea/ N/V. He also states he has been feeling more weakness with decreased po intake. Pt is AAOx 2, person, year and hospital, He is unaware of how he came to the ER. In the ER patient is afebrile, leukocytosis to 28k. Started on flagyl and Ceftriaxone.      Acute diverticulitis  r/o Prostate abscess  Urinary retention   s/p Soto  Leukocytosis   PCN allergy       - Blood cultures No growth   - Urine culture contaminated   - covid 19 PCR negative   - CT A/P reporting Acute diverticulitis   - U/S prostate unable to r/o prostate abscess  - d/w Radiology no reason to do transrectal prostate US or MRI, more likely BPH vs prostate abscess  - Urology eval noted, plan for repeat CT scan Monday vs MRI  - UA not suggestive of UTI  - Has unknown PCN allergy, tolerating Ceftriaxone   - Continue Ceftriaxone  - Continue Flagyl   - Trend Fever  - Trend Leukocytosis      Will Follow  
88y  Male from [facility] with hx of anxiety, bipolar, depression, hypothyroidism, asthma/copd, idiopathic thrombocytopenia who presents to the ER with c/o urinary burning, discomfort and pain when urinating. He reports he cannot tolerate the pain he feels, he has been holding in his urine due to how uncomfortable he feels. He is having associated abdominal discomfort in the LLQ but has not noted any symptoms with diarrhea/ N/V. He also states he has been feeling more weakness with decreased po intake. Pt is AAOx 2, person, year and hospital, He is unaware of how he came to the ER. In the ER patient is afebrile, leukocytosis to 28k. Started on flagyl and Ceftriaxone.    Prostate abscess  Urinary retention   s/p Acute diverticulitis  Leukocytosis   PCN allergy       - Blood cultures No growth   - Urine culture contaminated   - covid 19 PCR negative   - CT A/P reporting Acute diverticulitis   - U/S prostate unable to r/o prostate abscess  - d/w Radiology no reason to do transrectal prostate US or MRI, more likely BPH vs prostate abscess  - CT A/P with contrast reporting enlargement of this part of the prostate gland suggestive of intraprostatic abscess  - Urology eval noted, plan for MRI  - UA not suggestive of UTI  - Has unknown PCN allergy, tolerating Ceftriaxone   - Continue Ceftriaxone  - Continue Flagyl   - Trend Fever  - Trend Leukocytosis      Will Follow  
88y  Male from [facility] with hx of anxiety, bipolar, depression, hypothyroidism, asthma/copd, idiopathic thrombocytopenia who presents to the ER with c/o urinary burning, discomfort and pain when urinating. He reports he cannot tolerate the pain he feels, he has been holding in his urine due to how uncomfortable he feels. He is having associated abdominal discomfort in the LLQ but has not noted any symptoms with diarrhea/ N/V. He also states he has been feeling more weakness with decreased po intake. Pt is AAOx 2, person, year and hospital, He is unaware of how he came to the ER. In the ER patient is afebrile, leukocytosis to 28k. Started on flagyl and Ceftriaxone.    Prostate abscess  Urinary retention   s/p Acute diverticulitis  Leukocytosis   PCN allergy       - Blood cultures No growth   - Urine culture contaminated   - covid 19 PCR negative   - CT A/P reporting Acute diverticulitis   - U/S prostate unable to r/o prostate abscess  - d/w Radiology no reason to do transrectal prostate US or MRI, more likely BPH vs prostate abscess  - CT A/P with contrast reporting enlargement of this part of the prostate gland suggestive of intraprostatic abscess  - Urology eval noted, s/p drainage of abscess 10/27  - UA not suggestive of UTI  - Has unknown PCN allergy, tolerating Ceftriaxone   - Continue Ceftriaxone  - D/C Flagyl   - Plan for midline  - Will need ceftriaxone till 11/4/20  - Trend Fever  - Trend Leukocytosis      Will Follow  
88y  Male from [facility] with hx of anxiety, bipolar, depression, hypothyroidism, asthma/copd, idiopathic thrombocytopenia who presents to the ER with c/o urinary burning, discomfort and pain when urinating. He reports he cannot tolerate the pain he feels, he has been holding in his urine due to how uncomfortable he feels. He is having associated abdominal discomfort in the LLQ but has not noted any symptoms with diarrhea/ N/V. He also states he has been feeling more weakness with decreased po intake. Pt is AAOx 2, person, year and hospital, He is unaware of how he came to the ER. In the ER patient is afebrile, leukocytosis to 28k. Started on flagyl and Ceftriaxone.    Prostate abscess  Urinary retention   s/p Acute diverticulitis  Leukocytosis   PCN allergy       - Blood cultures No growth   - Urine culture contaminated   - covid 19 PCR negative   - CT A/P reporting Acute diverticulitis   - U/S prostate unable to r/o prostate abscess  - d/w Radiology no reason to do transrectal prostate US or MRI, more likely BPH vs prostate abscess  - CT A/P with contrast reporting enlargement of this part of the prostate gland suggestive of intraprostatic abscess  - Urology eval noted, s/p drainage of abscess 10/27  - UA not suggestive of UTI  - Has unknown PCN allergy, tolerating Ceftriaxone   - Continue Ceftriaxone  - midline in place  - Will need ceftriaxone till 11/4/20  - Monitor weekly labs  - Trend Fever  - Trend Leukocytosis      Will sign off. Please call PRN.   
89 yo male pt with multiple medical issues urinary retention, poss prostatic abscess  - cont lester cath for now  - recommend MRI prostate to further evaluate prostate
87 yo male with urinary retention, poss prostatic abscess  - bladder scanned pt,  488ml in bladder  - recommend lester cath insertion, pt wants to attempt to void.  Voided 25ml, still wants to wait before lester being reinserted  - will see pt again w/in the hour.  If he still hasn't voided adequately, will need lester reinserted  - will review CT with DR. Rock concerning the prostate
87 yo male with prostatic abscess, urinary retention, POD#2 s/p cysto, unroofing of prostatic abscess  - d/c CBI  - cont lester catheter  - cont abx   - may resume VTE prophylaxis
89 yo male with prostatic abscess, s/p cysto, unroofing of prostatic abscess  - cont lester  - CBI held, if remains light pink will d/c CBI  - cont abx  - f/u am labs  - oob to chair

## 2020-10-30 NOTE — DISCHARGE NOTE PROVIDER - CARE PROVIDERS DIRECT ADDRESSES
[email or web address]

## 2020-10-31 ENCOUNTER — TRANSCRIPTION ENCOUNTER (OUTPATIENT)
Age: 85
End: 2020-10-31

## 2020-11-02 ENCOUNTER — TRANSCRIPTION ENCOUNTER (OUTPATIENT)
Age: 85
End: 2020-11-02

## 2020-11-10 ENCOUNTER — TRANSCRIPTION ENCOUNTER (OUTPATIENT)
Age: 85
End: 2020-11-10

## 2020-11-20 ENCOUNTER — TRANSCRIPTION ENCOUNTER (OUTPATIENT)
Age: 85
End: 2020-11-20

## 2020-12-01 ENCOUNTER — TRANSCRIPTION ENCOUNTER (OUTPATIENT)
Age: 85
End: 2020-12-01

## 2020-12-10 ENCOUNTER — TRANSCRIPTION ENCOUNTER (OUTPATIENT)
Age: 85
End: 2020-12-10

## 2021-01-10 NOTE — ED ADULT TRIAGE NOTE - BP NONINVASIVE SYSTOLIC (MM HG)
99
Abuse of Alcohol    WHAT YOU NEED TO KNOW:    Alcohol abuse means you drink more than the recommended daily or weekly limits. You may be drinking alcohol regularly or drinking large amounts in a short period of time (binge drinking). You continue to drink even though it causes legal, work, or relationship problems.    DISCHARGE INSTRUCTIONS:    Call your local emergency number (911 in the ) for any of the following:   •You have sudden chest pain or trouble breathing.      •You want to harm yourself or others.      •You have a seizure or have shaking or trembling.      Call your doctor if:   •You have hallucinations (you see or hear things that are not real).      •You cannot stop vomiting or you vomit blood.      •You need help to stop drinking alcohol.       •You have questions or concerns about your condition or care.      Medicines:   •Vitamin supplements may be given to treat low vitamin levels. Alcohol can make it hard for your body to absorb enough vitamins such as B1. Vitamin supplements may also be given to prevent alcohol related brain damage.       •Take your medicine as directed. Contact your healthcare provider if you think your medicine is not helping or if you have side effects. Tell him or her if you are allergic to any medicine. Keep a list of the medicines, vitamins, and herbs you take. Include the amounts, and when and why you take them. Bring the list or the pill bottles to follow-up visits. Carry your medicine list with you in case of an emergency.      Health problems alcohol abuse can cause:   •Cancer in your liver, pancreas, stomach, colon, kidney, or breast      •Stroke or a heart attack      •Liver, kidney, or lung disease      •Blackouts, memory loss, brain damage, or dementia      •Diabetes, immune system problems, or thiamine (vitamin B1) deficiency      •Problems for you and your baby if you drink while pregnant      Recommended alcohol limits:   •Men 21 to 64 years should limit alcohol to 2 drinks a day. Do not have more than 4 drinks in 1 day or more than 14 in 1 week.      •All women, and men 65 or older should limit alcohol to 1 drink in a day. Do not have more than 3 drinks in 1 day or more than 7 in 1 week. No amount of alcohol is okay during pregnancy.      Manage alcohol use:   •Decrease the amount you drink. This can help prevent health problems such as brain, heart, and liver damage, high blood pressure, diabetes, and cancer. If you cannot stop completely, healthcare providers can help you set goals to decrease the amount you drink.      •Plan weekly alcohol use. You will be less likely to drink more than the recommended limit if you plan ahead.      •Have food when you drink alcohol. Food will prevent alcohol from getting into your system too quickly. Eat before you have your first alcohol drink.      •Time your drinks carefully. Have no more than 1 drink in an hour. Have a liquid such as water, coffee, or a soft drink between alcohol drinks.      •Do not drive if you have had alcohol. Make sure someone who has not been drinking can help you get home.      •Do not drink alcohol if you are taking medicine. Alcohol is dangerous when you combine it with certain medicines, such as acetaminophen or blood pressure medicine. Talk to your healthcare provider about all the medicines you currently take.      Follow up with your healthcare provider as directed: Write down your questions so you remember to ask them during your visits.    For support and more information:   •Alcoholics Anonymous  Web Address: http://www.aa.org      •Substance Abuse and Mental Health Services Administration  [address]  [place] [address]  Web Address: http://www.samhsa.gov           © Copyright [employer] 2021           back to top                          © Copyright [employer] 2021

## 2021-05-17 PROBLEM — F03.90 UNSPECIFIED DEMENTIA WITHOUT BEHAVIORAL DISTURBANCE: Chronic | Status: ACTIVE | Noted: 2020-10-09

## 2021-05-17 PROBLEM — Z87.891 PERSONAL HISTORY OF NICOTINE DEPENDENCE: Chronic | Status: ACTIVE | Noted: 2020-10-09

## 2021-05-17 PROBLEM — J44.9 CHRONIC OBSTRUCTIVE PULMONARY DISEASE, UNSPECIFIED: Chronic | Status: ACTIVE | Noted: 2020-10-09

## 2021-05-17 PROBLEM — F41.9 ANXIETY DISORDER, UNSPECIFIED: Chronic | Status: ACTIVE | Noted: 2020-10-22

## 2021-05-17 PROBLEM — F32.9 MAJOR DEPRESSIVE DISORDER, SINGLE EPISODE, UNSPECIFIED: Chronic | Status: ACTIVE | Noted: 2020-07-03

## 2021-05-17 PROBLEM — F41.9 ANXIETY DISORDER, UNSPECIFIED: Chronic | Status: ACTIVE | Noted: 2020-07-03

## 2021-05-17 PROBLEM — E03.9 HYPOTHYROIDISM, UNSPECIFIED: Chronic | Status: ACTIVE | Noted: 2020-07-03

## 2021-05-17 PROBLEM — D69.3 IMMUNE THROMBOCYTOPENIC PURPURA: Chronic | Status: ACTIVE | Noted: 2020-07-03

## 2021-05-18 ENCOUNTER — OUTPATIENT (OUTPATIENT)
Dept: OUTPATIENT SERVICES | Facility: HOSPITAL | Age: 86
LOS: 1 days | End: 2021-05-18
Payer: MEDICARE

## 2021-05-18 ENCOUNTER — RESULT REVIEW (OUTPATIENT)
Age: 86
End: 2021-05-18

## 2021-05-18 ENCOUNTER — APPOINTMENT (OUTPATIENT)
Dept: MAMMOGRAPHY | Facility: CLINIC | Age: 86
End: 2021-05-18
Payer: MEDICARE

## 2021-05-18 ENCOUNTER — APPOINTMENT (OUTPATIENT)
Dept: ULTRASOUND IMAGING | Facility: CLINIC | Age: 86
End: 2021-05-18
Payer: MEDICARE

## 2021-05-18 DIAGNOSIS — Z00.00 ENCOUNTER FOR GENERAL ADULT MEDICAL EXAMINATION WITHOUT ABNORMAL FINDINGS: ICD-10-CM

## 2021-05-18 DIAGNOSIS — Z90.49 ACQUIRED ABSENCE OF OTHER SPECIFIED PARTS OF DIGESTIVE TRACT: Chronic | ICD-10-CM

## 2021-05-18 DIAGNOSIS — I71.00 DISSECTION OF UNSPECIFIED SITE OF AORTA: Chronic | ICD-10-CM

## 2021-05-18 PROCEDURE — 77066 DX MAMMO INCL CAD BI: CPT | Mod: 26

## 2021-05-18 PROCEDURE — G0279: CPT | Mod: 26

## 2021-05-18 PROCEDURE — 76642 ULTRASOUND BREAST LIMITED: CPT

## 2021-05-18 PROCEDURE — G0279: CPT

## 2021-05-18 PROCEDURE — 76642 ULTRASOUND BREAST LIMITED: CPT | Mod: 26,RT

## 2021-05-18 PROCEDURE — 77066 DX MAMMO INCL CAD BI: CPT

## 2021-06-12 NOTE — ED PROVIDER NOTE - CPE EDP ENMT NORM
Here for medication management and follow-up.    Sleep - fair, trouble staying asleep and then falling back to sleep at that point.  Dr. Arrington treats that.  Depression - rated as a 3 out of 10  Anxiety - rated as 7 out of 10.  Panic attacks - occur about weekly  SI/HI - no    No seeing Kali for therapy, did not like him.      [employer] [place] Date: 17  Query Report Page#: 1  Patient Rx History Report  KONRAD AQUINO  Search Criteria: Last Name 'konrad' and First Name grady' and  =  and Request Period =  to  ' - 1 out of 1 Recipients Selected.  Fill Date Product, Str, Form Qty Days Pt ID Prescriber Written RX# N/R* Pharm **MED+  ---------- -------------------------------- ------ ---- --------- ---------- ---------- ------------ ----- --------- ------  2017 LORAZEPAM 1 MG TABLET 6.00 2 [id number] 2017 [id number] N [id number] 00.0  2017 LORAZEPAM 1 MG TABLET 21.00 7 [id number] 2017 [id number] N [id number] 00.0  2017 HYDROCODON-ACETAMINOPHEN 5-325 20.00 4 [id number] 2017 [id number] N [id number] 25.0  2017 LORAZEPAM 1 MG TABLET 21.00 7 [id number] 2017 [id number] N [id number] 00.0  2017 ZOLPIDEM TARTRATE 5 MG TABLET 15.00 15 [id number] 2017 [id number] R [id number] 00.0  2017 HYDROCODON-ACETAMINOPHEN 5-325 45.00 4 [id number] 2017 [id number] R [id number] 56.25  2017 ZOLPIDEM TARTRATE 5 MG TABLET 15.00 15 [id number] 2017 [id number] R [id number] 00.0  2017 LORAZEPAM 1 MG TABLET 100.00 30 [id number] 2017 [id number] R [id number] 00.0  2017 OXYCODON-ACETAMINOPHEN 7.5-325 60.00 10 [id number] 2017 [id number] R [id number] 67.5  2017 LORAZEPAM 1 MG TABLET 100.00 30 [id number] 2017 [id number] N [id number] 00.0  2017 ZOLPIDEM TARTRATE 5 MG TABLET 15.00 15 [id number] 2017 [id number] N [id number]  00.0  05/10/2017 OXYCODON-ACETAMINOPHEN 7.5-325 60.00 10 [id number] 05/10/2017 [id number] N [id number] 67.5  *N/R N=New R=Refill  +MED Daily  Prescribers for prescriptions listed  ----------------------------------------------------------------------------------------------------------------------------------  [id number] CARLOS MENDENHALL MD; [facility], [address], [place] [address]  [id number] AMIE MUELLER MD; [address], [place] [address]  Pharmacies that dispensed prescriptions listed  ----------------------------------------------------------------------------------------------------------------------------------  [id number] [facility] ; [address] [place] [address], [facility] [place] [address],  [id number] [employer]; : [facility] [id number], [facility],, [facility] [address],  Patients that match search criteria  ----------------------------------------------------------------------------------------------------------------------------------  [id number] DONNA LARA 52;  [facility] [address]  MED Summary  This section displays cumulative MED values by unique recipient. The MED Max value is the maximum occurrence of cumulative MED  sustained for any 3 consecutive days. This value is calculated based on prescriptions dispensed during the date range requested.  -----------------------------------------------------------------------------------------------------------------------------------  [address]; 1952;  [facility] [address]  **Per CDC guidance, the conversion factors and associated daily morphine milligram equivalents for drugs prescribed as part of  medication-assisted treatment for opioid use disorder should not be used to benchmark against dosage thresholds meant for opioids  prescribed for pain.  Report Disclaimers:  The report provided above is based upon the search criteria and the  data provided by the dispensing entities. For more information  about any prescription, please contact the dispenser or the prescriber.  This report contains confidential information, including patient identifiers, and is not a public record. The information on this  report must be treated as protected health information and is to be disclosed to others only as authorized by applicable state  and Federal regulations.   normal...

## 2021-10-06 PROBLEM — I10 ESSENTIAL HYPERTENSION: Status: ACTIVE | Noted: 2020-10-15

## 2022-07-23 NOTE — ED ADULT NURSE NOTE - CHPI ED NUR TIMING2
Patient has what appears to be an insect bite noted to medial left thigh.  Redness noted periwound but also radiates circumferentially on left thigh and knee      Jud Onofre RN  07/23/22 6495 sudden onset

## 2022-08-29 NOTE — ASU PREOP CHECKLIST - WAS PATIENT ON BETA BLOCKER?
Refill Request     CONFIRM preferrred pharmacy with the patient. If Mail Order Rx - Pend for 90 day refill.       Last Seen: Last Seen Department: 2022    Last Seen by PCP: 2022    Last Written: 22 180 tablet 1 refill    Next Appointment: 22  Future Appointments   Date Time Provider Dora Licona   2022 11:30 AM DO NINA [place]on [place] - [facility]       Future appointment scheduled      Requested Prescriptions     Pending Prescriptions Disp Refills    glipiZIDE (GLUCOTROL XL) 10 MG extended release tablet 180 tablet 1     Si daily in AM
Yes

## 2023-06-29 ENCOUNTER — NON-APPOINTMENT (OUTPATIENT)
Age: 88
End: 2023-06-29

## 2023-09-26 ENCOUNTER — OUTPATIENT (OUTPATIENT)
Dept: OUTPATIENT SERVICES | Facility: HOSPITAL | Age: 88
LOS: 1 days | End: 2023-09-26

## 2023-09-26 DIAGNOSIS — D64.9 ANEMIA, UNSPECIFIED: ICD-10-CM

## 2023-09-26 DIAGNOSIS — I71.00 DISSECTION OF UNSPECIFIED SITE OF AORTA: Chronic | ICD-10-CM

## 2023-09-26 DIAGNOSIS — Z90.49 ACQUIRED ABSENCE OF OTHER SPECIFIED PARTS OF DIGESTIVE TRACT: Chronic | ICD-10-CM

## 2023-10-02 ENCOUNTER — APPOINTMENT (OUTPATIENT)
Age: 88
End: 2023-10-02

## 2023-10-24 NOTE — ED ADULT NURSE NOTE - SUICIDE SCREENING DEPRESSION
----- Message from Lonnie Tidwell MD sent at 10/24/2023  7:23 AM CDT -----  jm call-no change     Positive

## 2024-09-20 NOTE — PHYSICAL THERAPY INITIAL EVALUATION ADULT - ORIENTATION, REHAB EVAL
"    Occupational Therapy  Occupational Therapy Orthopedic Evaluation    Patient Name: Shira Castaneda  MRN: [id number]  Today's Date: 9/20/2024  Time Calculation  Start Time: 1330  Stop Time: 1405  Time Calculation (min): 35 min      Time:  Time Calculation  Start Time: 1330  Stop Time: 1405  Time Calculation (min): 35 min  OT Evaluation Time Entry  OT Evaluation (Low) Time Entry: 20    Insurance:  Visit number: 1 of 4  Authorization info: no authorization is needed  Insurance Type: Payor: [employer] / Plan: [employer] HMP / Product Type: *No Product type* /    General:  Reason for visit: L  RF PIP flexion contracture; Trigger finger to R LF   Referred by: Yesenia    Current Problem  1. 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 Patient reports having L  RF stiffness resulting following I & D. Most recent SX included  a L RF tenosynovectomy on 11/1/23 with excision of FDS. Patient reports having triggering to R LF for \"over a year.\"  BG: Insidious   DOI/DOS: DOS=11/1/23 with L RF flexor tenosynovectomy with excision of FDS       Hand Dominance: Right    Current Condition since injury:   same     PAIN  Pain Assessment: 0-10  0-10 (Numeric) Pain Score: 0 - No pain  Pain Type: Chronic pain  Pain Location: Finger (Comment which one)  Pain Orientation: Left  Pain Radiating Towards: RF    Aggravating Factors: Finger/Thumb Flexion and Finger/Thumb extension   Relieving Factors: Rest     Relevant Information (PMH & Previous Tests/Imaging): HX or multiple surgeries to L hand   Previous Interventions/Treatments: Physical Therapy/Occupational Therapy HX of outpatient OT for \"over a year.\"    Prior Level of Function (PLOF)  Exercise/Physical " "Activity: Patient reports being independent with all ADL's prior to initial injury.   Work/School: Patient is employed as a full time .   Current ADL/IADL Status: All ADL's involving L hand are impaired. Patient has more frequent triggering in R LF with repetitive gripping during ADL's.      Patients Living Environment: Reviewed and no concern    Primary Language: English    There are no spiritual/cultural practices/values/needs that are important to know      Pt goals for therapy: \"Make that finger straighter\" RE: L RF    Red Flags: Do you have any of the following? No  Fever/chills, unexplained weight changes, dizziness/fainting, unexplained change in bowel or bladder functions, unexplained malaise or muscle weakness, night pain/sweats, numbness or tingling    Objective:  Evaluation Complexity=Low  Rehab Prognosis=Good     RIGHT HAND AROM (Degrees)   MCP PIP DIP LAZO DPC   IF      0   MF WFL WFL WFL  0   RF     0   SF     0   Thumb        Thumb RABD        Thumb PABD          LEFT HAND AROM (Degrees)   MCP PIP DIP LAZO DPC   IF         MF        RF 0/65 -45/80 0/42 142    SF        Thumb        Thumb RABD        Thumb PABD            Physical Observation: dense scar tissue is noted in L palm. Triggering is felt in R LF with flexion.   Edema: mild throughout L RF.     Sensory: intact   Numbness/Tingling: none        Outcome Measures:  UEFI: 65/80    EDUCATION: home exercise program, plan of care, activity modifications, pain management, and injury pathology       Goals:  Active       OT Goals       Patient is independent with entire HEP including splint use.        Start:  09/20/24    Expected End:  10/20/24            No signs of pain or skin irritation with use of R HS splint.        Start:  09/20/24    Expected End:  10/20/24            Patient is independent with dynasplint use for L RF with no signs of skin irritation or pain.        Start:  09/20/24    Expected End:  10/20/24                Plan of " care was developed with input and agreement by the patient    Treatments:     Orthosis:      15 min  Palmar based MP extension of R LF with IP's free splint fabrication and application for HS use only     Assessment: Patient is a 64 yo female  s/p L RF PIP flexion contracture and triggering of R LF resulting in limited participation in pain-free ADLs and inability to perform at their prior level of function. Pt would benefit from occupational therapy to address the impairments found & listed previously in the objective section in order to return to safe and pain-free ADLs and prior level of function.       Clinical Presentation: Stable and/or uncomplicated characteristics       Plan:      Planned Interventions include: therapeutic exercise, therapeutic activity, self-care home management, manual therapy, therapeutic activities, gait training, neuromuscular coordination, vasopneumatic, dry needling, aquatic therapy, electric stimulation, fluidotherapy, ultrasound, kinesiotaping, orthosis fabrication, wound care  Frequency: 1 x Week  Duration: 4 Weeks  Rehab potential/prognosis: Good       Anisha Scruggs, OT   oriented to person, place, time and situation
